# Patient Record
Sex: MALE | Race: BLACK OR AFRICAN AMERICAN | Employment: UNEMPLOYED | URBAN - METROPOLITAN AREA
[De-identification: names, ages, dates, MRNs, and addresses within clinical notes are randomized per-mention and may not be internally consistent; named-entity substitution may affect disease eponyms.]

---

## 2018-07-05 ENCOUNTER — HOSPITAL ENCOUNTER (OUTPATIENT)
Dept: GENERAL RADIOLOGY | Age: 58
Discharge: HOME | End: 2018-07-07
Payer: MEDICARE

## 2018-07-05 DIAGNOSIS — G89.29 CHRONIC PAIN OF RIGHT ANKLE: ICD-10-CM

## 2018-07-05 DIAGNOSIS — M25.571 CHRONIC PAIN OF RIGHT ANKLE: ICD-10-CM

## 2018-07-05 PROCEDURE — 73630 X-RAY EXAM OF FOOT: CPT

## 2018-07-05 PROCEDURE — 73610 X-RAY EXAM OF ANKLE: CPT

## 2022-12-15 ENCOUNTER — APPOINTMENT (OUTPATIENT)
Dept: GENERAL RADIOLOGY | Age: 62
DRG: 420 | End: 2022-12-15
Payer: MEDICARE

## 2022-12-15 ENCOUNTER — HOSPITAL ENCOUNTER (INPATIENT)
Age: 62
LOS: 5 days | Discharge: HOME HEALTH | DRG: 420 | End: 2022-12-20
Attending: EMERGENCY MEDICINE | Admitting: INTERNAL MEDICINE
Payer: MEDICARE

## 2022-12-15 DIAGNOSIS — E11.11 DIABETIC KETOACIDOSIS WITH COMA ASSOCIATED WITH TYPE 2 DIABETES MELLITUS (HCC): Primary | ICD-10-CM

## 2022-12-15 DIAGNOSIS — E10.10 DKA, TYPE 1, NOT AT GOAL (HCC): ICD-10-CM

## 2022-12-15 LAB
ALBUMIN SERPL-MCNC: 3.7 G/DL (ref 3.5–4.6)
ALP BLD-CCNC: 92 U/L (ref 35–104)
ALT SERPL-CCNC: 11 U/L (ref 0–41)
ANION GAP SERPL CALCULATED.3IONS-SCNC: 41 MEQ/L (ref 9–15)
AST SERPL-CCNC: 6 U/L (ref 0–40)
BACTERIA: NEGATIVE /HPF
BANDED NEUTROPHILS RELATIVE PERCENT: 1 %
BASE EXCESS VENOUS: -23 (ref -3–3)
BASOPHILS ABSOLUTE: 0 K/UL (ref 0–0.2)
BASOPHILS RELATIVE PERCENT: 0.7 %
BETA-HYDROXYBUTYRATE: 133.7 MG/DL (ref 0.2–2.8)
BILIRUB SERPL-MCNC: 0.5 MG/DL (ref 0.2–0.7)
BILIRUBIN URINE: NEGATIVE
BLOOD, URINE: ABNORMAL
BUN BLDV-MCNC: 49 MG/DL (ref 8–23)
CALCIUM IONIZED: 0.97 MMOL/L (ref 1.12–1.32)
CALCIUM SERPL-MCNC: 8.8 MG/DL (ref 8.5–9.9)
CHLORIDE BLD-SCNC: 84 MEQ/L (ref 95–107)
CHP ED QC CHECK: NORMAL
CLARITY: CLEAR
CO2: 8 MEQ/L (ref 20–31)
COLOR: YELLOW
CREAT SERPL-MCNC: 2.87 MG/DL (ref 0.7–1.2)
CREATININE URINE: 26.9 MG/DL
EOSINOPHILS ABSOLUTE: 0 K/UL (ref 0–0.7)
EOSINOPHILS RELATIVE PERCENT: 0 %
EPITHELIAL CELLS, UA: ABNORMAL /HPF (ref 0–5)
GFR SERPL CREATININE-BSD FRML MDRD: 24 ML/MIN/{1.73_M2}
GFR SERPL CREATININE-BSD FRML MDRD: 25 ML/MIN/{1.73_M2}
GLOBULIN: 3.5 G/DL (ref 2.3–3.5)
GLUCOSE BLD-MCNC: 1261 MG/DL (ref 70–99)
GLUCOSE BLD-MCNC: >600 MG/DL (ref 70–99)
GLUCOSE BLD-MCNC: >700 MG/DL (ref 70–99)
GLUCOSE URINE: >=1000 MG/DL
HCO3 VENOUS: 7.3 MMOL/L (ref 23–29)
HCT VFR BLD CALC: 48.1 % (ref 42–52)
HEMOGLOBIN: 14.4 G/DL (ref 14–18)
HEMOGLOBIN: 17 GM/DL (ref 13.5–17.5)
HYALINE CASTS: ABNORMAL /HPF (ref 0–5)
INFLUENZA A BY PCR: NEGATIVE
INFLUENZA B BY PCR: NEGATIVE
KETONES, URINE: >=80 MG/DL
LACTATE: 8.96 MMOL/L (ref 0.4–2)
LEUKOCYTE ESTERASE, URINE: NEGATIVE
LYMPHOCYTES ABSOLUTE: 0.5 K/UL (ref 1–4.8)
LYMPHOCYTES RELATIVE PERCENT: 3 %
MAGNESIUM: 3.2 MG/DL (ref 1.7–2.4)
MCH RBC QN AUTO: 25.6 PG (ref 27–31.3)
MCHC RBC AUTO-ENTMCNC: 29.9 % (ref 33–37)
MCV RBC AUTO: 85.5 FL (ref 79–92.2)
MONOCYTES ABSOLUTE: 0.5 K/UL (ref 0.2–0.8)
MONOCYTES RELATIVE PERCENT: 2.9 %
MYELOCYTE PERCENT: 1 %
NEUTROPHILS ABSOLUTE: 15.6 K/UL (ref 1.4–6.5)
NEUTROPHILS RELATIVE PERCENT: 92 %
NITRITE, URINE: NEGATIVE
O2 SAT, VEN: 83 %
PCO2, VEN: 26.2 MM HG (ref 40–50)
PDW BLD-RTO: 13 % (ref 11.5–14.5)
PERFORMED ON: ABNORMAL
PH UA: 5 (ref 5–9)
PH VENOUS: 7.06 (ref 7.32–7.42)
PLATELET # BLD: 393 K/UL (ref 130–400)
PLATELET SLIDE REVIEW: NORMAL
PO2, VEN: 65 MM HG
POC CHLORIDE: 106 MEQ/L (ref 99–110)
POC CREATININE: 2.8 MG/DL (ref 0.8–1.3)
POC HEMATOCRIT: 50 % (ref 41–53)
POC POTASSIUM: 5.2 MEQ/L (ref 3.5–5.1)
POC SAMPLE TYPE: ABNORMAL
POC SODIUM: 128 MEQ/L (ref 136–145)
POTASSIUM SERPL-SCNC: 5.4 MEQ/L (ref 3.4–4.9)
PROTEIN UA: 30 MG/DL
RBC # BLD: 5.62 M/UL (ref 4.7–6.1)
RBC # BLD: NORMAL 10*6/UL
RBC UA: ABNORMAL /HPF (ref 0–5)
REASON FOR REJECTION: NORMAL
REJECTED TEST: NORMAL
SARS-COV-2, NAAT: NOT DETECTED
SODIUM BLD-SCNC: 133 MEQ/L (ref 135–144)
SPECIFIC GRAVITY UA: 1.03 (ref 1–1.03)
TCO2 CALC VENOUS: 8 MMOL/L
TOTAL PROTEIN: 7.2 G/DL (ref 6.3–8)
UROBILINOGEN, URINE: 0.2 E.U./DL
WBC # BLD: 16.6 K/UL (ref 4.8–10.8)
WBC UA: ABNORMAL /HPF (ref 0–5)

## 2022-12-15 PROCEDURE — 82565 ASSAY OF CREATININE: CPT

## 2022-12-15 PROCEDURE — 82570 ASSAY OF URINE CREATININE: CPT

## 2022-12-15 PROCEDURE — 82010 KETONE BODYS QUAN: CPT

## 2022-12-15 PROCEDURE — 84295 ASSAY OF SERUM SODIUM: CPT

## 2022-12-15 PROCEDURE — 82435 ASSAY OF BLOOD CHLORIDE: CPT

## 2022-12-15 PROCEDURE — 96375 TX/PRO/DX INJ NEW DRUG ADDON: CPT

## 2022-12-15 PROCEDURE — 85025 COMPLETE CBC W/AUTO DIFF WBC: CPT

## 2022-12-15 PROCEDURE — 96366 THER/PROPH/DIAG IV INF ADDON: CPT

## 2022-12-15 PROCEDURE — 87086 URINE CULTURE/COLONY COUNT: CPT

## 2022-12-15 PROCEDURE — 82330 ASSAY OF CALCIUM: CPT

## 2022-12-15 PROCEDURE — 2580000003 HC RX 258: Performed by: INTERNAL MEDICINE

## 2022-12-15 PROCEDURE — 6370000000 HC RX 637 (ALT 250 FOR IP): Performed by: EMERGENCY MEDICINE

## 2022-12-15 PROCEDURE — 71045 X-RAY EXAM CHEST 1 VIEW: CPT

## 2022-12-15 PROCEDURE — 84100 ASSAY OF PHOSPHORUS: CPT

## 2022-12-15 PROCEDURE — 2580000003 HC RX 258: Performed by: EMERGENCY MEDICINE

## 2022-12-15 PROCEDURE — 2000000000 HC ICU R&B

## 2022-12-15 PROCEDURE — 87502 INFLUENZA DNA AMP PROBE: CPT

## 2022-12-15 PROCEDURE — 84540 ASSAY OF URINE/UREA-N: CPT

## 2022-12-15 PROCEDURE — 36415 COLL VENOUS BLD VENIPUNCTURE: CPT

## 2022-12-15 PROCEDURE — 82803 BLOOD GASES ANY COMBINATION: CPT

## 2022-12-15 PROCEDURE — 6360000002 HC RX W HCPCS: Performed by: EMERGENCY MEDICINE

## 2022-12-15 PROCEDURE — 93005 ELECTROCARDIOGRAM TRACING: CPT | Performed by: EMERGENCY MEDICINE

## 2022-12-15 PROCEDURE — 96361 HYDRATE IV INFUSION ADD-ON: CPT

## 2022-12-15 PROCEDURE — 82948 REAGENT STRIP/BLOOD GLUCOSE: CPT

## 2022-12-15 PROCEDURE — 80053 COMPREHEN METABOLIC PANEL: CPT

## 2022-12-15 PROCEDURE — 83605 ASSAY OF LACTIC ACID: CPT

## 2022-12-15 PROCEDURE — 99285 EMERGENCY DEPT VISIT HI MDM: CPT

## 2022-12-15 PROCEDURE — 96365 THER/PROPH/DIAG IV INF INIT: CPT

## 2022-12-15 PROCEDURE — 6370000000 HC RX 637 (ALT 250 FOR IP): Performed by: INTERNAL MEDICINE

## 2022-12-15 PROCEDURE — 85014 HEMATOCRIT: CPT

## 2022-12-15 PROCEDURE — 84132 ASSAY OF SERUM POTASSIUM: CPT

## 2022-12-15 PROCEDURE — 81001 URINALYSIS AUTO W/SCOPE: CPT

## 2022-12-15 PROCEDURE — 36600 WITHDRAWAL OF ARTERIAL BLOOD: CPT

## 2022-12-15 PROCEDURE — 6360000002 HC RX W HCPCS: Performed by: INTERNAL MEDICINE

## 2022-12-15 PROCEDURE — 83735 ASSAY OF MAGNESIUM: CPT

## 2022-12-15 RX ORDER — MAGNESIUM SULFATE 1 G/100ML
1000 INJECTION INTRAVENOUS PRN
Status: DISCONTINUED | OUTPATIENT
Start: 2022-12-15 | End: 2022-12-20 | Stop reason: HOSPADM

## 2022-12-15 RX ORDER — ACETAMINOPHEN 650 MG/1
650 SUPPOSITORY RECTAL EVERY 6 HOURS PRN
Status: DISCONTINUED | OUTPATIENT
Start: 2022-12-15 | End: 2022-12-20 | Stop reason: HOSPADM

## 2022-12-15 RX ORDER — POTASSIUM CHLORIDE 7.45 MG/ML
10 INJECTION INTRAVENOUS PRN
Status: DISCONTINUED | OUTPATIENT
Start: 2022-12-15 | End: 2022-12-20 | Stop reason: HOSPADM

## 2022-12-15 RX ORDER — 0.9 % SODIUM CHLORIDE 0.9 %
2000 INTRAVENOUS SOLUTION INTRAVENOUS ONCE
Status: COMPLETED | OUTPATIENT
Start: 2022-12-15 | End: 2022-12-15

## 2022-12-15 RX ORDER — ONDANSETRON 4 MG/1
4 TABLET, ORALLY DISINTEGRATING ORAL EVERY 8 HOURS PRN
Status: DISCONTINUED | OUTPATIENT
Start: 2022-12-15 | End: 2022-12-20 | Stop reason: HOSPADM

## 2022-12-15 RX ORDER — SODIUM CHLORIDE 450 MG/100ML
INJECTION, SOLUTION INTRAVENOUS CONTINUOUS
Status: DISCONTINUED | OUTPATIENT
Start: 2022-12-15 | End: 2022-12-17

## 2022-12-15 RX ORDER — ENOXAPARIN SODIUM 100 MG/ML
40 INJECTION SUBCUTANEOUS DAILY
Status: DISCONTINUED | OUTPATIENT
Start: 2022-12-16 | End: 2022-12-15

## 2022-12-15 RX ORDER — ONDANSETRON 2 MG/ML
4 INJECTION INTRAMUSCULAR; INTRAVENOUS ONCE
Status: COMPLETED | OUTPATIENT
Start: 2022-12-15 | End: 2022-12-15

## 2022-12-15 RX ORDER — ACETAMINOPHEN 325 MG/1
650 TABLET ORAL EVERY 6 HOURS PRN
Status: DISCONTINUED | OUTPATIENT
Start: 2022-12-15 | End: 2022-12-20 | Stop reason: HOSPADM

## 2022-12-15 RX ORDER — ENOXAPARIN SODIUM 100 MG/ML
40 INJECTION SUBCUTANEOUS DAILY
Status: DISCONTINUED | OUTPATIENT
Start: 2022-12-16 | End: 2022-12-20 | Stop reason: HOSPADM

## 2022-12-15 RX ORDER — DEXTROSE AND SODIUM CHLORIDE 5; .45 G/100ML; G/100ML
INJECTION, SOLUTION INTRAVENOUS CONTINUOUS PRN
Status: DISCONTINUED | OUTPATIENT
Start: 2022-12-15 | End: 2022-12-17

## 2022-12-15 RX ORDER — ONDANSETRON 2 MG/ML
4 INJECTION INTRAMUSCULAR; INTRAVENOUS EVERY 6 HOURS PRN
Status: DISCONTINUED | OUTPATIENT
Start: 2022-12-15 | End: 2022-12-20 | Stop reason: HOSPADM

## 2022-12-15 RX ADMIN — SODIUM CHLORIDE 0.1 UNITS/KG/HR: 9 INJECTION, SOLUTION INTRAVENOUS at 23:56

## 2022-12-15 RX ADMIN — ONDANSETRON 4 MG: 2 INJECTION INTRAMUSCULAR; INTRAVENOUS at 23:30

## 2022-12-15 RX ADMIN — SODIUM CHLORIDE 8 UNITS/HR: 9 INJECTION, SOLUTION INTRAVENOUS at 18:55

## 2022-12-15 RX ADMIN — SODIUM CHLORIDE 2000 ML: 9 INJECTION, SOLUTION INTRAVENOUS at 17:48

## 2022-12-15 RX ADMIN — SODIUM CHLORIDE 2000 ML: 9 INJECTION, SOLUTION INTRAVENOUS at 21:02

## 2022-12-15 RX ADMIN — INSULIN HUMAN 10 UNITS: 100 INJECTION, SOLUTION PARENTERAL at 20:52

## 2022-12-15 RX ADMIN — ONDANSETRON 4 MG: 2 INJECTION INTRAMUSCULAR; INTRAVENOUS at 18:34

## 2022-12-15 ASSESSMENT — ENCOUNTER SYMPTOMS
VOMITING: 1
NAUSEA: 1

## 2022-12-15 ASSESSMENT — PAIN - FUNCTIONAL ASSESSMENT: PAIN_FUNCTIONAL_ASSESSMENT: NONE - DENIES PAIN

## 2022-12-15 NOTE — ED PROVIDER NOTES
[address] [facility] ED  EMERGENCY DEPARTMENT ENCOUNTER      Pt Name: Bette Vallejo  MRN: [id number]  [place] 1960  Date of evaluation: 12/15/2022  Provider: Liza Zimmerman MD    CHIEF COMPLAINT       Chief Complaint   Patient presents with    Illness     Unwell x3days    Emesis    Fatigue     HISTORY OF PRESENT ILLNESS   (Location/Symptom, Timing/Onset, Context/Setting, Quality, Duration, Modifying Factors, Severity)  Note limiting factors. 80-year-old male presenting with general illness. Has felt unwell for about 3 days. Notes multiple episodes of nausea and vomiting. Has not taken anything for relief. No specific pain. On arrival squad finds his blood sugar to be high. No history of diabetes. Alert and oriented but slow to respond. Nursing Notes were reviewed. REVIEW OF SYSTEMS    (2-9 systems for level 4, 10 or more for level 5)     Review of Systems   Constitutional:  Positive for fatigue. Gastrointestinal:  Positive for nausea and vomiting. Neurological:  Positive for weakness. Except as noted above the remainder of the review of systems was reviewed and negative. PAST MEDICAL HISTORY   No past medical history on file. SURGICAL HISTORY     No past surgical history on file. CURRENT MEDICATIONS       Previous Medications    No medications on file       ALLERGIES     Patient has no known allergies. FAMILY HISTORY     No family history on file. SOCIAL HISTORY       Social History     Socioeconomic History    Marital status: Single       SCREENINGS               PHYSICAL EXAM    (up to 7 for level 4, 8 or more for level 5)     ED Triage Vitals [12/15/22 1656]   BP Temp Temp Source Heart Rate Resp SpO2 Height Weight   116/85 -- Oral (!) 109 21 99 % -- --       Physical Exam  Vitals and nursing note reviewed. Constitutional:       General: He is not in acute distress. Appearance: Normal appearance. He is well-developed. He is not ill-appearing. HENT:      Head: Normocephalic and atraumatic. Mouth/Throat:      Mouth: Mucous membranes are dry. Pharynx: Oropharynx is clear. Eyes:      Extraocular Movements: Extraocular movements intact. Conjunctiva/sclera: Conjunctivae normal.   Cardiovascular:      Rate and Rhythm: Regular rhythm. Tachycardia present. Pulmonary:      Effort: Pulmonary effort is normal.      Breath sounds: Normal breath sounds. Abdominal:      General: Bowel sounds are normal.      Palpations: Abdomen is soft. Tenderness: There is no abdominal tenderness. Musculoskeletal:         General: No deformity. Normal range of motion. Cervical back: Normal range of motion and neck supple. Skin:     General: Skin is warm and dry. Capillary Refill: Capillary refill takes less than 2 seconds. Neurological:      General: No focal deficit present. Mental Status: He is alert and oriented to person, place, and time. Mental status is at baseline. Cranial Nerves: No cranial nerve deficit. Psychiatric:         Thought Content:  Thought content normal.       DIAGNOSTIC RESULTS     EKG: All EKG's are interpreted by the Emergency Department Physician who either signs or Co-signs this chart in the absence of a cardiologist.    Sinus tachy, rate 107, normal intervals, no ST elevation/ depression    RADIOLOGY:   Non-plain film images such as CT, Ultrasound and MRI are read by the radiologist. Plain radiographic images are visualized and preliminarily interpreted by the emergency physician with the below findings:    Interpretation per the Radiologist below, if available at the time of this note:    No orders to display       LABS:  Labs Reviewed   CBC WITH AUTO DIFFERENTIAL - Abnormal; Notable for the following components:       Result Value    WBC 16.6 (*)     MCH 25.6 (*)     MCHC 29.9 (*)     All other components within normal limits   BETA-HYDROXYBUTYRATE - Abnormal; Notable for the following components: Beta-Hydroxybutyrate 133.7 (*)     All other components within normal limits   POCT GLUCOSE - Abnormal; Notable for the following components:    POC Glucose >600 (*)     All other components within normal limits   POCT VENOUS - Abnormal; Notable for the following components:    POC Sodium 128 (*)     POC Potassium 5.2 (*)     POC Glucose >700 (*)     POC Creatinine 2.8 (*)     Est, Glom Filt Rate 25 (*)     Calcium, Ionized 0.97 (*)     pH, Tex 7.056 (*)     pCO2, Tex 26.2 (*)     HCO3, Venous 7.3 (*)     Base Excess, Tex -23 (*)     Lactate 8.96 (*)     All other components within normal limits   POCT GLUCOSE - Abnormal; Notable for the following components:    POC Glucose >600 (*)     All other components within normal limits   POCT GLUCOSE - Normal   COVID-19, RAPID   RAPID INFLUENZA A/B ANTIGENS   SPECIMEN REJECTION   COMPREHENSIVE METABOLIC PANEL   MAGNESIUM   POCT EPOC BLOOD GAS, LACTIC ACID, ICA       All other labs were within normal range or not returned as of this dictation. EMERGENCY DEPARTMENT COURSE and DIFFERENTIAL DIAGNOSIS/MDM:   Vitals:    Vitals:    12/15/22 1656 12/15/22 1747   BP: 116/85    Pulse: (!) 109    Resp: 21    Temp:  (!) 92.5 °F (33.6 °C)   TempSrc: Oral Rectal   SpO2: 99%        MDM  Is a fluid rehydration along with being placed on insulin drip. He required ICU. He is already more awake and alert. Hypothermia was a complication and he is put on a Gerri hugger along with warm fluids    Procedures    CRITICAL CARE TIME   Total Critical Care time was 42 minutes, excluding separately reportable procedures. There was a high probability of clinically significant/life threatening deterioration in the patient's condition which required my urgent intervention. FINAL IMPRESSION      1.  Diabetic ketoacidosis with coma associated with type 2 diabetes mellitus [facility])          DISPOSITION/PLAN   DISPOSITION Decision To Admit 12/15/2022 06:21:00 PM      (Please note that portions of this note were completed with a voice recognition program.  Efforts were made to edit the dictations but occasionally words are mis-transcribed.)    Bryce Garrett MD (electronically signed)  Attending Emergency Physician       Bryce Garrett MD  12/15/22 2028

## 2022-12-15 NOTE — ED NOTES
Pt arrived to ED via Ems with c/o of AMS. Pt states he has been ill for a few days. Pt poor historian. Pt is a&ox4. Pt is lethargic. Pt denies chest pain, abd pain. Pt is vomiting during assessment. Pt is cool to touch. Unable to obtain oral temp. Rectal temp 92.5. Pt placed on cardiac monitor. Pt given warm blankets and bear hugger. Attending notified.       Amarilis Gastelum RN  12/15/22 1938

## 2022-12-16 ENCOUNTER — APPOINTMENT (OUTPATIENT)
Dept: INTERVENTIONAL RADIOLOGY/VASCULAR | Age: 62
DRG: 420 | End: 2022-12-16
Payer: MEDICARE

## 2022-12-16 ENCOUNTER — APPOINTMENT (OUTPATIENT)
Dept: ULTRASOUND IMAGING | Age: 62
DRG: 420 | End: 2022-12-16
Payer: MEDICARE

## 2022-12-16 PROBLEM — E11.11 DIABETIC KETOACIDOSIS WITH COMA ASSOCIATED WITH TYPE 2 DIABETES MELLITUS (HCC): Status: ACTIVE | Noted: 2022-12-16

## 2022-12-16 PROBLEM — K92.0 COFFEE GROUND EMESIS: Status: ACTIVE | Noted: 2022-12-16

## 2022-12-16 LAB
ANION GAP SERPL CALCULATED.3IONS-SCNC: 11 MEQ/L (ref 9–15)
ANION GAP SERPL CALCULATED.3IONS-SCNC: 19 MEQ/L (ref 9–15)
ANION GAP SERPL CALCULATED.3IONS-SCNC: 20 MEQ/L (ref 9–15)
ANION GAP SERPL CALCULATED.3IONS-SCNC: 25 MEQ/L (ref 9–15)
ANION GAP SERPL CALCULATED.3IONS-SCNC: 30 MEQ/L (ref 9–15)
ANION GAP SERPL CALCULATED.3IONS-SCNC: 31 MEQ/L (ref 9–15)
BASOPHILS ABSOLUTE: 0 K/UL (ref 0–0.2)
BASOPHILS RELATIVE PERCENT: 0.2 %
BUN BLDV-MCNC: 24 MG/DL (ref 8–23)
BUN BLDV-MCNC: 31 MG/DL (ref 8–23)
BUN BLDV-MCNC: 34 MG/DL (ref 8–23)
BUN BLDV-MCNC: 42 MG/DL (ref 8–23)
BUN BLDV-MCNC: 46 MG/DL (ref 8–23)
BUN BLDV-MCNC: 47 MG/DL (ref 8–23)
CALCIUM SERPL-MCNC: 8.1 MG/DL (ref 8.5–9.9)
CALCIUM SERPL-MCNC: 8.2 MG/DL (ref 8.5–9.9)
CALCIUM SERPL-MCNC: 8.4 MG/DL (ref 8.5–9.9)
CALCIUM SERPL-MCNC: 8.6 MG/DL (ref 8.5–9.9)
CALCIUM SERPL-MCNC: 8.7 MG/DL (ref 8.5–9.9)
CALCIUM SERPL-MCNC: 8.9 MG/DL (ref 8.5–9.9)
CHLORIDE BLD-SCNC: 102 MEQ/L (ref 95–107)
CHLORIDE BLD-SCNC: 109 MEQ/L (ref 95–107)
CHLORIDE BLD-SCNC: 111 MEQ/L (ref 95–107)
CHLORIDE BLD-SCNC: 111 MEQ/L (ref 95–107)
CHLORIDE BLD-SCNC: 114 MEQ/L (ref 95–107)
CHLORIDE BLD-SCNC: 98 MEQ/L (ref 95–107)
CO2: 15 MEQ/L (ref 20–31)
CO2: 17 MEQ/L (ref 20–31)
CO2: 19 MEQ/L (ref 20–31)
CO2: 22 MEQ/L (ref 20–31)
CO2: 9 MEQ/L (ref 20–31)
CO2: 9 MEQ/L (ref 20–31)
CREAT SERPL-MCNC: 1.33 MG/DL (ref 0.7–1.2)
CREAT SERPL-MCNC: 1.64 MG/DL (ref 0.7–1.2)
CREAT SERPL-MCNC: 1.66 MG/DL (ref 0.7–1.2)
CREAT SERPL-MCNC: 2.25 MG/DL (ref 0.7–1.2)
CREAT SERPL-MCNC: 2.43 MG/DL (ref 0.7–1.2)
CREAT SERPL-MCNC: 2.56 MG/DL (ref 0.7–1.2)
EKG ATRIAL RATE: 107 BPM
EKG P AXIS: 87 DEGREES
EKG P-R INTERVAL: 152 MS
EKG Q-T INTERVAL: 368 MS
EKG QRS DURATION: 92 MS
EKG QTC CALCULATION (BAZETT): 491 MS
EKG R AXIS: 80 DEGREES
EKG T AXIS: 77 DEGREES
EKG VENTRICULAR RATE: 107 BPM
EOSINOPHILS ABSOLUTE: 0 K/UL (ref 0–0.7)
EOSINOPHILS RELATIVE PERCENT: 0 %
GFR SERPL CREATININE-BSD FRML MDRD: 27.5 ML/MIN/{1.73_M2}
GFR SERPL CREATININE-BSD FRML MDRD: 29.3 ML/MIN/{1.73_M2}
GFR SERPL CREATININE-BSD FRML MDRD: 32.1 ML/MIN/{1.73_M2}
GFR SERPL CREATININE-BSD FRML MDRD: 46.3 ML/MIN/{1.73_M2}
GFR SERPL CREATININE-BSD FRML MDRD: 47 ML/MIN/{1.73_M2}
GFR SERPL CREATININE-BSD FRML MDRD: >60 ML/MIN/{1.73_M2}
GLUCOSE BLD-MCNC: 160 MG/DL (ref 70–99)
GLUCOSE BLD-MCNC: 237 MG/DL (ref 70–99)
GLUCOSE BLD-MCNC: 242 MG/DL (ref 70–99)
GLUCOSE BLD-MCNC: 244 MG/DL (ref 70–99)
GLUCOSE BLD-MCNC: 258 MG/DL (ref 70–99)
GLUCOSE BLD-MCNC: 261 MG/DL (ref 70–99)
GLUCOSE BLD-MCNC: 273 MG/DL (ref 70–99)
GLUCOSE BLD-MCNC: 281 MG/DL (ref 70–99)
GLUCOSE BLD-MCNC: 301 MG/DL (ref 70–99)
GLUCOSE BLD-MCNC: 306 MG/DL (ref 70–99)
GLUCOSE BLD-MCNC: 327 MG/DL (ref 70–99)
GLUCOSE BLD-MCNC: 390 MG/DL (ref 70–99)
GLUCOSE BLD-MCNC: 440 MG/DL (ref 70–99)
GLUCOSE BLD-MCNC: 452 MG/DL (ref 70–99)
GLUCOSE BLD-MCNC: 460 MG/DL (ref 70–99)
GLUCOSE BLD-MCNC: 557 MG/DL (ref 70–99)
GLUCOSE BLD-MCNC: 576 MG/DL (ref 70–99)
GLUCOSE BLD-MCNC: 667 MG/DL (ref 70–99)
GLUCOSE BLD-MCNC: 773 MG/DL (ref 70–99)
GLUCOSE BLD-MCNC: 877 MG/DL (ref 70–99)
HCT VFR BLD CALC: 38.1 % (ref 42–52)
HCT VFR BLD CALC: 46.2 % (ref 42–52)
HCT VFR BLD CALC: 47.6 % (ref 42–52)
HEMOGLOBIN: 12.7 G/DL (ref 14–18)
HEMOGLOBIN: 15.2 G/DL (ref 14–18)
HEMOGLOBIN: 15.4 G/DL (ref 14–18)
LYMPHOCYTES ABSOLUTE: 0.8 K/UL (ref 1–4.8)
LYMPHOCYTES RELATIVE PERCENT: 5.8 %
MAGNESIUM: 2.7 MG/DL (ref 1.7–2.4)
MAGNESIUM: 2.7 MG/DL (ref 1.7–2.4)
MAGNESIUM: 2.9 MG/DL (ref 1.7–2.4)
MAGNESIUM: 3.1 MG/DL (ref 1.7–2.4)
MAGNESIUM: 3.1 MG/DL (ref 1.7–2.4)
MCH RBC QN AUTO: 25.6 PG (ref 27–31.3)
MCHC RBC AUTO-ENTMCNC: 33 % (ref 33–37)
MCV RBC AUTO: 77.6 FL (ref 79–92.2)
MONOCYTES ABSOLUTE: 1.3 K/UL (ref 0.2–0.8)
MONOCYTES RELATIVE PERCENT: 9 %
NEUTROPHILS ABSOLUTE: 12.2 K/UL (ref 1.4–6.5)
NEUTROPHILS RELATIVE PERCENT: 85 %
PDW BLD-RTO: 12.1 % (ref 11.5–14.5)
PERFORMED ON: ABNORMAL
PHOSPHORUS: 2.9 MG/DL (ref 2.3–4.8)
PHOSPHORUS: 3.2 MG/DL (ref 2.3–4.8)
PHOSPHORUS: 3.4 MG/DL (ref 2.3–4.8)
PHOSPHORUS: 3.7 MG/DL (ref 2.3–4.8)
PHOSPHORUS: 4.5 MG/DL (ref 2.3–4.8)
PLATELET # BLD: 370 K/UL (ref 130–400)
POTASSIUM SERPL-SCNC: 4.3 MEQ/L (ref 3.4–4.9)
POTASSIUM SERPL-SCNC: 4.6 MEQ/L (ref 3.4–4.9)
POTASSIUM SERPL-SCNC: 4.6 MEQ/L (ref 3.4–4.9)
POTASSIUM SERPL-SCNC: 4.8 MEQ/L (ref 3.4–4.9)
POTASSIUM SERPL-SCNC: 4.9 MEQ/L (ref 3.4–4.9)
POTASSIUM SERPL-SCNC: 5.3 MEQ/L (ref 3.4–4.9)
PROCALCITONIN: 0.92 NG/ML (ref 0–0.15)
RBC # BLD: 5.95 M/UL (ref 4.7–6.1)
REASON FOR REJECTION: NORMAL
REJECTED TEST: NORMAL
SODIUM BLD-SCNC: 138 MEQ/L (ref 135–144)
SODIUM BLD-SCNC: 141 MEQ/L (ref 135–144)
SODIUM BLD-SCNC: 147 MEQ/L (ref 135–144)
SODIUM BLD-SCNC: 147 MEQ/L (ref 135–144)
SODIUM BLD-SCNC: 149 MEQ/L (ref 135–144)
SODIUM BLD-SCNC: 150 MEQ/L (ref 135–144)
UREA NITROGEN, UR: 261 MG/DL
WBC # BLD: 14.4 K/UL (ref 4.8–10.8)

## 2022-12-16 PROCEDURE — 2500000003 HC RX 250 WO HCPCS: Performed by: NURSE PRACTITIONER

## 2022-12-16 PROCEDURE — 6360000002 HC RX W HCPCS: Performed by: NURSE PRACTITIONER

## 2022-12-16 PROCEDURE — A4216 STERILE WATER/SALINE, 10 ML: HCPCS | Performed by: INTERNAL MEDICINE

## 2022-12-16 PROCEDURE — 83735 ASSAY OF MAGNESIUM: CPT

## 2022-12-16 PROCEDURE — 2580000003 HC RX 258: Performed by: NURSE PRACTITIONER

## 2022-12-16 PROCEDURE — 6360000002 HC RX W HCPCS: Performed by: INTERNAL MEDICINE

## 2022-12-16 PROCEDURE — G0108 DIAB MANAGE TRN  PER INDIV: HCPCS

## 2022-12-16 PROCEDURE — 2580000003 HC RX 258: Performed by: INTERNAL MEDICINE

## 2022-12-16 PROCEDURE — 76775 US EXAM ABDO BACK WALL LIM: CPT

## 2022-12-16 PROCEDURE — 85014 HEMATOCRIT: CPT

## 2022-12-16 PROCEDURE — C9113 INJ PANTOPRAZOLE SODIUM, VIA: HCPCS | Performed by: INTERNAL MEDICINE

## 2022-12-16 PROCEDURE — 02HV33Z INSERTION OF INFUSION DEVICE INTO SUPERIOR VENA CAVA, PERCUTANEOUS APPROACH: ICD-10-PCS | Performed by: RADIOLOGY

## 2022-12-16 PROCEDURE — 85025 COMPLETE CBC W/AUTO DIFF WBC: CPT

## 2022-12-16 PROCEDURE — 82947 ASSAY GLUCOSE BLOOD QUANT: CPT

## 2022-12-16 PROCEDURE — 84100 ASSAY OF PHOSPHORUS: CPT

## 2022-12-16 PROCEDURE — 84145 PROCALCITONIN (PCT): CPT

## 2022-12-16 PROCEDURE — 85018 HEMOGLOBIN: CPT

## 2022-12-16 PROCEDURE — 84681 ASSAY OF C-PEPTIDE: CPT

## 2022-12-16 PROCEDURE — 2709999900 IR PICC WO SQ PORT/PUMP > 5 YEARS

## 2022-12-16 PROCEDURE — 99221 1ST HOSP IP/OBS SF/LOW 40: CPT | Performed by: INTERNAL MEDICINE

## 2022-12-16 PROCEDURE — 99222 1ST HOSP IP/OBS MODERATE 55: CPT | Performed by: PHYSICIAN ASSISTANT

## 2022-12-16 PROCEDURE — 2000000000 HC ICU R&B

## 2022-12-16 PROCEDURE — 36415 COLL VENOUS BLD VENIPUNCTURE: CPT

## 2022-12-16 PROCEDURE — 87040 BLOOD CULTURE FOR BACTERIA: CPT

## 2022-12-16 PROCEDURE — 86341 ISLET CELL ANTIBODY: CPT

## 2022-12-16 PROCEDURE — 80048 BASIC METABOLIC PNL TOTAL CA: CPT

## 2022-12-16 PROCEDURE — 36573 INSJ PICC RS&I 5 YR+: CPT | Performed by: RADIOLOGY

## 2022-12-16 PROCEDURE — 36573 INSJ PICC RS&I 5 YR+: CPT

## 2022-12-16 PROCEDURE — 83036 HEMOGLOBIN GLYCOSYLATED A1C: CPT

## 2022-12-16 PROCEDURE — 6370000000 HC RX 637 (ALT 250 FOR IP): Performed by: INTERNAL MEDICINE

## 2022-12-16 PROCEDURE — 93010 ELECTROCARDIOGRAM REPORT: CPT | Performed by: INTERNAL MEDICINE

## 2022-12-16 RX ORDER — SODIUM CHLORIDE 0.9 % (FLUSH) 0.9 %
5-40 SYRINGE (ML) INJECTION PRN
Status: DISCONTINUED | OUTPATIENT
Start: 2022-12-16 | End: 2022-12-20 | Stop reason: HOSPADM

## 2022-12-16 RX ORDER — SODIUM CHLORIDE 0.9 % (FLUSH) 0.9 %
5-40 SYRINGE (ML) INJECTION EVERY 12 HOURS SCHEDULED
Status: DISCONTINUED | OUTPATIENT
Start: 2022-12-16 | End: 2022-12-20 | Stop reason: HOSPADM

## 2022-12-16 RX ORDER — LIDOCAINE HYDROCHLORIDE 20 MG/ML
5 INJECTION, SOLUTION INFILTRATION; PERINEURAL ONCE
Status: COMPLETED | OUTPATIENT
Start: 2022-12-16 | End: 2022-12-16

## 2022-12-16 RX ORDER — SODIUM CHLORIDE 9 MG/ML
250 INJECTION, SOLUTION INTRAVENOUS ONCE
Status: COMPLETED | OUTPATIENT
Start: 2022-12-16 | End: 2022-12-16

## 2022-12-16 RX ORDER — CLONAZEPAM 1 MG/1
1 TABLET ORAL EVERY 12 HOURS
Status: DISCONTINUED | OUTPATIENT
Start: 2022-12-16 | End: 2022-12-16

## 2022-12-16 RX ORDER — SODIUM CHLORIDE 9 MG/ML
INJECTION, SOLUTION INTRAVENOUS PRN
Status: DISCONTINUED | OUTPATIENT
Start: 2022-12-16 | End: 2022-12-20 | Stop reason: HOSPADM

## 2022-12-16 RX ADMIN — SODIUM CHLORIDE, PRESERVATIVE FREE 40 MG: 5 INJECTION INTRAVENOUS at 20:35

## 2022-12-16 RX ADMIN — SODIUM CHLORIDE 0.03 UNITS/KG/HR: 9 INJECTION, SOLUTION INTRAVENOUS at 03:43

## 2022-12-16 RX ADMIN — ONDANSETRON 4 MG: 2 INJECTION INTRAMUSCULAR; INTRAVENOUS at 08:11

## 2022-12-16 RX ADMIN — POTASSIUM CHLORIDE 10 MEQ: 7.46 INJECTION, SOLUTION INTRAVENOUS at 20:53

## 2022-12-16 RX ADMIN — DEXTROSE AND SODIUM CHLORIDE: 5; 450 INJECTION, SOLUTION INTRAVENOUS at 16:06

## 2022-12-16 RX ADMIN — POTASSIUM CHLORIDE 10 MEQ: 7.46 INJECTION, SOLUTION INTRAVENOUS at 11:56

## 2022-12-16 RX ADMIN — SODIUM CHLORIDE: 4.5 INJECTION, SOLUTION INTRAVENOUS at 08:55

## 2022-12-16 RX ADMIN — DEXTROSE AND SODIUM CHLORIDE: 5; 450 INJECTION, SOLUTION INTRAVENOUS at 22:16

## 2022-12-16 RX ADMIN — LIDOCAINE HYDROCHLORIDE 5 ML: 20 INJECTION, SOLUTION INFILTRATION; PERINEURAL at 16:23

## 2022-12-16 RX ADMIN — SODIUM CHLORIDE: 4.5 INJECTION, SOLUTION INTRAVENOUS at 00:06

## 2022-12-16 RX ADMIN — POTASSIUM CHLORIDE 10 MEQ: 7.46 INJECTION, SOLUTION INTRAVENOUS at 13:00

## 2022-12-16 RX ADMIN — SODIUM CHLORIDE: 4.5 INJECTION, SOLUTION INTRAVENOUS at 04:32

## 2022-12-16 RX ADMIN — SODIUM CHLORIDE 0.25 UNITS/KG/HR: 9 INJECTION, SOLUTION INTRAVENOUS at 22:21

## 2022-12-16 RX ADMIN — POTASSIUM CHLORIDE 10 MEQ: 7.46 INJECTION, SOLUTION INTRAVENOUS at 16:14

## 2022-12-16 RX ADMIN — ENOXAPARIN SODIUM 40 MG: 100 INJECTION SUBCUTANEOUS at 20:35

## 2022-12-16 RX ADMIN — POTASSIUM CHLORIDE 10 MEQ: 7.46 INJECTION, SOLUTION INTRAVENOUS at 17:43

## 2022-12-16 RX ADMIN — Medication 10 ML: at 20:36

## 2022-12-16 RX ADMIN — Medication 40 MG: at 08:11

## 2022-12-16 RX ADMIN — SODIUM CHLORIDE: 4.5 INJECTION, SOLUTION INTRAVENOUS at 13:24

## 2022-12-16 RX ADMIN — POTASSIUM CHLORIDE 10 MEQ: 7.46 INJECTION, SOLUTION INTRAVENOUS at 22:13

## 2022-12-16 RX ADMIN — SODIUM CHLORIDE 250 ML: 9 INJECTION, SOLUTION INTRAVENOUS at 16:22

## 2022-12-16 RX ADMIN — ONDANSETRON 4 MG: 2 INJECTION INTRAMUSCULAR; INTRAVENOUS at 17:50

## 2022-12-16 RX ADMIN — SODIUM CHLORIDE 0.17 UNITS/KG/HR: 9 INJECTION, SOLUTION INTRAVENOUS at 20:50

## 2022-12-16 RX ADMIN — SODIUM CHLORIDE 0.07 UNITS/KG/HR: 9 INJECTION, SOLUTION INTRAVENOUS at 14:42

## 2022-12-16 RX ADMIN — SODIUM CHLORIDE 0.01 UNITS/KG/HR: 9 INJECTION, SOLUTION INTRAVENOUS at 06:35

## 2022-12-16 RX ADMIN — CEFTRIAXONE SODIUM 1000 MG: 1 INJECTION, POWDER, FOR SOLUTION INTRAMUSCULAR; INTRAVENOUS at 12:27

## 2022-12-16 ASSESSMENT — ENCOUNTER SYMPTOMS
RHINORRHEA: 0
SORE THROAT: 0
COUGH: 0
VOMITING: 1
SINUS PRESSURE: 0
SHORTNESS OF BREATH: 0
NAUSEA: 1
ABDOMINAL PAIN: 1
DIARRHEA: 0
WHEEZING: 0

## 2022-12-16 ASSESSMENT — PAIN SCALES - GENERAL
PAINLEVEL_OUTOF10: 0

## 2022-12-16 NOTE — PROGRESS NOTES
Around 2210: Patient arrived to ICU from ER Dr. Fernanda Munroe at bedside. 2230: Full assessment complete at this time. Patient is tachycardic. Patient answers orientation questions correctly, but is very lethargic and confused at times. Skin assessment completed with Jb Alcazar. Skin is intact. 2315: Phlebotomist at bedside for glucose blood draw  0000: Patient had one moderate sized dark brown, coffee ground emesis. Dr. Fernanda Munroe notified- see new orders. 0050: Phlebotomist at bedside for glucose blood draw. Blood draw attempt unsuccessful  0115: Phlebotomist at bedside for glucose blood draw. 0230: Phlebotomist at bedside for glucose blood draw  0445: Phlebotomist at bedside for lab draws. Phlebotomists notified multiple times throughout the night of order for [facility] glucose draws due to high readings. Insulin drip titrated as able following blood glucose results. Patient had two coffee ground emesis throughout the night.    Handoff report given to Harriet HOOD at [facility]  Electronically signed by Paul Manuel RN on 12/16/2022 at 7:20 AM

## 2022-12-16 NOTE — PROGRESS NOTES
Hospitalist Daily Progress Note  Name: Davon Ascencio  Age: 58 y.o. Gender: male  CodeStatus: Full Code  Allergies: No Known Allergies    Chief Complaint:Illness (Tarik Merchant), Emesis, and Fatigue      Primary Care Provider: Yousif Sesay MD    InpatientTreatment Team: Treatment Team: Attending Provider: Curtis Fontenot MD; Consulting Physician: Ludy Mckeon PA-C; Consulting Physician: Phyllistine Romberg, MD; Consulting Physician: Jayden Vera MD; : Dar Concepcion; Registered Nurse: My Lance RN; Utilization Reviewer: Kiersten Jacobo RN    Admission Date: 12/15/2022      Subjective: No chest pain, sob, nausea. Resting in bed. Physical Exam  Vitals and nursing note reviewed. Constitutional:       Appearance: Normal appearance. Cardiovascular:      Rate and Rhythm: Normal rate and regular rhythm. Pulmonary:      Effort: Pulmonary effort is normal.      Breath sounds: Normal breath sounds. Abdominal:      General: Bowel sounds are normal.      Palpations: Abdomen is soft. Musculoskeletal:         General: Normal range of motion. Skin:     General: Skin is warm and dry. Neurological:      Mental Status: He is alert and oriented to person, place, and time. Mental status is at baseline.        Medications:  Reviewed    Infusion Medications:    sodium chloride 250 mL/hr at 12/16/22 1009    dextrose 5 % and 0.45 % NaCl      insulin 0.0322 Units/kg/hr (12/16/22 1159)     Scheduled Medications:    pantoprazole (PROTONIX) 40 mg injection  40 mg IntraVENous Daily    cefTRIAXone (ROCEPHIN) IV  1,000 mg IntraVENous Q24H    enoxaparin  40 mg SubCUTAneous Daily     PRN Meds: dextrose bolus **OR** dextrose bolus, potassium chloride, acetaminophen **OR** acetaminophen, dextrose 5 % and 0.45 % NaCl, ondansetron **OR** ondansetron, magnesium sulfate, sodium phosphate IVPB **OR** sodium phosphate IVPB **OR** sodium phosphate IVPB    Labs:   Recent Labs     12/15/22  1715 12/15/22  8734 12/16/22 0452   WBC 16.6*  --  14.4*   HGB 14.4 17.0 15.2   HCT 48.1  --  46.2     --  370     Recent Labs     12/16/22  0113 12/16/22 0452 12/16/22  1025    149* 150*   K 5.3* 4.6 4.8    109* 111*   CO2 9* 15* 19*   BUN 46* 42* 34*   CREATININE 2.43* 2.25* 1.64*   CALCIUM 8.7 8.9 8.4*   PHOS 3.7 2.9 3.4     Recent Labs     12/15/22  1952   AST 6   ALT 11   BILITOT 0.5   ALKPHOS 92     No results for input(s): INR in the last 72 hours. No results for input(s): Jose Alfredo Pander in the last 72 hours. Urinalysis:   Lab Results   Component Value Date/Time    NITRU Negative 12/15/2022 10:33 PM    WBCUA 6-9 12/15/2022 10:33 PM    BACTERIA Negative 12/15/2022 10:33 PM    RBCUA 3-5 12/15/2022 10:33 PM    BLOODU SMALL 12/15/2022 10:33 PM    SPECGRAV 1.026 12/15/2022 10:33 PM    GLUCOSEU >=1000 12/15/2022 10:33 PM       Radiology:   Most recent    Chest CT      WITH CONTRAST:No results found for this or any previous visit. WITHOUT CONTRAST: No results found for this or any previous visit. CXR      2-view: No results found for this or any previous visit. Portable: Results for orders placed during the hospital encounter of 12/15/22    XR CHEST PORTABLE    Narrative  EXAMINATION:  ONE XRAY VIEW OF THE CHEST    12/15/2022 9:41 pm    COMPARISON:  None. HISTORY:  ORDERING SYSTEM PROVIDED HISTORY: sirs no clear source  TECHNOLOGIST PROVIDED HISTORY:  Reason for exam:->sirs no clear source  What reading provider will be dictating this exam?->CRC    FINDINGS:  The lungs are without acute focal process. There is no effusion or  pneumothorax. The cardiomediastinal silhouette is without acute process. The  osseous structures are without acute process. Impression  No acute process. Echo No results found for this or any previous visit.             Assessment/Plan:    Active Hospital Problems    Diagnosis Date Noted    DKA, type 1, not at goal [facility]) [E10.10] 12/15/2022     Priority: Medium     DKA: Unknown type new onset diabetes: Aggressive fluid resuscitation. Half-normal saline running per DKA protocol transition to dextrose infusion along with insulin drip once glucose is under 250. Follow-up BMP mag and Phos every 4 hours. Consult endocrinology. Check hemoglobin A1c.  12/16 - cont insulin gtt, gap improved but not closed  RAVI: Likely from dehydration we will check urine urea urine creatinine urinalysis renal ultrasound avoid nephrotoxic agents repeat BMP  12/16 - improved with iv fluid  Lactic acidosis due to the above trend lactic acid monitor and evaluate for any signs or symptoms of acute bacterial infection. Check urinalysis CXR pending. DVT ppx lovenox.      Electronically signed by June Lynn MD on 12/16/2022 at 12:22 PM

## 2022-12-16 NOTE — PLAN OF CARE
Problem: Discharge Planning  Goal: Discharge to home or other facility with appropriate resources  12/16/2022 0358 by Kaity De Jesus RN  Outcome: Progressing  12/16/2022 0358 by Kaity De Jesus RN  Outcome: Progressing  Flowsheets (Taken 12/15/2022 2230)  Discharge to home or other facility with appropriate resources: Identify barriers to discharge with patient and caregiver     Problem: Skin/Tissue Integrity  Goal: Absence of new skin breakdown  Description: 1. Monitor for areas of redness and/or skin breakdown  2. Assess vascular access sites hourly  3. Every 4-6 hours minimum:  Change oxygen saturation probe site  4. Every 4-6 hours:  If on nasal continuous positive airway pressure, respiratory therapy assess nares and determine need for appliance change or resting period.   12/16/2022 0358 by Kaity De Jesus RN  Outcome: Progressing  12/16/2022 0358 by Kaity De Jesus RN  Outcome: Progressing     Problem: Safety - Adult  Goal: Free from fall injury  12/16/2022 0358 by Kaity De Jesus RN  Outcome: Progressing  12/16/2022 0358 by Kaity De Jesus RN  Outcome: Progressing     Problem: ABCDS Injury Assessment  Goal: Absence of physical injury  12/16/2022 0358 by Kaity De Jesus RN  Outcome: Progressing  12/16/2022 0358 by Kaity De Jesus RN  Outcome: Progressing

## 2022-12-16 NOTE — H&P
Hospital Medicine  History and Physical    Patient:  Bette Vallejo  MRN: [id number]    CHIEF COMPLAINT:    Chief Complaint   Patient presents with    Illness     Unwell x3days    Emesis    Fatigue       History Obtained From:  patient, electronic medical record  Primary Care Physician: Antonio Bean MD    HISTORY OF PRESENT ILLNESS:   The patient is a 58 y.o. male who presents with new onset diabetes with diabetic ketoacidosis. Patient is a 58-year-old male has no prior known medical problems and takes no home medications states that he has felt \\"sick \"for the past several days he admits to fevers chills nausea shortness of breath wheezing coughing. Patient was brought to the ED by family confused and obtunded was hypothermic on arrival with a temperature of 92.5 he was tachypneic and tachycardic with a blood pressure 116/85. Base metabolic panel showed a relatively high anion gap of 41 BUN and creatinine 49 and 2.87. Patient had a potassium of 5.4 glucose 133 beta hydroxybutyrate was elevated 133.7 ABG showed a pH of 7.056 with a PCO2 of 26. Patient was started on insulin infusion in the ED    Past Medical History:  No past medical history on file. Past Surgical History:  No past surgical history on file. Medications Prior to Admission:    Prior to Admission medications    Not on File       Allergies:  Patient has no known allergies. Social History:   TOBACCO:   has no history on file for tobacco use. ETOH:   has no history on file for alcohol use. OCCUPATION:  [employer]      Family History:   No family history on file. REVIEW OF SYSTEMS:  Ten systems reviewed and negative except for as above. Physical Exam:    Vitals: /85   Pulse (!) 109   Temp 97.7 °F (36.5 °C) (Rectal)   Resp 21   SpO2 99%   Constitutional: alert and oriented, dry mm   Skin: Skin color, texture, turgor normal. No rashes or lesions  Eyes:Eye: Normal external eye, conjunctiva, MARITZA.   ENT: Head: Normocephalic, no lesions, without obvious abnormality. Neck: no adenopathy, no carotid bruit, no JVD, supple, symmetrical, trachea midline and thyroid not enlarged, symmetric, no tenderness/mass/nodules  Respiratory: clear to auscultation bilaterally  Cardiovascular: regular rate and rhythm, S1, S2 normal, no murmur, click, rub or gallop  Gastrointestinal: soft, non-tender; bowel sounds normal; no masses,  no organomegaly  Genitourinary: Deferred  Musculoskeletal:extremities normal, atraumatic, no cyanosis or edema  Neurologic: Mental status AAOx3 No facial asymmetry or droop. Normal muscle strength b/l. Psychiatric: Appropriate mood and affect. Good insight and judgement  Hematologic: No obvious bruising or bleeding    Recent Labs     12/15/22  1715 12/15/22  1809   WBC 16.6*  --    HGB 14.4 17.0     --      Recent Labs     12/15/22  1809 12/15/22  1952   NA  --  133*   K  --  5.4*   CL  --  84*   CO2  --  8*   BUN  --  49*   CREATININE 2.8* 2.87*   GLUCOSE  --  1,261*   AST  --  6   ALT  --  11   BILITOT  --  0.5   ALKPHOS  --  92     EKG: sinus tachycardia     Assessment and Plan   DKA: Unknown type new onset diabetes: Aggressive fluid resuscitation. Half-normal saline running per DKA protocol transition to dextrose infusion along with insulin drip once glucose is under 250. Follow-up BMP mag and Phos every 4 hours. Consult endocrinology. Check hemoglobin A1c. RAVI: Likely from dehydration we will check urine urea urine creatinine urinalysis renal ultrasound avoid nephrotoxic agents repeat BMP  Lactic acidosis due to the above trend lactic acid monitor and evaluate for any signs or symptoms of acute bacterial infection. Check urinalysis CXR pending. DVT ppx lovenox.      Patient Active Problem List   Diagnosis Code    DKA, type 1, not at goal [facility]) E10.10       Mora Jackson DO  Admitting Hospitalist    Emergency Contact:

## 2022-12-16 NOTE — ED NOTES
Change of shift report received from [place]ren Burks [place]. This RN taking over care for pt at this time.       Alphonso Wilkerson RN  12/15/22 1922

## 2022-12-16 NOTE — ED NOTES
Introduced myself to pt and family at bedside. Pt re-position in bed. Empty saline discarded at this time. Pt sitting up with eyes open. Per family, pt appears more awake and alert than at time of arrival. Insulin gtt infusing at 8 Units/hour. Phlebotomist cartside to obtain blood draw.       Kyle Lewis RN  12/15/22 1950

## 2022-12-16 NOTE — ED NOTES
POC glucose obtained with result \"HI. \" Dr [employer] notified of \"HI\" result and insulin gtt at 8units/hr. V/O received to titrate insulin gtt to 4 units/hr and give 10U IV insulin.       Melyssa Diamond RN  12/15/22 2018

## 2022-12-16 NOTE — CONSULTS
Inpatient consult to Pulmonology  Consult performed by: Shanita William MD  Consult ordered by: Mary Medina DO          Admit Date: 12/15/2022    PCP:  Nelli Mayen MD    CHIEF COMPLAINT:     HPI:  The patient is a 58 y.o. male with no known significant past medical history who presented with being ill for few days. He is having fevers, chills and nausea. He was coughing as well. He was noted to be slightly confused according to family. He was hypothermic when he arrived to the ER. He was also tachypneic and tachycardic. Labs showed elevated BUN and creatinine. His potassium was elevated as well. His beta hydroxybutyrate was elevated. pH was 7.0. Serum bicarb was 9. He was started on insulin drip and was admitted to the ICU. He does not have a history of diabetes in the past.    Past Medical History:  No past medical history on file. Past Surgical History:    No past surgical history on file. Current Medications:     pantoprazole (PROTONIX) 40 mg injection  40 mg IntraVENous Daily    enoxaparin  40 mg SubCUTAneous Daily     Home Meds:  Prior to Admission medications    Not on File       Allergies:  Patient has no known allergies. Social History:        TOBACCO:   has no history on file for tobacco use. ETOH:   has no history on file for alcohol use. Family History:    None pertinent. Review of Systems  Complete review of systems done and negative unless otherwise noted positive.        Objective:     PHYSICAL EXAM:      VITALS:  BP (!) 141/81   Pulse (!) 105   Temp 97.6 °F (36.4 °C) (Oral)   Resp 14   Ht 5' 10\" (1.778 m)   Wt 171 lb 11.8 oz (77.9 kg)   SpO2 97%   BMI 24.64 kg/m²   24HR INTAKE/OUTPUT:    Intake/Output Summary (Last 24 hours) at 2022 0811  Last data filed at 2022 0717  Gross per 24 hour   Intake 2819.08 ml   Output 1950 ml   Net 869.08 ml     CURRENT PULSE OXIMETRY:  SpO2: 97 %  24HR PULSE OXIMETRY RANGE:  SpO2  Av.1 %  Min: 91 %  Max: 100 %    General appearance -ill appearing in moderate distress. Mental status - alert, oriented to person, place, and time  Eyes - pupils equal and reactive   Nose - normal and patent, no erythema   Neck - supple, no significant adenopathy. No thyromegaly. Chest -diminished bilaterally to auscultation, no wheezes, rales or rhonchi, symmetric air entry  Heart - normal rate, regular rhythm, normal S1, S2, no murmurs, rubs, clicks or gallops  Abdomen - soft, nontender, nondistended, no masses or organomegaly. Bowel sounds present.  No hernia   Rectal - deferred, not clinically indicated  Neurological - alert, oriented, normal speech, no focal findings or movement disorder noted, motor and sensory grossly normal bilaterally  Musculoskeletal - no joint tenderness, deformity or swelling  Extremities - peripheral pulses normal, no pedal edema, no clubbing or cyanosis  Skin - normal coloration and turgor, no rashes, no suspicious skin lesions noted  Psych: Normal mood          DATA:    CBC:   Recent Labs     12/15/22  1715 12/15/22  1809 12/16/22  0452   WBC 16.6*  --  14.4*   HGB 14.4 17.0 15.2   HCT 48.1  --  46.2     --  370     BMP:    Recent Labs     12/15/22  2322 12/16/22  0113 12/16/22  0244 12/16/22  0452    141  --  149*   K 4.9 5.3*  --  4.6   CL 98 102  --  109*   CO2 9* 9*  --  15*   BUN 47* 46*  --  42*   CREATININE 2.56* 2.43*  --  2.25*   GLUCOSE 877* 773* 667* 576*   CALCIUM 8.2* 8.7  --  8.9   MG 2.9* 3.1*  --  3.1*   PHOS 4.5 3.7  --  2.9     HEPATIC:   Recent Labs     12/15/22  1952   AST 6   ALT 11   BILITOT 0.5   ALKPHOS 92        Recent Labs     12/15/22  2233   COLORU Yellow   NITRU Negative   LEUKOCYTESUR Negative   GLUCOSEU >=1000*   KETUA >=80*   RBCUA 3-5*   WBCUA 6-9*   BACTERIA Negative            Radiology Review:    CXR portable: Results for orders placed during the hospital encounter of 12/15/22    XR CHEST PORTABLE    Narrative  EXAMINATION:  ONE XRAY VIEW OF THE

## 2022-12-16 NOTE — ED NOTES
Pharmacy called to verify if warmed fluids are able to infuse with insulin gtt. Received the okay to co-infuse warmed fluids with insulin.      Joon Medley RN  12/15/22 0764

## 2022-12-16 NOTE — PROGRESS NOTES
0800 am assessment completed as noted. Vss, will continue to monitor.  Electronically signed by Marti Buchanan RN on 12/16/2022 at 10:41 AM

## 2022-12-16 NOTE — PLAN OF CARE
Nutrition Problem #1: Inadequate oral intake  Intervention: Food and/or Nutrient Delivery:  (Recommend Carb Control 5 when able to start po)

## 2022-12-16 NOTE — CONSULTS
Endocrinology Consult      Sneha Mcnamara  1960  [id number]    Date of Admission:  12/15/2022  4:53 PM  Date of Consultation:12/16/2022    Consultant:Rudi Ureña PA-C  Supervising Physician: Acosta Laguna MD  PCP:  Liban Levine MD       Reason for Consultation: Hyperglycemia    C/C:   Chief Complaint   Patient presents with    Illness     Unwell x3days    Emesis    Fatigue       Assessment-  New onset type 2 diabetes  DKA  RAVI    Plan-  Continue insulin drip  Monitor laboratory studies every 4 hours  Diabetic education ordered for next week when patient is more alert and aware  Labs ordered including hemoglobin A1c, C-peptide and ETHEL  Monitor glycemic control closely      Illness  Associated symptoms include fatigue, abdominal pain, nausea and vomiting. Pertinent negatives include no congestion, ear pain, headaches, rhinorrhea, sore throat, fever, chest pain, coughing, shortness of breath, wheezing, diarrhea or rash. Emesis   Associated symptoms include abdominal pain. Pertinent negatives include no arthralgias, chest pain, chills, coughing, diarrhea, dizziness, fever or headaches. Fatigue  Associated symptoms include abdominal pain, fatigue, nausea and vomiting. Pertinent negatives include no arthralgias, chest pain, chills, congestion, coughing, fever, headaches, rash or sore throat. History of Present Illness: Patient is a 66-year-old male presented to the ER after for 5 days of fevers, chills, and nausea. He was suffering from some mild confusion he was hypothermic tachypneic and tachycardiac. Laboratory studies showed severe acidosis with RAVI. Insulin drip has been started, IV fluids are running. Patient complains of nausea and severe fatigue. No previous history of diabetes. Allergies: No Known Allergies    PMH: Patient has no past medical history on file. PSH: Patient has no past surgical history on file.     Social History: Patient     Family History: Patientsfamily history is not on file.    ROS:  Review of Systems   Constitutional:  Positive for fatigue and unexpected weight change. Negative for chills and fever. HENT:  Negative for congestion, ear pain, postnasal drip, rhinorrhea, sinus pressure and sore throat. Eyes:  Negative for visual disturbance. Respiratory:  Negative for cough, shortness of breath and wheezing. Cardiovascular:  Negative for chest pain, palpitations and leg swelling. Gastrointestinal:  Positive for abdominal pain, nausea and vomiting. Negative for diarrhea. Endocrine: Positive for polydipsia and polyuria. Negative for cold intolerance and heat intolerance. Genitourinary:  Negative for difficulty urinating. Musculoskeletal:  Negative for arthralgias. Skin:  Negative for rash. Allergic/Immunologic: Negative for environmental allergies. Neurological:  Negative for dizziness and headaches. Hematological:  Does not bruise/bleed easily. Psychiatric/Behavioral:  Negative for dysphoric mood.       Current Meds: cefTRIAXone (ROCEPHIN) 1,000 mg in dextrose 5 % 50 mL IVPB mini-bag, Q24H  pantoprazole (PROTONIX) 40 mg in sodium chloride (PF) 0.9 % 10 mL injection, Q12H  lidocaine 2 % injection 5 mL, Once  sodium chloride flush 0.9 % injection 5-40 mL, 2 times per day  sodium chloride flush 0.9 % injection 5-40 mL, PRN  0.9 % sodium chloride infusion, PRN  0.9 % sodium chloride infusion, Once  dextrose bolus 10% 125 mL, PRN   Or  dextrose bolus 10% 250 mL, PRN  potassium chloride 10 mEq/100 mL IVPB (Peripheral Line), PRN  enoxaparin (LOVENOX) injection 40 mg, Daily  acetaminophen (TYLENOL) tablet 650 mg, Q6H PRN   Or  acetaminophen (TYLENOL) suppository 650 mg, Q6H PRN  0.45 % sodium chloride infusion, Continuous  dextrose 5 % and 0.45 % sodium chloride infusion, Continuous PRN  ondansetron (ZOFRAN-ODT) disintegrating tablet 4 mg, Q8H PRN   Or  ondansetron (ZOFRAN) injection 4 mg, Q6H PRN  insulin regular (HUMULIN R;NOVOLIN R) 100 Units in sodium chloride 0.9 % 100 mL infusion, Continuous  magnesium sulfate 1000 mg in dextrose 5% 100 mL IVPB, PRN  sodium phosphate 10 mmol in sodium chloride 0.9 % 250 mL IVPB, PRN   Or  sodium phosphate 15 mmol in dextrose 5 % 250 mL IVPB, PRN   Or  sodium phosphate 20 mmol in dextrose 5 % 500 mL IVPB, PRN          Vitals:  /88   Pulse (!) 113   Temp 97.8 °F (36.6 °C) (Axillary)   Resp 17   Ht 5' 10\" (1.778 m)   Wt 171 lb 11.8 oz (77.9 kg)   SpO2 92%   BMI 24.64 kg/m²   I/O (24Hr): Intake/Output Summary (Last 24 hours) at 12/16/2022 1449  Last data filed at 12/16/2022 1014  Gross per 24 hour   Intake 3531.09 ml   Output 2625 ml   Net 906.09 ml             Physical Exam  Constitutional:       Appearance: He is well-developed. He is ill-appearing. Comments: Appears thin and slightly malnourished   HENT:      Head: Normocephalic and atraumatic. Nose: No congestion. Mouth/Throat:      Comments: Dry mucous membranes  Eyes:      Conjunctiva/sclera: Conjunctivae normal.   Cardiovascular:      Rate and Rhythm: Normal rate and regular rhythm. Heart sounds: Normal heart sounds. Pulmonary:      Effort: Pulmonary effort is normal.      Breath sounds: Normal breath sounds. Abdominal:      General: Bowel sounds are normal.      Palpations: Abdomen is soft. Musculoskeletal:         General: Normal range of motion. Cervical back: Normal range of motion and neck supple. Skin:     General: Skin is warm and dry. Neurological:      General: No focal deficit present. Mental Status: He is alert and oriented to person, place, and time.    Psychiatric:         Mood and Affect: Mood normal.       Labs:  Lab Results   Component Value Date     (H) 12/16/2022    K 4.8 12/16/2022     (H) 12/16/2022    CO2 19 (L) 12/16/2022    BUN 34 (H) 12/16/2022    CREATININE 1.64 (H) 12/16/2022    GLUCOSE 390 (H) 12/16/2022    CALCIUM 8.4 (L) 12/16/2022    PROT 7.2 12/15/2022    LABALBU 3.7 12/15/2022

## 2022-12-16 NOTE — PROGRESS NOTES
Sae Guzman, seen for evaluation and education on Other: new DM , coraly homegoing with insulin . No results found for: LABA1C  No results found for: Romeo Morales has recently been diagnosed with Type  1 diabetes per history. Survival Skill Topics discussed:  [x] Type 2 Diabetes diagnosis as chronic and progressive  [x] Eating healthy - [x] plate method [] portion control [] label reading [] carb counting  [x] sources of carbohydrates  [] Being active - current recommendations and safety  [] Medications - current medications: action, side effects, precautions   Patient's medications: _________________________________________  [x] Monitoring - frequency, & targets   [x] Signs and symptoms of hypo/hyperglycemia,  and Rule of 15 - handouts provided.   [] Sick day management - handout provided  [] Testing for ketones  [] Overview of chronic complications and how to prevent them from occurring    [x] Insulin administration   [x] New to insulin - instructed on insulin administration as follows:    [] Type of insulin - onset, peak and duration of each type  [x] Preparing   [] vial/syringe  [x]  pen    [x] Proper injection technique with return demonstrated via:  [] Self-injection of __ U ________  [x] Demonstrator Pillow injection  [x] Recommended Injection sites & importance of rotating  [x] Proper storage of insulin  [x] Importance of blood glucose monitoring when taking insulin  [x] Symptoms & treatment of hypoglycemia    [x] Use of glucose meter    [x] New to meter: Patient will  at pharmacy upon discharge  Instructed on:      [x] Overview of meter - 800 number on all machines for help  [x] Proper storage/ handling of meter and test strips  [x] Washing hands, turning on meter - setting date and time  [] Running  checks on the test strips using control solution   [x] Loading and using lancet device to obtain an adequate sample  [x] Obtaining blood sample and reading results on meter  [x] Proper disposal of used strips and lancets  X  Frequency of testing  [x] Importance of record keeping   [] When to call their PCM with abnormal results  [x] Desired blood glucose goals for optimal control - handout given  [x] The patient return demonstrated  [x] verbally [] self-tested BG ___________    The following support materials were provided for patient to take home:  [x] Diabetes Education [employer]  [] \"Type 2 Diabetes and Adding Insulin\" take home kit with survival skills fold out          [] Self-care diary/log for blood glucose and food          [] Diabetes ID card              [x] [facility] Diabetes Education brochure/ contact card              [] Other:________    RECOMMENDATIONS INPATIENT PLAN:  [] Dietician consult this admission for education on CHO diet and diet plan for home  [x] Would recommend follow -up education at outpatient diabetes education at [facility]. [x] An order has been placed for signature. Diabetes Education team will contact patient for appointment after discharge. [] Patient declines education at this time. Education brochure given for future reference  [] No further education is recommended at this time.      RN Bedside Support Plan:  [x] Bedside RN to support patient with administration of insulin at bedside  [] Bedside RN to support patient with SMBG - OK to use home meter along-side floor meter  [] Reinforce target BG ranges, Hyper and Hypo signs and symptoms and treatment  [] Bedside RN to coordinate BG Check with mealtime and insulin  [] Bedside RN to ensure snack at HS for patient on HS insulin  [x] Bedside RN to reinforce survival skills education and diabetes self-care     [x] Patient will need prescriptions for the following supplies at discharge:   [x] Preferred / formulary blood glucose meter, with strips and lancets for testing    [x] Insulin pen needle tips - 4mm david for insulin injections   [] Insulin syringe with needles-    ml, 6mm for insulin injections        Patient needs reinforcement. understanding  of survival skills education. Spoke with nurse Harriet krishna please have patient given insulin and reinforce survival skills information  . Mr Bill White was a little fatigued, blood sugar in the 500's , niece present but he declined for her to be included in teaching .      Fab Tristan RN

## 2022-12-16 NOTE — CARE COORDINATION
[facility] Case Management Initial Discharge Assessment    Met with Patient to discuss discharge plan. PCP: Re Amin MD                                Date of Last Visit: Denies having PCP or going to one    [facility] Patient: No        [facility] Notified: no    If no PCP, list provided? N/A  Pt in and out of sleep and will need PCP list once able to discuss. Discharge Planning    Living Arrangements: independently at home    Who do you live with? Lives alone     Who helps you with your care:  self    If lives at home:     Do you have any barriers navigating in your home? no    Patient can perform ADL? Yes    Current Services (outpatient and in home) :  None    Dialysis: No    Is transportation available to get to your appointments? Yes - Pt has niece at bedside and he did acknowledge her and she helped answer a few questions and says he does his own shopping cooking etc and has not gone to doctor in years. DME Equipment:  no    Respiratory equipment: None    Respiratory provider:  no     Pharmacy:  no    Consult with Medication Assistance Program?  No      Patient agreeable to [facility]? Declined    Patient agreeable to SNF/Rehab? Declined    Other discharge needs identified? Other Diabetic ed and physicians to follow with including endocrinologist    Does Patient Have a High-Risk for Readmission Diagnosis (CHF, PN, MI, COPD)? No         Initial Discharge Plan? (Note: please see concurrent daily documentation for any updates after initial note). I met with pt at bedside and his niece is visiting and they both answered some questions. Pt hs not been to doctor or taken any home meds prior. He lives alone and does his own care. We will follow and see how he ambulates and what he may need for home as he wakes up more and is able to participate.      Readmission Risk              Risk of Unplanned Readmission:  11         Electronically signed by Olga Correa on 12/16/2022 at 10:08 AM

## 2022-12-16 NOTE — ED NOTES
FSBS obtained, result reading \"HI. \" 10U ordered dose given. 2L normal saline infusing at this time. Family member x2 at [facility].       Gabby Kauffman RN  12/15/22 2112       Gabby Kauffman RN  12/15/22 2113

## 2022-12-16 NOTE — PROGRESS NOTES
Comprehensive Nutrition Assessment    Type and Reason for Visit:  Initial, Positive Nutrition Screen    Nutrition Recommendations/Plan:   Recommend Carb Control 5 when able to start po     Malnutrition Assessment:  Malnutrition Status: At risk for malnutrition (Comment) (12/16/22 1034)    Context:  Acute Illness     Findings of the 6 clinical characteristics of malnutrition:  Energy Intake:  Mild decrease in energy intake (Comment)  Weight Loss:  Unable to assess     Body Fat Loss:  No significant body fat loss     Muscle Mass Loss:  No significant muscle mass loss    Fluid Accumulation:  No significant fluid accumulation     Strength:  Not Performed    Nutrition Assessment:    Pt at risk for malnutrition, admitted with DKA and new onset diabetes. Currently NPO, noted coffee ground emesis overnight. Pt reported feeling unwell for ~ 3 days pta. Will monitor ability to start/tolerate po diet. Would recommend outpatient diabetes diet education upon discharge    Nutrition Related Findings:    Pt presents with new onset diabetes with diabetic ketoacidosis, has no prior known medical problems. Reports feeling unwell x 3 days pta. Noted pt to have 2 coffee ground emesis last night. labs: glucose 576 (>1200 on admission), Na+ 149, BUN 42, CR 2.25, Mg 1.3, RAVI likely due to dehydration per notes. IVF: 1/2 NS @ 250 ml/hr via peripheral line. BM pta, no edema noted Wound Type: None       Current Nutrition Intake & Therapies:    Average Meal Intake: NPO  Average Supplements Intake: NPO  Diet NPO Exceptions are: Ice Chips    Anthropometric Measures:  Height: 5' 10\" (177.8 cm)  Ideal Body Weight (IBW): 166 lbs (75 kg)    Admission Body Weight: 169 lb (76.7 kg)  Current Body Weight: 171 lb (77.6 kg) (12/16), Weight Source: Bed Scale  Current BMI (kg/m2): 24.5  Usual Body Weight:  (N/A)                       BMI Categories: Normal Weight (BMI 18.5-24. 9)    Estimated Daily Nutrient Needs:  Energy Requirements Based On: Kcal/kg  Weight Used for Energy Requirements: Current  Energy (kcal/day): 2331-2285 (kg x 27-28)  Weight Used for Protein Requirements: Current  Protein (g/day): 61-77 gm (kg x 0.8-1.0)  Method Used for Fluid Requirements: 1 ml/kcal  Fluid (ml/day): ~2000 ml    Nutrition Diagnosis:   Inadequate oral intake related to altered GI function as evidenced by NPO or clear liquid status due to medical condition, other (comment) (coffee ground emesis/DKA)    Nutrition Interventions:   Food and/or Nutrient Delivery:  (Recommend Carb Control 5 when able to start po)  Nutrition Education/Counseling: Education needed  Coordination of Nutrition Care: Continue to monitor while inpatient       Goals:     Goals: Initiate PO diet       Nutrition Monitoring and Evaluation:      Food/Nutrient Intake Outcomes: Diet Advancement/Tolerance, IVF Intake  Physical Signs/Symptoms Outcomes: Biochemical Data, GI Status, Weight    Discharge Planning:    Recommend pursue outpatient diabetes education     Ashlie Rogers RD, LD

## 2022-12-16 NOTE — CONSULTS
level: Not on file   Occupational History    Not on file   Tobacco Use    Smoking status: Not on file    Smokeless tobacco: Not on file   Substance and Sexual Activity    Alcohol use: Not on file    Drug use: Not on file    Sexual activity: Not on file   Other Topics Concern    Not on file   Social History Narrative    Not on file     Social Determinants of Health     Financial Resource Strain: Not on file   Food Insecurity: Not on file   Transportation Needs: Not on file   Physical Activity: Not on file   Stress: Not on file   Social Connections: Not on file   Intimate Partner Violence: Not on file   Housing Stability: Not on file      [] Unable to obtain due to ventilated and/ or neurologic status  Home Medications:   No medications prior to admission. Current Hospital Medications:   Scheduled Meds:   pantoprazole (PROTONIX) 40 mg injection  40 mg IntraVENous Daily    enoxaparin  40 mg SubCUTAneous Daily     Continuous Infusions:   sodium chloride 250 mL/hr at 12/16/22 0717    dextrose 5 % and 0.45 % NaCl      insulin 0.0188 Units/kg/hr (12/16/22 0758)     PRN Meds:.dextrose bolus **OR** dextrose bolus, potassium chloride, acetaminophen **OR** acetaminophen, dextrose 5 % and 0.45 % NaCl, ondansetron **OR** ondansetron, magnesium sulfate, sodium phosphate IVPB **OR** sodium phosphate IVPB **OR** sodium phosphate IVPB   sodium chloride 250 mL/hr at 12/16/22 0717    dextrose 5 % and 0.45 % NaCl      insulin 0.0188 Units/kg/hr (12/16/22 0758)      Allergies:   No Known Allergies   Review of Systems:    [x] CV, Resp, Neuro, , and all other systems reviewed and negative other than listed in HPI.     Objective Findings:     Vitals:   Vitals:    12/16/22 0600 12/16/22 0630 12/16/22 0700 12/16/22 0830   BP: (!) 146/98 (!) 141/94 (!) 141/81 115/79   Pulse: (!) 105 (!) 106 (!) 105 (!) 102   Resp: 15 20 14 13   Temp:       TempSrc:       SpO2: 95% 96% 97%    Weight:  171 lb 11.8 oz (77.9 kg)     Height:          Physical Examination:  General: Alert, oriented, ill-appearing  HEENT: Normocephalic, no scleral icterus. Neck: Soft/supple  Heart: Regular, no murmur, no rub/gallop. Lungs: clear to ascultation, no rales/wheezing/rhonchi. equal chest wall excursion. Abdomen: Abdomen soft, +mild upper abdominal tenderness. BS normal.  Extremities: no clubbing/cyanosis, no edema. Skin: Warm, dry, normal turgor, no rash, no bruise, no petichiae. Neuro: Without focal deficit, moves all extremities, no asterixis  Psych: normal affect    Results/ Medications reviewed 12/16/2022, 8:53 AM     Laboratory, Microbiology, Pathology, Radiology, Cardiology, Medications and Transcriptions reviewed  Scheduled Meds:   pantoprazole (PROTONIX) 40 mg injection  40 mg IntraVENous Daily    enoxaparin  40 mg SubCUTAneous Daily     Continuous Infusions:   sodium chloride 250 mL/hr at 12/16/22 0717    dextrose 5 % and 0.45 % NaCl      insulin 0.0188 Units/kg/hr (12/16/22 0758)       Recent Labs     12/15/22  1715 12/15/22  1809 12/16/22  0452   WBC 16.6*  --  14.4*   HGB 14.4 17.0 15.2   HCT 48.1  --  46.2   MCV 85.5  --  77.6*     --  370     Recent Labs     12/15/22  2322 12/16/22  0113 12/16/22  0452    141 149*   K 4.9 5.3* 4.6   CL 98 102 109*   CO2 9* 9* 15*   PHOS 4.5 3.7 2.9   BUN 47* 46* 42*   CREATININE 2.56* 2.43* 2.25*     Recent Labs     12/15/22  1952   AST 6   ALT 11   BILITOT 0.5   ALKPHOS 92     No results for input(s): LIPASE, AMYLASE in the last 72 hours. Recent Labs     12/15/22  1952   PROT 7.2     XR CHEST PORTABLE    Result Date: 12/16/2022  EXAMINATION: ONE XRAY VIEW OF THE CHEST 12/15/2022 9:41 pm COMPARISON: None. HISTORY: ORDERING SYSTEM PROVIDED HISTORY: sirs no clear source TECHNOLOGIST PROVIDED HISTORY: Reason for exam:->sirs no clear source What reading provider will be dictating this exam?->CRC FINDINGS: The lungs are without acute focal process. There is no effusion or pneumothorax.  The cardiomediastinal silhouette is without acute process. The osseous structures are without acute process. No acute process. Impression:   58 y.o. male presented with nausea/vomiting, abdominal pain, found to have new onset diabetes w/diabetic ketoacidosis, RAVI. On admit, glucose 1261, BUN 49, creatinine 2.87. GI consulted as patient had 2 episodes of coffee-ground colored emesis yesterday evening. None so far today. Hgb on admit 14.4, today Hgb stable at 15.2. No bowel movement overnight. Patient denies hematochezia or melena prior to admit. 44-year-old male with question for possible upper GI bleed, 2 reported episodes of coffee-ground colored emesis yesterday evening, ? Small Darcie-Gomes tear in the context of ongoing nausea/vomiting, ? Gastritis versus other. Hgb remains stable at this time. No BM, no melena since admit. Patient likely has an element of gastroparesis given his uncontrolled diabetes. Plan:   - Medical management per primary/ICU team  - IV PPI -Protonix 40 mg IV twice daily  -Given stable hemoglobin, no need for urgent upper endoscopy. However, will consider pending patient's clinical course/optimization.  - Monitor H/H and transfuse accordingly  - Check H pylori Ab, eradicate if positive  - Avoid NSAID's    Comments: Thank you for allowing us to participate in the care of this patient. Will continue to follow. Please call if questions or concerns arise. Electronically signed by Afshin Mancilla MD on 12/16/2022 at 8:53 AM    Please note this report has been partially produced using speech recognition software and may cause contain errors related to that system including grammar, punctuation and spelling as well as words and phrases that may seem inappropriate. If there are questions or concerns please feel free to contact me to clarify.

## 2022-12-17 LAB
ANION GAP SERPL CALCULATED.3IONS-SCNC: 11 MEQ/L (ref 9–15)
ANION GAP SERPL CALCULATED.3IONS-SCNC: 11 MEQ/L (ref 9–15)
ANION GAP SERPL CALCULATED.3IONS-SCNC: 9 MEQ/L (ref 9–15)
ANION GAP SERPL CALCULATED.3IONS-SCNC: 9 MEQ/L (ref 9–15)
BASOPHILS ABSOLUTE: 0 K/UL (ref 0–0.2)
BASOPHILS RELATIVE PERCENT: 0.3 %
BUN BLDV-MCNC: 16 MG/DL (ref 8–23)
BUN BLDV-MCNC: 18 MG/DL (ref 8–23)
BUN BLDV-MCNC: 18 MG/DL (ref 8–23)
BUN BLDV-MCNC: 20 MG/DL (ref 8–23)
CALCIUM SERPL-MCNC: 7.9 MG/DL (ref 8.5–9.9)
CALCIUM SERPL-MCNC: 8.1 MG/DL (ref 8.5–9.9)
CALCIUM SERPL-MCNC: 8.3 MG/DL (ref 8.5–9.9)
CALCIUM SERPL-MCNC: 8.3 MG/DL (ref 8.5–9.9)
CHLORIDE BLD-SCNC: 108 MEQ/L (ref 95–107)
CHLORIDE BLD-SCNC: 112 MEQ/L (ref 95–107)
CHLORIDE BLD-SCNC: 115 MEQ/L (ref 95–107)
CHLORIDE BLD-SCNC: 116 MEQ/L (ref 95–107)
CO2: 22 MEQ/L (ref 20–31)
CO2: 23 MEQ/L (ref 20–31)
CO2: 23 MEQ/L (ref 20–31)
CO2: 24 MEQ/L (ref 20–31)
CREAT SERPL-MCNC: 1.1 MG/DL (ref 0.7–1.2)
CREAT SERPL-MCNC: 1.22 MG/DL (ref 0.7–1.2)
CREAT SERPL-MCNC: 1.23 MG/DL (ref 0.7–1.2)
CREAT SERPL-MCNC: 1.24 MG/DL (ref 0.7–1.2)
EOSINOPHILS ABSOLUTE: 0 K/UL (ref 0–0.7)
EOSINOPHILS RELATIVE PERCENT: 0 %
GFR SERPL CREATININE-BSD FRML MDRD: >60 ML/MIN/{1.73_M2}
GLUCOSE BLD-MCNC: 108 MG/DL (ref 70–99)
GLUCOSE BLD-MCNC: 121 MG/DL (ref 70–99)
GLUCOSE BLD-MCNC: 142 MG/DL (ref 70–99)
GLUCOSE BLD-MCNC: 164 MG/DL (ref 70–99)
GLUCOSE BLD-MCNC: 184 MG/DL (ref 70–99)
GLUCOSE BLD-MCNC: 201 MG/DL (ref 70–99)
GLUCOSE BLD-MCNC: 224 MG/DL (ref 70–99)
GLUCOSE BLD-MCNC: 239 MG/DL (ref 70–99)
GLUCOSE BLD-MCNC: 67 MG/DL (ref 70–99)
GLUCOSE BLD-MCNC: 95 MG/DL (ref 70–99)
HBA1C MFR BLD: 13 % (ref 4.8–5.9)
HBA1C MFR BLD: 13.1 % (ref 4.8–5.9)
HCT VFR BLD CALC: 34.3 % (ref 42–52)
HCT VFR BLD CALC: 34.4 % (ref 42–52)
HCT VFR BLD CALC: 37.9 % (ref 42–52)
HEMOGLOBIN: 11.6 G/DL (ref 14–18)
HEMOGLOBIN: 11.7 G/DL (ref 14–18)
HEMOGLOBIN: 12.4 G/DL (ref 14–18)
LYMPHOCYTES ABSOLUTE: 1.2 K/UL (ref 1–4.8)
LYMPHOCYTES RELATIVE PERCENT: 8.4 %
MAGNESIUM: 2.1 MG/DL (ref 1.7–2.4)
MAGNESIUM: 2.2 MG/DL (ref 1.7–2.4)
MAGNESIUM: 2.4 MG/DL (ref 1.7–2.4)
MAGNESIUM: 2.4 MG/DL (ref 1.7–2.4)
MCH RBC QN AUTO: 26.1 PG (ref 27–31.3)
MCHC RBC AUTO-ENTMCNC: 34.2 % (ref 33–37)
MCV RBC AUTO: 76.2 FL (ref 79–92.2)
MONOCYTES ABSOLUTE: 0.6 K/UL (ref 0.2–0.8)
MONOCYTES RELATIVE PERCENT: 4.5 %
NEUTROPHILS ABSOLUTE: 12 K/UL (ref 1.4–6.5)
NEUTROPHILS RELATIVE PERCENT: 86.8 %
PDW BLD-RTO: 11.9 % (ref 11.5–14.5)
PERFORMED ON: ABNORMAL
PERFORMED ON: NORMAL
PHOSPHORUS: 1.1 MG/DL (ref 2.3–4.8)
PHOSPHORUS: 1.5 MG/DL (ref 2.3–4.8)
PHOSPHORUS: 2.3 MG/DL (ref 2.3–4.8)
PHOSPHORUS: 2.3 MG/DL (ref 2.3–4.8)
PLATELET # BLD: 221 K/UL (ref 130–400)
POTASSIUM SERPL-SCNC: 3.7 MEQ/L (ref 3.4–4.9)
POTASSIUM SERPL-SCNC: 3.8 MEQ/L (ref 3.4–4.9)
POTASSIUM SERPL-SCNC: 4 MEQ/L (ref 3.4–4.9)
POTASSIUM SERPL-SCNC: 4 MEQ/L (ref 3.4–4.9)
RBC # BLD: 4.5 M/UL (ref 4.7–6.1)
SODIUM BLD-SCNC: 141 MEQ/L (ref 135–144)
SODIUM BLD-SCNC: 146 MEQ/L (ref 135–144)
SODIUM BLD-SCNC: 148 MEQ/L (ref 135–144)
SODIUM BLD-SCNC: 148 MEQ/L (ref 135–144)
URINE CULTURE, ROUTINE: NORMAL
WBC # BLD: 13.8 K/UL (ref 4.8–10.8)

## 2022-12-17 PROCEDURE — 6370000000 HC RX 637 (ALT 250 FOR IP): Performed by: INTERNAL MEDICINE

## 2022-12-17 PROCEDURE — 85018 HEMOGLOBIN: CPT

## 2022-12-17 PROCEDURE — A4216 STERILE WATER/SALINE, 10 ML: HCPCS | Performed by: INTERNAL MEDICINE

## 2022-12-17 PROCEDURE — 2580000003 HC RX 258: Performed by: INTERNAL MEDICINE

## 2022-12-17 PROCEDURE — 2500000003 HC RX 250 WO HCPCS: Performed by: INTERNAL MEDICINE

## 2022-12-17 PROCEDURE — C9113 INJ PANTOPRAZOLE SODIUM, VIA: HCPCS | Performed by: INTERNAL MEDICINE

## 2022-12-17 PROCEDURE — 6360000002 HC RX W HCPCS: Performed by: NURSE PRACTITIONER

## 2022-12-17 PROCEDURE — 80048 BASIC METABOLIC PNL TOTAL CA: CPT

## 2022-12-17 PROCEDURE — 85025 COMPLETE CBC W/AUTO DIFF WBC: CPT

## 2022-12-17 PROCEDURE — 83036 HEMOGLOBIN GLYCOSYLATED A1C: CPT

## 2022-12-17 PROCEDURE — 6360000002 HC RX W HCPCS: Performed by: INTERNAL MEDICINE

## 2022-12-17 PROCEDURE — 2580000003 HC RX 258: Performed by: NURSE PRACTITIONER

## 2022-12-17 PROCEDURE — 85014 HEMATOCRIT: CPT

## 2022-12-17 PROCEDURE — 83735 ASSAY OF MAGNESIUM: CPT

## 2022-12-17 PROCEDURE — 1210000000 HC MED SURG R&B

## 2022-12-17 PROCEDURE — 84100 ASSAY OF PHOSPHORUS: CPT

## 2022-12-17 RX ORDER — INSULIN LISPRO 100 [IU]/ML
0-4 INJECTION, SOLUTION INTRAVENOUS; SUBCUTANEOUS NIGHTLY
Status: DISCONTINUED | OUTPATIENT
Start: 2022-12-17 | End: 2022-12-19

## 2022-12-17 RX ORDER — DEXTROSE MONOHYDRATE 100 MG/ML
INJECTION, SOLUTION INTRAVENOUS CONTINUOUS PRN
Status: DISCONTINUED | OUTPATIENT
Start: 2022-12-17 | End: 2022-12-20 | Stop reason: HOSPADM

## 2022-12-17 RX ORDER — INSULIN GLARGINE 100 [IU]/ML
45 INJECTION, SOLUTION SUBCUTANEOUS NIGHTLY
Status: DISCONTINUED | OUTPATIENT
Start: 2022-12-17 | End: 2022-12-19

## 2022-12-17 RX ORDER — INSULIN LISPRO 100 [IU]/ML
15 INJECTION, SOLUTION INTRAVENOUS; SUBCUTANEOUS
Status: DISCONTINUED | OUTPATIENT
Start: 2022-12-17 | End: 2022-12-19

## 2022-12-17 RX ORDER — INSULIN LISPRO 100 [IU]/ML
0-4 INJECTION, SOLUTION INTRAVENOUS; SUBCUTANEOUS
Status: DISCONTINUED | OUTPATIENT
Start: 2022-12-17 | End: 2022-12-19

## 2022-12-17 RX ADMIN — ENOXAPARIN SODIUM 40 MG: 100 INJECTION SUBCUTANEOUS at 09:00

## 2022-12-17 RX ADMIN — INSULIN GLARGINE 45 UNITS: 100 INJECTION, SOLUTION SUBCUTANEOUS at 21:05

## 2022-12-17 RX ADMIN — Medication 10 ML: at 09:00

## 2022-12-17 RX ADMIN — SODIUM CHLORIDE, PRESERVATIVE FREE 40 MG: 5 INJECTION INTRAVENOUS at 21:05

## 2022-12-17 RX ADMIN — SODIUM CHLORIDE, PRESERVATIVE FREE 40 MG: 5 INJECTION INTRAVENOUS at 09:00

## 2022-12-17 RX ADMIN — INSULIN LISPRO 1 UNITS: 100 INJECTION, SOLUTION INTRAVENOUS; SUBCUTANEOUS at 17:14

## 2022-12-17 RX ADMIN — ACETAMINOPHEN 650 MG: 325 TABLET ORAL at 18:05

## 2022-12-17 RX ADMIN — INSULIN LISPRO 15 UNITS: 100 INJECTION, SOLUTION INTRAVENOUS; SUBCUTANEOUS at 01:31

## 2022-12-17 RX ADMIN — INSULIN LISPRO 15 UNITS: 100 INJECTION, SOLUTION INTRAVENOUS; SUBCUTANEOUS at 17:13

## 2022-12-17 RX ADMIN — ONDANSETRON 4 MG: 2 INJECTION INTRAMUSCULAR; INTRAVENOUS at 08:59

## 2022-12-17 RX ADMIN — CEFTRIAXONE SODIUM 1000 MG: 1 INJECTION, POWDER, FOR SOLUTION INTRAMUSCULAR; INTRAVENOUS at 12:52

## 2022-12-17 RX ADMIN — INSULIN GLARGINE 45 UNITS: 100 INJECTION, SOLUTION SUBCUTANEOUS at 01:31

## 2022-12-17 RX ADMIN — SODIUM PHOSPHATE, MONOBASIC, MONOHYDRATE 20 MMOL: 276; 142 INJECTION, SOLUTION INTRAVENOUS at 02:51

## 2022-12-17 ASSESSMENT — PAIN SCALES - GENERAL
PAINLEVEL_OUTOF10: 0
PAINLEVEL_OUTOF10: 2
PAINLEVEL_OUTOF10: 0

## 2022-12-17 NOTE — PROGRESS NOTES
Critical Care Progress Note    2022 10:50 AM    Subjective:   Admit Date: 12/15/2022  PCP: Zeynep Jalloh MD    Chief Complaint   Patient presents with    Illness     Unwell x3days    Emesis    Fatigue     Interval History: Has been doing better. Due to has been discontinued. Vitals has been stable. Urine output has been good. 14 points review of systems has been obtained and negative except to was mentioned in HPI. Medications:   Scheduled Meds:   insulin glargine  45 Units SubCUTAneous Nightly    insulin lispro  15 Units SubCUTAneous TID WC    insulin lispro  0-4 Units SubCUTAneous TID WC    insulin lispro  0-4 Units SubCUTAneous Nightly    cefTRIAXone (ROCEPHIN) IV  1,000 mg IntraVENous Q24H    pantoprazole (PROTONIX) 40 mg injection  40 mg IntraVENous Q12H    sodium chloride flush  5-40 mL IntraVENous 2 times per day    enoxaparin  40 mg SubCUTAneous Daily     Continuous Infusions:   dextrose      sodium chloride           Objective:   Vitals:   Temp (24hrs), Av.1 °F (36.7 °C), Min:97.8 °F (36.6 °C), Max:98.2 °F (36.8 °C)    /69   Pulse (!) 120   Temp 98.2 °F (36.8 °C) (Oral)   Resp (!) 33   Ht 5' 10\" (1.778 m)   Wt 184 lb 4.9 oz (83.6 kg)   SpO2 96%   BMI 26.44 kg/m²   I/O:24HR INTAKE/OUTPUT:    Intake/Output Summary (Last 24 hours) at 2022 1050  Last data filed at 2022 0610  Gross per 24 hour   Intake 3498.36 ml   Output 500 ml   Net 2998.36 ml      0701 -  0700  In: 5018.1 [I.V.:4337]  Out: 8488 [Urine:1175]  CVP:           Physical Exam:    General appearance -ill appearing in moderate distress. Mental status - alert, oriented to person, place, and time  Eyes - pupils equal and reactive   Nose - normal and patent, no erythema   Neck - supple, no significant adenopathy. No thyromegaly.    Chest -diminished bilaterally to auscultation, no wheezes, rales or rhonchi, symmetric air entry  Heart - normal rate, regular rhythm, normal S1, S2, no murmurs, rubs, clicks or gallops  Abdomen - soft, nontender, nondistended, no masses or organomegaly. Bowel sounds present. No hernia   Rectal - deferred, not clinically indicated  Neurological - alert, oriented, normal speech, no focal findings or movement disorder noted, motor and sensory grossly normal bilaterally  Musculoskeletal - no joint tenderness, deformity or swelling  Extremities - peripheral pulses normal, no pedal edema, no clubbing or cyanosis  Skin - normal coloration and turgor, no rashes, no suspicious skin lesions noted  Psych: Normal mood            BMP:    Recent Labs     12/16/22  2230 12/17/22  0230 12/17/22  0608   * 148* 146*   K 4.0 4.0 3.7   * 115* 112*   CO2 23 22 23   BUN 20 18 18   CREATININE 1.22* 1.24* 1.23*   GLUCOSE 164* 67* 142*    . MG:3,PHOS:3)@  Ionized Calcium: No results found for: IONCA  CBC:   Recent Labs     12/16/22  0452 12/16/22  1435 12/17/22  0230 12/17/22  0600   WBC 14.4*  --   --  13.8*   HGB 15.2   < > 12.4* 11.7*     --   --  221    < > = values in this interval not displayed. ABG: No results for input(s): PH, PCO2, PO2 in the last 72 hours. Assessment and Plan:       Impression:      - Acute DKA. Newly diagnosed diabetes. This is better overall  - Severe metabolic acidosis due to above. This is improved  - Acute kidney injury. Has been improving.  - Hypernatremia. Slightly better  - Hypothermia. Rule out infectious process. Recommendations:     -Has been weaned off of insulin gtt. Doing better overall.  -Continue subcu insulin. Diabetic teaching. Endocrinology consultation.  -Cut down IV fluids. Start diet. -Replace electrolytes. -Continue antibiotics. Await cultures. Can discontinue if cultures are negative. -  Strict intake and output measurement. Watch kidney function closely. -   DVT and GI prophylaxis  Can be transferred out of the ICU.          Prophylaxis:  Stress ulcer: [] PPI Agent [] H2RA [] Sucralfate [] Other:   VTE: [] Enoxaparin  [] SC Heparin  [] SCD      Full Code            Electronically signed by Ramandeep Guerra MD on 12/17/2022 at 10:50 AM

## 2022-12-17 NOTE — PROGRESS NOTES
Received report from [facility]. Skin assessment completed with night shift RN. Morning assessment completed and medications given. Pt complaining of nausea and dry heaving, given zofran with minimal relief. GI aware and will follow. Morning and afternoon dose of humalog held as pt is NPO and BS below 200, Dr Alberto Chakraborty aware. Pt alert and oriented with intermittent confusion. Pt transferred to 2W, report given to 2W nurse.

## 2022-12-17 NOTE — PROGRESS NOTES
Shift Summary:   Patient had uneventful shift. Insulin drip was able to be turned off per Dr. Carson Garcia- see new orders. Full bed bath and linen change were given- patient tolerated well.    Electronically signed by Elizabeth Uriostegui RN on 12/17/2022 at 7:42 AM

## 2022-12-17 NOTE — PROGRESS NOTES
Hospitalist Daily Progress Note  Name: Deandre Rowan  Age: 58 y.o. Gender: male  CodeStatus: Full Code  Allergies: No Known Allergies    Chief Complaint:Illness (Tura Bright), Emesis, and Fatigue      Primary Care Provider: Janes Turk MD    InpatientTreatment Team: Treatment Team: Attending Provider: Jaylan Melton MD; Consulting Physician: Maria Antonia Winslow PA-C; Consulting Physician: Robyn Lau MD; Consulting Physician: Nancy Sanchez MD; Respiratory Therapist (Day): Sneha Schmidt RCP; Registered Nurse: Jeffrey Duffy RN    Admission Date: 12/15/2022      Subjective: No chest pain, sob, minimal nausea. Resting in bed. Physical Exam  Vitals and nursing note reviewed. Constitutional:       Appearance: Normal appearance. Cardiovascular:      Rate and Rhythm: Normal rate and regular rhythm. Pulmonary:      Effort: Pulmonary effort is normal.      Breath sounds: Normal breath sounds. Abdominal:      General: Bowel sounds are normal.      Palpations: Abdomen is soft. Musculoskeletal:         General: Normal range of motion. Skin:     General: Skin is warm and dry. Neurological:      Mental Status: He is alert and oriented to person, place, and time. Mental status is at baseline.        Medications:  Reviewed    Infusion Medications:    dextrose      sodium chloride       Scheduled Medications:    insulin glargine  45 Units SubCUTAneous Nightly    insulin lispro  15 Units SubCUTAneous TID WC    insulin lispro  0-4 Units SubCUTAneous TID WC    insulin lispro  0-4 Units SubCUTAneous Nightly    cefTRIAXone (ROCEPHIN) IV  1,000 mg IntraVENous Q24H    pantoprazole (PROTONIX) 40 mg injection  40 mg IntraVENous Q12H    sodium chloride flush  5-40 mL IntraVENous 2 times per day    enoxaparin  40 mg SubCUTAneous Daily     PRN Meds: glucose, dextrose bolus **OR** dextrose bolus, glucagon (rDNA), dextrose, sodium chloride flush, sodium chloride, potassium chloride, acetaminophen **OR** acetaminophen, ondansetron **OR** ondansetron, magnesium sulfate, sodium phosphate IVPB **OR** sodium phosphate IVPB **OR** sodium phosphate IVPB    Labs:   Recent Labs     12/15/22  1715 12/15/22  1809 12/16/22  0452 12/16/22  1435 12/17/22  0230 12/17/22  0600 12/17/22  0830   WBC 16.6*  --  14.4*  --   --  13.8*  --    HGB 14.4   < > 15.2   < > 12.4* 11.7* 11.6*   HCT 48.1  --  46.2   < > 37.9* 34.3* 34.4*     --  370  --   --  221  --     < > = values in this interval not displayed. Recent Labs     12/17/22  0230 12/17/22  0608 12/17/22  1030   * 146* 141   K 4.0 3.7 3.8   * 112* 108*   CO2 22 23 24   BUN 18 18 16   CREATININE 1.24* 1.23* 1.10   CALCIUM 8.3* 7.9* 8.1*   PHOS 1.5* 2.3 2.3       Recent Labs     12/15/22  1952   AST 6   ALT 11   BILITOT 0.5   ALKPHOS 92       No results for input(s): INR in the last 72 hours. No results for input(s): [place] in the last 72 hours. Urinalysis:   Lab Results   Component Value Date/Time    NITRU Negative 12/15/2022 10:33 PM    WBCUA 6-9 12/15/2022 10:33 PM    BACTERIA Negative 12/15/2022 10:33 PM    RBCUA 3-5 12/15/2022 10:33 PM    BLOODU SMALL 12/15/2022 10:33 PM    SPECGRAV 1.026 12/15/2022 10:33 PM    GLUCOSEU >=1000 12/15/2022 10:33 PM       Radiology:   Most recent    Chest CT      WITH CONTRAST:No results found for this or any previous visit. WITHOUT CONTRAST: No results found for this or any previous visit. CXR      2-view: No results found for this or any previous visit. Portable: Results for orders placed during the hospital encounter of 12/15/22    XR CHEST PORTABLE    Narrative  EXAMINATION:  ONE XRAY VIEW OF THE CHEST    12/15/2022 9:41 pm    COMPARISON:  None.     HISTORY:  ORDERING SYSTEM PROVIDED HISTORY: sirs no clear source  TECHNOLOGIST PROVIDED HISTORY:  Reason for exam:->sirs no clear source  What reading provider will be dictating this exam?->CRC    FINDINGS:  The lungs are without acute focal process. There is no effusion or  pneumothorax. The cardiomediastinal silhouette is without acute process. The  osseous structures are without acute process. Impression  No acute process. Echo No results found for this or any previous visit. Assessment/Plan:    Active Hospital Problems    Diagnosis Date Noted    Diabetic ketoacidosis with coma associated with type 2 diabetes mellitus ([facility] [address] [E11.11] 12/16/2022     Priority: Medium    Coffee ground emesis [K92.0] 12/16/2022     Priority: Medium    DKA, type 1, not at goal [facility]) [E10.10] 12/15/2022     Priority: Medium     DKA: Unknown type new onset diabetes: Aggressive fluid resuscitation. Half-normal saline running per DKA protocol transition to dextrose infusion along with insulin drip once glucose is under 250. Follow-up BMP mag and Phos every 4 hours. Consult endocrinology. Check hemoglobin A1c.  12/16 - cont insulin gtt, gap improved but not closed  12/17 - gap closed, off gtt, planning to have endocrine arrange home medications. Nauseated, cont antiemetic. RAVI: Likely from dehydration we will check urine urea urine creatinine urinalysis renal ultrasound avoid nephrotoxic agents repeat BMP  12/16 - improved with iv fluid  Lactic acidosis due to the above trend lactic acid monitor and evaluate for any signs or symptoms of acute bacterial infection. Check urinalysis CXR pending. DVT ppx lovenox.      Electronically signed by Vivien Andres MD on 12/17/2022 at 6:42 PM

## 2022-12-18 LAB
ANION GAP SERPL CALCULATED.3IONS-SCNC: 10 MEQ/L (ref 9–15)
ANION GAP SERPL CALCULATED.3IONS-SCNC: 13 MEQ/L (ref 9–15)
BASOPHILS ABSOLUTE: 0.1 K/UL (ref 0–0.2)
BASOPHILS RELATIVE PERCENT: 0.5 %
BUN BLDV-MCNC: 16 MG/DL (ref 8–23)
BUN BLDV-MCNC: 17 MG/DL (ref 8–23)
CALCIUM SERPL-MCNC: 8.3 MG/DL (ref 8.5–9.9)
CALCIUM SERPL-MCNC: 8.4 MG/DL (ref 8.5–9.9)
CHLORIDE BLD-SCNC: 102 MEQ/L (ref 95–107)
CHLORIDE BLD-SCNC: 103 MEQ/L (ref 95–107)
CO2: 23 MEQ/L (ref 20–31)
CO2: 24 MEQ/L (ref 20–31)
CREAT SERPL-MCNC: 1.03 MG/DL (ref 0.7–1.2)
CREAT SERPL-MCNC: 1.06 MG/DL (ref 0.7–1.2)
EOSINOPHILS ABSOLUTE: 0 K/UL (ref 0–0.7)
EOSINOPHILS RELATIVE PERCENT: 0.1 %
GFR SERPL CREATININE-BSD FRML MDRD: >60 ML/MIN/{1.73_M2}
GFR SERPL CREATININE-BSD FRML MDRD: >60 ML/MIN/{1.73_M2}
GLUCOSE BLD-MCNC: 133 MG/DL (ref 70–99)
GLUCOSE BLD-MCNC: 229 MG/DL (ref 70–99)
GLUCOSE BLD-MCNC: 250 MG/DL (ref 70–99)
GLUCOSE BLD-MCNC: 253 MG/DL (ref 70–99)
GLUCOSE BLD-MCNC: 267 MG/DL (ref 70–99)
HCT VFR BLD CALC: 30.8 % (ref 42–52)
HCT VFR BLD CALC: 31.4 % (ref 42–52)
HEMOGLOBIN: 10.4 G/DL (ref 14–18)
HEMOGLOBIN: 10.5 G/DL (ref 14–18)
LYMPHOCYTES ABSOLUTE: 1.4 K/UL (ref 1–4.8)
LYMPHOCYTES RELATIVE PERCENT: 10.1 %
MAGNESIUM: 2.1 MG/DL (ref 1.7–2.4)
MAGNESIUM: 2.2 MG/DL (ref 1.7–2.4)
MCH RBC QN AUTO: 25.9 PG (ref 27–31.3)
MCHC RBC AUTO-ENTMCNC: 33.6 % (ref 33–37)
MCV RBC AUTO: 77 FL (ref 79–92.2)
MONOCYTES ABSOLUTE: 0.5 K/UL (ref 0.2–0.8)
MONOCYTES RELATIVE PERCENT: 3.7 %
NEUTROPHILS ABSOLUTE: 12 K/UL (ref 1.4–6.5)
NEUTROPHILS RELATIVE PERCENT: 85.6 %
PDW BLD-RTO: 11.9 % (ref 11.5–14.5)
PERFORMED ON: ABNORMAL
PHOSPHORUS: 1.8 MG/DL (ref 2.3–4.8)
PHOSPHORUS: 2.1 MG/DL (ref 2.3–4.8)
PLATELET # BLD: 192 K/UL (ref 130–400)
POTASSIUM SERPL-SCNC: 3.6 MEQ/L (ref 3.4–4.9)
POTASSIUM SERPL-SCNC: 3.8 MEQ/L (ref 3.4–4.9)
RBC # BLD: 4.01 M/UL (ref 4.7–6.1)
SODIUM BLD-SCNC: 137 MEQ/L (ref 135–144)
SODIUM BLD-SCNC: 138 MEQ/L (ref 135–144)
WBC # BLD: 14 K/UL (ref 4.8–10.8)

## 2022-12-18 PROCEDURE — C9113 INJ PANTOPRAZOLE SODIUM, VIA: HCPCS | Performed by: INTERNAL MEDICINE

## 2022-12-18 PROCEDURE — 6360000002 HC RX W HCPCS: Performed by: INTERNAL MEDICINE

## 2022-12-18 PROCEDURE — 80048 BASIC METABOLIC PNL TOTAL CA: CPT

## 2022-12-18 PROCEDURE — 2580000003 HC RX 258: Performed by: INTERNAL MEDICINE

## 2022-12-18 PROCEDURE — 2580000003 HC RX 258: Performed by: NURSE PRACTITIONER

## 2022-12-18 PROCEDURE — 85025 COMPLETE CBC W/AUTO DIFF WBC: CPT

## 2022-12-18 PROCEDURE — 1210000000 HC MED SURG R&B

## 2022-12-18 PROCEDURE — 83735 ASSAY OF MAGNESIUM: CPT

## 2022-12-18 PROCEDURE — 6360000002 HC RX W HCPCS: Performed by: NURSE PRACTITIONER

## 2022-12-18 PROCEDURE — 84100 ASSAY OF PHOSPHORUS: CPT

## 2022-12-18 PROCEDURE — A4216 STERILE WATER/SALINE, 10 ML: HCPCS | Performed by: INTERNAL MEDICINE

## 2022-12-18 PROCEDURE — 6370000000 HC RX 637 (ALT 250 FOR IP): Performed by: INTERNAL MEDICINE

## 2022-12-18 PROCEDURE — 2500000003 HC RX 250 WO HCPCS: Performed by: INTERNAL MEDICINE

## 2022-12-18 RX ADMIN — ACETAMINOPHEN 650 MG: 325 TABLET ORAL at 03:51

## 2022-12-18 RX ADMIN — Medication 10 ML: at 08:55

## 2022-12-18 RX ADMIN — Medication 20 ML: at 00:16

## 2022-12-18 RX ADMIN — INSULIN LISPRO 15 UNITS: 100 INJECTION, SOLUTION INTRAVENOUS; SUBCUTANEOUS at 08:54

## 2022-12-18 RX ADMIN — CEFTRIAXONE SODIUM 1000 MG: 1 INJECTION, POWDER, FOR SOLUTION INTRAMUSCULAR; INTRAVENOUS at 12:35

## 2022-12-18 RX ADMIN — ENOXAPARIN SODIUM 40 MG: 100 INJECTION SUBCUTANEOUS at 08:54

## 2022-12-18 RX ADMIN — INSULIN GLARGINE 45 UNITS: 100 INJECTION, SOLUTION SUBCUTANEOUS at 20:29

## 2022-12-18 RX ADMIN — SODIUM PHOSPHATE, MONOBASIC, MONOHYDRATE AND SODIUM PHOSPHATE, DIBASIC, ANHYDROUS 15 MMOL: 142; 276 INJECTION, SOLUTION INTRAVENOUS at 03:54

## 2022-12-18 RX ADMIN — SODIUM CHLORIDE, PRESERVATIVE FREE 40 MG: 5 INJECTION INTRAVENOUS at 20:29

## 2022-12-18 RX ADMIN — INSULIN LISPRO 2 UNITS: 100 INJECTION, SOLUTION INTRAVENOUS; SUBCUTANEOUS at 08:54

## 2022-12-18 RX ADMIN — ONDANSETRON 4 MG: 4 TABLET, ORALLY DISINTEGRATING ORAL at 01:05

## 2022-12-18 RX ADMIN — ONDANSETRON 4 MG: 2 INJECTION INTRAMUSCULAR; INTRAVENOUS at 12:29

## 2022-12-18 RX ADMIN — SODIUM CHLORIDE, PRESERVATIVE FREE 40 MG: 5 INJECTION INTRAVENOUS at 08:53

## 2022-12-18 RX ADMIN — INSULIN LISPRO 2 UNITS: 100 INJECTION, SOLUTION INTRAVENOUS; SUBCUTANEOUS at 12:31

## 2022-12-18 RX ADMIN — INSULIN LISPRO 15 UNITS: 100 INJECTION, SOLUTION INTRAVENOUS; SUBCUTANEOUS at 12:31

## 2022-12-18 ASSESSMENT — PAIN SCALES - GENERAL
PAINLEVEL_OUTOF10: 0
PAINLEVEL_OUTOF10: 5
PAINLEVEL_OUTOF10: 5

## 2022-12-18 NOTE — PROGRESS NOTES
Pt alert and oriented in room. Pt is c/o abdominal pain but can't really describe, other than he just doesn't feel good. Minimal appetite, RN medicated pt with protonix IVP per [facility] and also around lunch gave him some zofran as he was still just not feeling good and unable to really eat w/o feeling sick. VSS, blood sugars still in the 200's and being treated appropriately. RN will continue to monitor.     Electronically signed by Giulia Fletcher RN on 12/18/2022 at 1:52 PM

## 2022-12-18 NOTE — PLAN OF CARE
Problem: Discharge Planning  Goal: Discharge to home or other facility with appropriate resources  Outcome: Progressing  Flowsheets (Taken 12/18/2022 1345)  Discharge to home or other facility with appropriate resources: Identify discharge learning needs (meds, wound care, etc)     Problem: Skin/Tissue Integrity  Goal: Absence of new skin breakdown  Description: 1. Monitor for areas of redness and/or skin breakdown  2. Assess vascular access sites hourly  3. Every 4-6 hours minimum:  Change oxygen saturation probe site  4. Every 4-6 hours:  If on nasal continuous positive airway pressure, respiratory therapy assess nares and determine need for appliance change or resting period.   Outcome: Progressing     Problem: Safety - Adult  Goal: Free from fall injury  Outcome: Progressing     Problem: ABCDS Injury Assessment  Goal: Absence of physical injury  Outcome: Progressing     Problem: Chronic Conditions and Co-morbidities  Goal: Patient's chronic conditions and co-morbidity symptoms are monitored and maintained or improved  Outcome: Progressing  Flowsheets (Taken 12/18/2022 1345)  Care Plan - Patient's Chronic Conditions and Co-Morbidity Symptoms are Monitored and Maintained or Improved: Monitor and assess patient's chronic conditions and comorbid symptoms for stability, deterioration, or improvement     Problem: Nutrition Deficit:  Goal: Optimize nutritional status  Outcome: Progressing     Problem: Pain  Goal: Verbalizes/displays adequate comfort level or baseline comfort level  Outcome: Progressing

## 2022-12-19 ENCOUNTER — APPOINTMENT (OUTPATIENT)
Dept: CT IMAGING | Age: 62
DRG: 420 | End: 2022-12-19
Payer: MEDICARE

## 2022-12-19 LAB
ANISOCYTOSIS: ABNORMAL
BASOPHILS ABSOLUTE: 0.1 K/UL (ref 0–0.2)
BASOPHILS RELATIVE PERCENT: 1 %
C-PEPTIDE: 0.2 NG/ML (ref 1.1–4.4)
EOSINOPHILS ABSOLUTE: 0 K/UL (ref 0–0.7)
EOSINOPHILS RELATIVE PERCENT: 0.4 %
GLUCOSE BLD-MCNC: 228 MG/DL (ref 70–99)
GLUCOSE BLD-MCNC: 270 MG/DL (ref 70–99)
GLUCOSE BLD-MCNC: 273 MG/DL (ref 70–99)
GLUCOSE BLD-MCNC: 320 MG/DL (ref 70–99)
HCT VFR BLD CALC: 28.5 % (ref 42–52)
HEMOGLOBIN: 10 G/DL (ref 14–18)
LYMPHOCYTES ABSOLUTE: 0.6 K/UL (ref 1–4.8)
LYMPHOCYTES RELATIVE PERCENT: 5 %
MCH RBC QN AUTO: 26.8 PG (ref 27–31.3)
MCHC RBC AUTO-ENTMCNC: 35.2 % (ref 33–37)
MCV RBC AUTO: 76.2 FL (ref 79–92.2)
MICROCYTES: ABNORMAL
MONOCYTES ABSOLUTE: 0.4 K/UL (ref 0.2–0.8)
MONOCYTES RELATIVE PERCENT: 2.8 %
NEUTROPHILS ABSOLUTE: 11.5 K/UL (ref 1.4–6.5)
NEUTROPHILS RELATIVE PERCENT: 92 %
NUCLEATED RED BLOOD CELLS: 1 /100 WBC
PDW BLD-RTO: 12.2 % (ref 11.5–14.5)
PERFORMED ON: ABNORMAL
PLATELET # BLD: 206 K/UL (ref 130–400)
PLATELET SLIDE REVIEW: NORMAL
POLYCHROMASIA: ABNORMAL
RBC # BLD: 3.73 M/UL (ref 4.7–6.1)
STOMATOCYTES: ABNORMAL
TARGET CELLS: ABNORMAL
WBC # BLD: 12.5 K/UL (ref 4.8–10.8)

## 2022-12-19 PROCEDURE — 6370000000 HC RX 637 (ALT 250 FOR IP): Performed by: INTERNAL MEDICINE

## 2022-12-19 PROCEDURE — 99232 SBSQ HOSP IP/OBS MODERATE 35: CPT | Performed by: INTERNAL MEDICINE

## 2022-12-19 PROCEDURE — 85025 COMPLETE CBC W/AUTO DIFF WBC: CPT

## 2022-12-19 PROCEDURE — 6360000002 HC RX W HCPCS: Performed by: INTERNAL MEDICINE

## 2022-12-19 PROCEDURE — 6360000002 HC RX W HCPCS

## 2022-12-19 PROCEDURE — A4216 STERILE WATER/SALINE, 10 ML: HCPCS | Performed by: INTERNAL MEDICINE

## 2022-12-19 PROCEDURE — 2580000003 HC RX 258: Performed by: INTERNAL MEDICINE

## 2022-12-19 PROCEDURE — 1210000000 HC MED SURG R&B

## 2022-12-19 PROCEDURE — 6360000002 HC RX W HCPCS: Performed by: NURSE PRACTITIONER

## 2022-12-19 PROCEDURE — C9113 INJ PANTOPRAZOLE SODIUM, VIA: HCPCS | Performed by: INTERNAL MEDICINE

## 2022-12-19 PROCEDURE — 2580000003 HC RX 258: Performed by: NURSE PRACTITIONER

## 2022-12-19 PROCEDURE — 74176 CT ABD & PELVIS W/O CONTRAST: CPT

## 2022-12-19 RX ORDER — INSULIN LISPRO 100 [IU]/ML
20 INJECTION, SOLUTION INTRAVENOUS; SUBCUTANEOUS
Status: DISCONTINUED | OUTPATIENT
Start: 2022-12-19 | End: 2022-12-20

## 2022-12-19 RX ORDER — INSULIN LISPRO 100 [IU]/ML
0-4 INJECTION, SOLUTION INTRAVENOUS; SUBCUTANEOUS NIGHTLY
Status: DISCONTINUED | OUTPATIENT
Start: 2022-12-19 | End: 2022-12-20 | Stop reason: HOSPADM

## 2022-12-19 RX ORDER — INSULIN GLARGINE 100 [IU]/ML
60 INJECTION, SOLUTION SUBCUTANEOUS NIGHTLY
Status: DISCONTINUED | OUTPATIENT
Start: 2022-12-19 | End: 2022-12-20

## 2022-12-19 RX ORDER — LACTULOSE 10 G/15ML
20 SOLUTION ORAL
Status: DISPENSED | OUTPATIENT
Start: 2022-12-19 | End: 2022-12-19

## 2022-12-19 RX ORDER — INSULIN LISPRO 100 [IU]/ML
0-8 INJECTION, SOLUTION INTRAVENOUS; SUBCUTANEOUS
Status: DISCONTINUED | OUTPATIENT
Start: 2022-12-19 | End: 2022-12-20 | Stop reason: HOSPADM

## 2022-12-19 RX ADMIN — CEFTRIAXONE SODIUM 1000 MG: 1 INJECTION, POWDER, FOR SOLUTION INTRAMUSCULAR; INTRAVENOUS at 13:45

## 2022-12-19 RX ADMIN — ALTEPLASE 1 MG: 2.2 INJECTION, POWDER, LYOPHILIZED, FOR SOLUTION INTRAVENOUS at 22:54

## 2022-12-19 RX ADMIN — INSULIN LISPRO 6 UNITS: 100 INJECTION, SOLUTION INTRAVENOUS; SUBCUTANEOUS at 17:32

## 2022-12-19 RX ADMIN — ACETAMINOPHEN 650 MG: 325 TABLET ORAL at 17:47

## 2022-12-19 RX ADMIN — INSULIN LISPRO 15 UNITS: 100 INJECTION, SOLUTION INTRAVENOUS; SUBCUTANEOUS at 09:06

## 2022-12-19 RX ADMIN — SODIUM CHLORIDE, PRESERVATIVE FREE 40 MG: 5 INJECTION INTRAVENOUS at 09:05

## 2022-12-19 RX ADMIN — INSULIN LISPRO 4 UNITS: 100 INJECTION, SOLUTION INTRAVENOUS; SUBCUTANEOUS at 13:35

## 2022-12-19 RX ADMIN — Medication 10 ML: at 13:48

## 2022-12-19 RX ADMIN — LACTULOSE 20 G: 20 SOLUTION ORAL at 17:33

## 2022-12-19 RX ADMIN — ENOXAPARIN SODIUM 40 MG: 100 INJECTION SUBCUTANEOUS at 09:05

## 2022-12-19 RX ADMIN — ACETAMINOPHEN 650 MG: 325 TABLET ORAL at 10:54

## 2022-12-19 RX ADMIN — INSULIN LISPRO 20 UNITS: 100 INJECTION, SOLUTION INTRAVENOUS; SUBCUTANEOUS at 17:33

## 2022-12-19 RX ADMIN — INSULIN LISPRO 15 UNITS: 100 INJECTION, SOLUTION INTRAVENOUS; SUBCUTANEOUS at 13:36

## 2022-12-19 RX ADMIN — INSULIN GLARGINE 60 UNITS: 100 INJECTION, SOLUTION SUBCUTANEOUS at 20:51

## 2022-12-19 RX ADMIN — LACTULOSE 20 G: 20 SOLUTION ORAL at 13:38

## 2022-12-19 ASSESSMENT — PAIN DESCRIPTION - DESCRIPTORS
DESCRIPTORS: ACHING;SORE;CRAMPING
DESCRIPTORS: CRAMPING
DESCRIPTORS: ACHING;SORE;CRAMPING

## 2022-12-19 ASSESSMENT — PAIN DESCRIPTION - LOCATION
LOCATION: ABDOMEN

## 2022-12-19 ASSESSMENT — PAIN SCALES - GENERAL
PAINLEVEL_OUTOF10: 4
PAINLEVEL_OUTOF10: 2
PAINLEVEL_OUTOF10: 4
PAINLEVEL_OUTOF10: 4

## 2022-12-19 ASSESSMENT — PAIN DESCRIPTION - ORIENTATION
ORIENTATION: LOWER;MID
ORIENTATION: MID
ORIENTATION: LOWER;MID

## 2022-12-19 ASSESSMENT — PAIN DESCRIPTION - PAIN TYPE: TYPE: CHRONIC PAIN

## 2022-12-19 NOTE — PROGRESS NOTES
[facility] Occupational Therapy      Date: 2022  Patient Name: Phuong Vasquez        MRN: [id number]  Account: [de-identified]   : 1960  (58 y.o.)  [id number]    Chart reviewed, attempted OT at [phone] for evaluation. Patient not seen 2° to:    Pt. off floor for test/procedure. Pt not in room    Will attempt again when able.     Electronically signed by Katie Engle OT on 2022 at 10:48 AM

## 2022-12-19 NOTE — PROGRESS NOTES
Hospitalist Daily Progress Note  Name: Evi Rob  Age: 58 y.o. Gender: male  CodeStatus: Full Code  Allergies: No Known Allergies    Chief Complaint:Illness (Bert Polvaldez), Emesis, and Fatigue      Primary Care Provider: Emilia Wooten MD    InpatientTreatment Team: Treatment Team: Attending Provider: Nel Snyder MD; Consulting Physician: Hanna Smith PA-C; Consulting Physician: Sylvia Rice MD; Registered Nurse: Fermin Mccullough RN; Patient Care Tech: Alexi Allen    Admission Date: 12/15/2022      Subjective: No chest pain, sob, minimal nausea. Resting in bed. Physical Exam  Vitals and nursing note reviewed. Constitutional:       Appearance: Normal appearance. Cardiovascular:      Rate and Rhythm: Normal rate and regular rhythm. Pulmonary:      Effort: Pulmonary effort is normal.      Breath sounds: Normal breath sounds. Abdominal:      General: Bowel sounds are normal.      Palpations: Abdomen is soft. Musculoskeletal:         General: Normal range of motion. Skin:     General: Skin is warm and dry. Neurological:      Mental Status: He is alert and oriented to person, place, and time. Mental status is at baseline.        Medications:  Reviewed    Infusion Medications:    dextrose      sodium chloride       Scheduled Medications:    insulin glargine  45 Units SubCUTAneous Nightly    insulin lispro  15 Units SubCUTAneous TID WC    insulin lispro  0-4 Units SubCUTAneous TID WC    insulin lispro  0-4 Units SubCUTAneous Nightly    cefTRIAXone (ROCEPHIN) IV  1,000 mg IntraVENous Q24H    pantoprazole (PROTONIX) 40 mg injection  40 mg IntraVENous Q12H    sodium chloride flush  5-40 mL IntraVENous 2 times per day    enoxaparin  40 mg SubCUTAneous Daily     PRN Meds: glucose, dextrose bolus **OR** dextrose bolus, glucagon (rDNA), dextrose, sodium chloride flush, sodium chloride, potassium chloride, acetaminophen **OR** acetaminophen, ondansetron **OR** ondansetron, magnesium sulfate, process. The  osseous structures are without acute process. Impression  No acute process. Echo No results found for this or any previous visit. Assessment/Plan:    Active Hospital Problems    Diagnosis Date Noted    Diabetic ketoacidosis with coma associated with type 2 diabetes mellitus ([facility] [address] [E11.11] 12/16/2022     Priority: Medium    Coffee ground emesis [K92.0] 12/16/2022     Priority: Medium    DKA, type 1, not at goal [facility]) [E10.10] 12/15/2022     Priority: Medium     DKA: Unknown type new onset diabetes: Aggressive fluid resuscitation. Half-normal saline running per DKA protocol transition to dextrose infusion along with insulin drip once glucose is under 250. Follow-up BMP mag and Phos every 4 hours. Consult endocrinology. Check hemoglobin A1c.  12/16 - cont insulin gtt, gap improved but not closed  12/17 - gap closed, off gtt, planning to have endocrine arrange home medications. Nauseated, cont antiemetic.  12/18 - remains nauseated, will consult gi to evaluate further. RAVI: Likely from dehydration we will check urine urea urine creatinine urinalysis renal ultrasound avoid nephrotoxic agents repeat BMP  12/16 - improved with iv fluid  Lactic acidosis due to the above trend lactic acid monitor and evaluate for any signs or symptoms of acute bacterial infection. Check urinalysis CXR pending. DVT ppx lovenox.      Dispo - pt/ot eval    Electronically signed by Vivien Andres MD on 12/19/2022 at 12:10 AM

## 2022-12-19 NOTE — PROGRESS NOTES
Spoke with patient and family (cousin) was at bedside, patient concerned with pending discharge tomorrow 12.20.2022. Reports he lives alone on the 17th floor of his building with no access to an elevator, states he does not feel strong enough to go home and resume independent living. Advised will liaise with case management regarding options for discharge, SNF vs Rehab. Pt still awaiting PT/OT input, noted OT attempted to see patient earlier in the morning but pt was off the unit.

## 2022-12-19 NOTE — CONSULTS
Consult Note    Reason for Consult:  appendicitis, inguinal hernia with possibility of combination for both    Requesting Physician:  Ted Basurto MD    HISTORY OF PRESENT ILLNESS:    The patient is a 58 y.o. male who presents with several days history of malaise prior to admission on 12/15. He was diagnosed with dka with mehdi and has been treated for such. He was having poor appetite prior to admission with generalized abdominal pain that was poorly localized. Of note, he has a chronic right inguinal hernia which has been present for over 35 years. The hernia is noted to be the same as previous with no new changes. Today, he underwent ct scan of his abdomen and pelvis which shows the hernia present with fluid and possibility of appendicitis. No past medical history on file. No past surgical history on file.     Prior to Admission medications    Not on File       Scheduled Meds:   insulin glargine  60 Units SubCUTAneous Nightly    insulin lispro  20 Units SubCUTAneous TID WC    insulin lispro  0-8 Units SubCUTAneous TID WC    insulin lispro  0-4 Units SubCUTAneous Nightly    lactulose  20 g Oral Q3H (SCHEDULED)    cefTRIAXone (ROCEPHIN) IV  1,000 mg IntraVENous Q24H    pantoprazole (PROTONIX) 40 mg injection  40 mg IntraVENous Q12H    sodium chloride flush  5-40 mL IntraVENous 2 times per day    enoxaparin  40 mg SubCUTAneous Daily     Continuous Infusions:   dextrose      sodium chloride       PRN Meds:glucose, dextrose bolus **OR** dextrose bolus, glucagon (rDNA), dextrose, sodium chloride flush, sodium chloride, potassium chloride, acetaminophen **OR** acetaminophen, ondansetron **OR** ondansetron, magnesium sulfate, sodium phosphate IVPB **OR** sodium phosphate IVPB **OR** sodium phosphate IVPB    No Known Allergies    Social History     Socioeconomic History    Marital status: Single     Spouse name: Not on file    Number of children: Not on file    Years of education: Not on file    Highest education level: Not on file   Occupational History    Not on file   Tobacco Use    Smoking status: Every Day     Packs/day: 0.50     Years: 35.00     Pack years: 17.50     Types: Cigarettes     Start date: 1/1/1987     Passive exposure: Never    Smokeless tobacco: Never   Vaping Use    Vaping Use: Never used   Substance and Sexual Activity    Alcohol use: Yes     Alcohol/week: 3.0 standard drinks     Types: 3 Shots of liquor per week    Drug use: Not Currently     Types: Crack Cocaine     Comment: stopped 8-9 years ago    Sexual activity: Not on file   Other Topics Concern    Not on file   Social History Narrative    Not on file     Social Determinants of Health     Financial Resource Strain: Not on file   Food Insecurity: Not on file   Transportation Needs: Not on file   Physical Activity: Not on file   Stress: Not on file   Social Connections: Not on file   Intimate Partner Violence: Not on file   Housing Stability: Not on file       No family history on file.     Review Of Systems:   CONSTITUTIONAL:  negative except for  fatigue, malaise, and anorexia  RESPIRATORY:  negative for  dry cough, wheezing, and chest pain  CARDIOVASCULAR:  negative for  chest pain, edema  GASTROINTESTINAL:  positive for nausea and abdominal pain  GENITOURINARY:  negative except for inguinal hernia  ENDOCRINE:  positive for diabetes and DKA  MUSCULOSKELETAL:  negative except for  decreased range of motion    Physical Exam:  Vitals:    12/18/22 0124 12/18/22 0700 12/18/22 1908 12/19/22 0731   BP: 112/67 110/75 107/71 108/70   Pulse: (!) 124  (!) 114 (!) 119   Resp: 20 18 18   Temp: 99 °F (37.2 °C)  99 °F (37.2 °C) 98.4 °F (36.9 °C)   TempSrc: Oral  Oral Oral   SpO2: 98%  94% 96%   Weight:       Height:           CONSTITUTIONAL:  awake, alert, cooperative, no apparent distress, and appears stated age and tired appearing  LUNGS:  No increased work of breathing, good air exchange, clear to auscultation bilaterally, no crackles or wheezing  CARDIOVASCULAR:  Normal apical impulse, regular rate and rhythm, normal S1 and S2, no S3 or S4, and no murmur noted  ABDOMEN:  soft, nt, nd  GENITAL/URINARY:  Inguinal hernia is reducible with some fluid. There are chronic scrotal changes to the right side but no erythema or exquite tenderness. The hernia was found to have chronic scarring on exam but no peritoneal signs were noted once it was reduced. There was some mild right upper and lower quadrant pain once the hernia was reduced.      Labs:  Recent Results (from the past 24 hour(s))   POCT Glucose    Collection Time: 12/18/22  5:39 PM   Result Value Ref Range    POC Glucose 133 (H) 70 - 99 mg/dl    Performed on ACCU-CHEK    CBC with Auto Differential    Collection Time: 12/19/22  5:07 AM   Result Value Ref Range    WBC 12.5 (H) 4.8 - 10.8 K/uL    RBC 3.73 (L) 4.70 - 6.10 M/uL    Hemoglobin 10.0 (L) 14.0 - 18.0 g/dL    Hematocrit 28.5 (L) 42.0 - 52.0 %    MCV 76.2 (L) 79.0 - 92.2 fL    MCH 26.8 (L) 27.0 - 31.3 pg    MCHC 35.2 33.0 - 37.0 %    RDW 12.2 11.5 - 14.5 %    Platelets 047 386 - 284 K/uL    PLATELET SLIDE REVIEW Normal     Neutrophils % 92.0 %    Lymphocytes % 5.0 %    Monocytes % 2.8 %    Eosinophils % 0.4 %    Basophils % 1.0 %    Neutrophils Absolute 11.5 (H) 1.4 - 6.5 K/uL    Lymphocytes Absolute 0.6 (L) 1.0 - 4.8 K/uL    Monocytes Absolute 0.4 0.2 - 0.8 K/uL    Eosinophils Absolute 0.0 0.0 - 0.7 K/uL    Basophils Absolute 0.1 0.0 - 0.2 K/uL    nRBC 1 /100 WBC    Anisocytosis 1+     Microcytes 1+     Polychromasia 1+     Stomatocytes 1+     Target Cells 1+    POCT Glucose    Collection Time: 12/19/22  8:25 AM   Result Value Ref Range    POC Glucose 270 (H) 70 - 99 mg/dl    Performed on ACCU-CHEK    POCT Glucose    Collection Time: 12/19/22 11:31 AM   Result Value Ref Range    POC Glucose 273 (H) 70 - 99 mg/dl    Performed on ACCU-CHEK      Assessment/Plan:  Mr. Chanelle King is a 59 yo male presenting to the ED after a few days of malaise in which he \"passed out\" he was found to be in dka and has been admitted to the medical service for treatment. He has been overall doing well, but now is still not progressing as expected. He had a ct showing ileus with right inguinal hernia and fluid. Upon my evaluation, the hernia appeared to be chronic and reducible. He does have some reactive fluid present, but no changes to suggest a perforated appendicitis in the hernia sac. We will follow for serial abdominal exams but it appears that he does not have acute appendicitis at this time. Thank you for the consult.      Electronically signed by Miguel Chilel DO on 12/19/22 at 4:15 PM EST

## 2022-12-19 NOTE — PLAN OF CARE
Problem: Discharge Planning  Goal: Discharge to home or other facility with appropriate resources  12/18/2022 2332 by Malu Torre RN  Outcome: Progressing  12/18/2022 1349 by Roxanne Dias RN  Outcome: Progressing  Flowsheets (Taken 12/18/2022 1345)  Discharge to home or other facility with appropriate resources: Identify discharge learning needs (meds, wound care, etc)     Problem: Skin/Tissue Integrity  Goal: Absence of new skin breakdown  Description: 1. Monitor for areas of redness and/or skin breakdown  2. Assess vascular access sites hourly  3. Every 4-6 hours minimum:  Change oxygen saturation probe site  4. Every 4-6 hours:  If on nasal continuous positive airway pressure, respiratory therapy assess nares and determine need for appliance change or resting period.   12/18/2022 2332 by Malu Torre RN  Outcome: Progressing  12/18/2022 1349 by Roxanne Dias RN  Outcome: Progressing     Problem: Safety - Adult  Goal: Free from fall injury  12/18/2022 2332 by Malu Torre RN  Outcome: Progressing  12/18/2022 1349 by Roxanne Dias RN  Outcome: Progressing     Problem: ABCDS Injury Assessment  Goal: Absence of physical injury  12/18/2022 2332 by Malu Torre RN  Outcome: Progressing  12/18/2022 1349 by Roxanne Dias RN  Outcome: Progressing     Problem: Chronic Conditions and Co-morbidities  Goal: Patient's chronic conditions and co-morbidity symptoms are monitored and maintained or improved  12/18/2022 2332 by Malu Torre RN  Outcome: Progressing  12/18/2022 1349 by Roxanne Dias RN  Outcome: Progressing  Flowsheets (Taken 12/18/2022 1345)  Care Plan - Patient's Chronic Conditions and Co-Morbidity Symptoms are Monitored and Maintained or Improved: Monitor and assess patient's chronic conditions and comorbid symptoms for stability, deterioration, or improvement     Problem: Nutrition Deficit:  Goal: Optimize nutritional status  12/18/2022 2332 by Malu Torre RN  Outcome: Progressing  12/18/2022 1349 by Giulia Fletcher RN  Outcome: Progressing     Problem: Pain  Goal: Verbalizes/displays adequate comfort level or baseline comfort level  12/18/2022 2332 by Megan Pace RN  Outcome: Progressing  12/18/2022 1349 by Giulia Fletcher RN  Outcome: Progressing     Problem: Neurosensory - Adult  Goal: Achieves stable or improved neurological status  Outcome: Progressing  Goal: Remains free of injury related to seizures activity  Outcome: Progressing     Problem: Cardiovascular - Adult  Goal: Maintains optimal cardiac output and hemodynamic stability  Outcome: Progressing  Goal: Absence of cardiac dysrhythmias or at baseline  Outcome: Progressing     Problem: Musculoskeletal - Adult  Goal: Return mobility to safest level of function  Outcome: Progressing  Goal: Maintain proper alignment of affected body part  Outcome: Progressing  Goal: Return ADL status to a safe level of function  Outcome: Progressing     Problem: Gastrointestinal - Adult  Goal: Minimal or absence of nausea and vomiting  Outcome: Progressing  Goal: Maintains or returns to baseline bowel function  Outcome: Progressing  Goal: Maintains adequate nutritional intake  Outcome: Progressing  Goal: Establish and maintain optimal ostomy function  Outcome: Progressing

## 2022-12-20 VITALS
HEIGHT: 70 IN | RESPIRATION RATE: 20 BRPM | DIASTOLIC BLOOD PRESSURE: 65 MMHG | WEIGHT: 184.3 LBS | HEART RATE: 107 BPM | TEMPERATURE: 98.1 F | OXYGEN SATURATION: 94 % | BODY MASS INDEX: 26.39 KG/M2 | SYSTOLIC BLOOD PRESSURE: 106 MMHG

## 2022-12-20 LAB
BASOPHILS ABSOLUTE: 0.1 K/UL (ref 0–0.2)
BASOPHILS RELATIVE PERCENT: 0.7 %
EOSINOPHILS ABSOLUTE: 0.1 K/UL (ref 0–0.7)
EOSINOPHILS RELATIVE PERCENT: 0.8 %
GLUCOSE BLD-MCNC: 148 MG/DL (ref 70–99)
GLUCOSE BLD-MCNC: 173 MG/DL (ref 70–99)
GLUCOSE BLD-MCNC: 73 MG/DL (ref 70–99)
HCT VFR BLD CALC: 26.3 % (ref 42–52)
HEMOGLOBIN: 8.5 G/DL (ref 14–18)
LYMPHOCYTES ABSOLUTE: 2.2 K/UL (ref 1–4.8)
LYMPHOCYTES RELATIVE PERCENT: 17 %
MCH RBC QN AUTO: 25.7 PG (ref 27–31.3)
MCHC RBC AUTO-ENTMCNC: 32.4 % (ref 33–37)
MCV RBC AUTO: 79.2 FL (ref 79–92.2)
MONOCYTES ABSOLUTE: 1.3 K/UL (ref 0.2–0.8)
MONOCYTES RELATIVE PERCENT: 10.2 %
NEUTROPHILS ABSOLUTE: 9.3 K/UL (ref 1.4–6.5)
NEUTROPHILS RELATIVE PERCENT: 71.3 %
PDW BLD-RTO: 12.3 % (ref 11.5–14.5)
PERFORMED ON: ABNORMAL
PERFORMED ON: ABNORMAL
PERFORMED ON: NORMAL
PLATELET # BLD: 241 K/UL (ref 130–400)
RBC # BLD: 3.32 M/UL (ref 4.7–6.1)
WBC # BLD: 13 K/UL (ref 4.8–10.8)

## 2022-12-20 PROCEDURE — 6370000000 HC RX 637 (ALT 250 FOR IP): Performed by: INTERNAL MEDICINE

## 2022-12-20 PROCEDURE — 99232 SBSQ HOSP IP/OBS MODERATE 35: CPT | Performed by: INTERNAL MEDICINE

## 2022-12-20 PROCEDURE — 97162 PT EVAL MOD COMPLEX 30 MIN: CPT

## 2022-12-20 PROCEDURE — 85025 COMPLETE CBC W/AUTO DIFF WBC: CPT

## 2022-12-20 PROCEDURE — 97165 OT EVAL LOW COMPLEX 30 MIN: CPT

## 2022-12-20 PROCEDURE — 6360000002 HC RX W HCPCS: Performed by: INTERNAL MEDICINE

## 2022-12-20 PROCEDURE — A4216 STERILE WATER/SALINE, 10 ML: HCPCS | Performed by: INTERNAL MEDICINE

## 2022-12-20 PROCEDURE — 2580000003 HC RX 258: Performed by: INTERNAL MEDICINE

## 2022-12-20 PROCEDURE — 2580000003 HC RX 258: Performed by: NURSE PRACTITIONER

## 2022-12-20 PROCEDURE — C9113 INJ PANTOPRAZOLE SODIUM, VIA: HCPCS | Performed by: INTERNAL MEDICINE

## 2022-12-20 PROCEDURE — 6360000002 HC RX W HCPCS: Performed by: NURSE PRACTITIONER

## 2022-12-20 RX ORDER — LANCETS 33 GAUGE
EACH MISCELLANEOUS
Qty: 200 EACH | Refills: 4 | Status: SHIPPED | OUTPATIENT
Start: 2022-12-20

## 2022-12-20 RX ORDER — INSULIN LISPRO 100 [IU]/ML
INJECTION, SOLUTION INTRAVENOUS; SUBCUTANEOUS
Qty: 15 ADJUSTABLE DOSE PRE-FILLED PEN SYRINGE | Refills: 3 | Status: SHIPPED | OUTPATIENT
Start: 2022-12-20

## 2022-12-20 RX ORDER — BLOOD SUGAR DIAGNOSTIC
STRIP MISCELLANEOUS
Qty: 200 EACH | Refills: 3 | Status: SHIPPED | OUTPATIENT
Start: 2022-12-20

## 2022-12-20 RX ORDER — INSULIN GLARGINE 100 [IU]/ML
INJECTION, SOLUTION SUBCUTANEOUS
Qty: 15 ADJUSTABLE DOSE PRE-FILLED PEN SYRINGE | Refills: 3 | Status: SHIPPED | OUTPATIENT
Start: 2022-12-20

## 2022-12-20 RX ORDER — INSULIN GLARGINE 100 [IU]/ML
50 INJECTION, SOLUTION SUBCUTANEOUS NIGHTLY
Status: DISCONTINUED | OUTPATIENT
Start: 2022-12-20 | End: 2022-12-20 | Stop reason: HOSPADM

## 2022-12-20 RX ORDER — BLOOD-GLUCOSE METER
EACH MISCELLANEOUS
Qty: 1 KIT | Refills: 0 | Status: SHIPPED | OUTPATIENT
Start: 2022-12-20

## 2022-12-20 RX ORDER — INSULIN LISPRO 100 [IU]/ML
14 INJECTION, SOLUTION INTRAVENOUS; SUBCUTANEOUS
Status: DISCONTINUED | OUTPATIENT
Start: 2022-12-20 | End: 2022-12-20 | Stop reason: HOSPADM

## 2022-12-20 RX ADMIN — Medication 10 ML: at 12:33

## 2022-12-20 RX ADMIN — SODIUM CHLORIDE, PRESERVATIVE FREE 40 MG: 5 INJECTION INTRAVENOUS at 09:41

## 2022-12-20 RX ADMIN — ENOXAPARIN SODIUM 40 MG: 100 INJECTION SUBCUTANEOUS at 09:38

## 2022-12-20 RX ADMIN — INSULIN LISPRO 20 UNITS: 100 INJECTION, SOLUTION INTRAVENOUS; SUBCUTANEOUS at 12:34

## 2022-12-20 RX ADMIN — INSULIN LISPRO 20 UNITS: 100 INJECTION, SOLUTION INTRAVENOUS; SUBCUTANEOUS at 09:38

## 2022-12-20 RX ADMIN — CEFTRIAXONE SODIUM 1000 MG: 1 INJECTION, POWDER, FOR SOLUTION INTRAMUSCULAR; INTRAVENOUS at 12:31

## 2022-12-20 NOTE — CARE COORDINATION
PER FELICIA WITH [address] THEY CAN ACCEPT PT.   UPDATED PT OF ACCEPTANCE OF [address].

## 2022-12-20 NOTE — PROGRESS NOTES
[facility] OCCUPATIONAL THERAPY EVALUATION - ACUTE     NAME: Davon Ascencio  : 1960 (58 y.o.)  MRN: [id number]  CODE STATUS: Full Code  Room: [place] [address]    Date of Service: 2022    Patient Diagnosis(es): DKA, type 1, not at goal [facility]) [E10.10]  Diabetic ketoacidosis with coma associated with type 2 diabetes mellitus ([facility] [address] [E11.11]   Patient Active Problem List    Diagnosis Date Noted    Diabetic ketoacidosis with coma associated with type 2 diabetes mellitus ([facility] [address] 2022    Coffee ground emesis 2022    DKA, type 1, not at goal [facility]) 12/15/2022        No past medical history on file. No past surgical history on file. Restrictions  Restrictions/Precautions: Up Ad Gloria        Safety Devices: Safety Devices  Type of Devices: Call light within reach; Left in bed     Patient's date of birth confirmed: Yes    General:  Chart Reviewed: Yes  Patient assessed for rehabilitation services?: Yes    Subjective  Subjective: \"I feel okay\"       Pain at start of treatment: No    Pain at end of treatment: No    Location:   Nursing notified: Not Applicable  RN:   Intervention: None    Prior Level of Function:  Social/Functional History  Lives With: Alone  Type of Home: [address] [place] [address] Drive:  (17th floor walk up apartment)  Home Access: Level entry (To building)  Bathroom Shower/Tub: Tub/Shower unit  Bathroom Toilet: Standard  ADL Assistance: Independent  Homemaking Assistance: Independent  Transfer Assistance: Independent  Active :  Yes  Additional Comments: Pt states he has a friend willing to let him stay with them until he is feeling able to manage the stairs to his apartment, he only has to be safe to be in the house alone because they work    OBJECTIVE:     Orientation Status:  Orientation  Overall Orientation Status: Within Functional Limits    Observation:  Observation/Palpation  Posture: Good  Observation: Pt alert and attentive, agreeable to OT evaluation    Cognition Status:  Cognition  Overall Cognitive Status: WFL    Perception Status:  Perception  Overall Perceptual Status: WFL    Vision and Hearing Status:  Hearing  Hearing: Within functional limits   Vision - Basic Assessment  Prior Vision: No visual deficits  Visual History: No significant visual history  Patient Visual Report: No visual complaint reported. Visual Field Cut: No    GROSS ASSESSMENT AROM/PROM:  AROM: Within functional limits       ROM:   LUE AROM (degrees)  LUE AROM : WFL  Left Hand AROM (degrees)  Left Hand AROM: WFL  RUE AROM (degrees)  RUE AROM : WFL  Right Hand AROM (degrees)  Right Hand AROM: WFL    UE STRENGTH:  Strength: Within functional limits    UE COORDINATION:  Coordination: Within functional limits    UE TONE:  Tone: Normal    UE SENSATION:  Sensation: Intact    Hand Dominance:  Hand Dominance  Hand Dominance: Right    ADL Status:  ADL  Feeding: Independent  Grooming: Modified independent   UE Bathing: Modified independent   LE Bathing: Modified independent   UE Dressing: Modified independent   LE Dressing: Modified independent   Toileting: Modified independent   Additional Comments: Simulated ADLs as above. Pt has been taking himself to the bathroom, no difficulty toileting per pt. No difficulty reaching feet. G safety  Toilet Transfers  Toilet - Technique: Ambulating  Equipment Used: Standard toilet  Toilet Transfer: Modified independent    Functional Mobility:    Transfers  Sit to stand: Modified independent  Stand to sit: Modified independent    Patient ambulated to/from bathroom with [employer] at Independent level. Pt utilized [employer] per his preference however also ambulated short distance without WW with no LOB noted.     Bed Mobility  Bed mobility  Supine to Sit: Independent  Sit to Supine: Modified independent    Seated and Standing Balance:  Balance  Sitting: Intact  Standing: Intact    Functional Endurance:  Activity Tolerance  Activity Tolerance: Patient Tolerated treatment well    D/C Recommendations:  OT D/C RECOMMENDATIONS  REQUIRES OT FOLLOW-UP: Yes    Equipment Recommendations:  OT Equipment Recommendations  Equipment Needed: No    OT Education:   Patient Education  Education Given To: Patient  Education Provided: Role of Therapy;Plan of Care  Education Method: Verbal  Barriers to Learning: None  Education Outcome: Verbalized understanding    OT Follow Up:   OT D/C RECOMMENDATIONS  REQUIRES OT FOLLOW-UP: Yes       Assessment/Discharge Disposition:  Assessment: Pt is a 58year old man from home alone who presents to [facility] with DKA type 1. Pt. demonstrates no significant functional deficits and requires no physical assist. No acute OT needs identified.   Prognosis: Good  No Skilled OT: Independent with ADL's, Safe to return home  Decision Making: Low Complexity  History: Pt's medical history is moderately complex  Exam: Pt has no performance deficits  Assistance / Modification: Pt requires no physical assist    AMPAC (Six Click) Self care Score   How much help for putting on and taking off regular lower body clothing?: None  How much help for Bathing?: None  How much help for Toileting?: None  How much help for putting on and taking off regular upper body clothing?: None  How much help for taking care of personal grooming?: None  How much help for eating meals?: None  [facility] In[facility] Daily Activity Raw Score: 24  AM-PAC Inpatient ADL T-Scale Score : 57.54  ADL Inpatient CMS 0-100% Score: 0    Therapy key for assistance levels -   Independent/Mod I = Pt. is able to perform task with no assistance but may require a device   Stand by assistance = Pt. does not perform task at an independent level but does not need physical assistance, requires verbal cues  Minimal, Moderate, Maximal Assistance = Pt. requires physical assistance (25%, 50%, 75% assist from helper) for task but is able to actively participate in task   Dependent = Pt. requires total assistance with task and is not able to actively participate with task completion     Plan:  Occupational Therapy Plan  Times Per Week: No acute OT    Goals:     Patient Goal: Patient goals : \"I want to get more independent\"     Discussed and agreed upon: Yes Comments:       Therapy Time:   Individual   Time In 1123   Time Out 1132   Minutes 9     Eval: 9 minutes     Electronically signed by:     MIRZA Montilla,   12/20/2022, 11:59 AM

## 2022-12-20 NOTE — PROGRESS NOTES
Hospitalist Progress Note      Date of Admission: 12/15/2022  Chief Complaint:    Chief Complaint   Patient presents with    Illness     Unwell x3days    Emesis    Fatigue     Subjective:  No new complaints. No nausea, vomiting, chest pain, or headache      Medications:    Infusion Medications    dextrose      sodium chloride       Scheduled Medications    insulin glargine  60 Units SubCUTAneous Nightly    insulin lispro  20 Units SubCUTAneous TID WC    insulin lispro  0-8 Units SubCUTAneous TID WC    insulin lispro  0-4 Units SubCUTAneous Nightly    cefTRIAXone (ROCEPHIN) IV  1,000 mg IntraVENous Q24H    pantoprazole (PROTONIX) 40 mg injection  40 mg IntraVENous Q12H    sodium chloride flush  5-40 mL IntraVENous 2 times per day    enoxaparin  40 mg SubCUTAneous Daily     PRN Meds: glucose, dextrose bolus **OR** dextrose bolus, glucagon (rDNA), dextrose, sodium chloride flush, sodium chloride, potassium chloride, acetaminophen **OR** acetaminophen, ondansetron **OR** ondansetron, magnesium sulfate, sodium phosphate IVPB **OR** sodium phosphate IVPB **OR** sodium phosphate IVPB  No intake or output data in the 24 hours ending 12/20/22 1141    Exam:  /72   Pulse (!) 110   Temp 98.2 °F (36.8 °C) (Oral)   Resp 16   Ht 5' 10\" (1.778 m)   Wt 184 lb 4.9 oz (83.6 kg)   SpO2 95%   BMI 26.44 kg/m²   Head: Normocephalic, atraumatic  Sclera clear  Neck JVD flat  Lungs: normal effort of breathing    Labs:   Recent Labs     12/18/22  0456 12/19/22  0507 12/20/22  0600   WBC 14.0* 12.5* 13.0*   HGB 10.4* 10.0* 8.5*   HCT 30.8* 28.5* 26.3*    206 241       Recent Labs     12/17/22  2230 12/18/22  0515    138   K 3.8 3.6    102   CO2 24 23   BUN 17 16   CREATININE 1.06 1.03   CALCIUM 8.3* 8.4*   PHOS 2.1* 1.8*       No results for input(s): INR in the last 72 hours. No results for input(s): Lattie Candelario in the last 72 hours.   Radiology:  CT ABDOMEN PELVIS WO CONTRAST Additional Contrast? None Final Result   1. Wall thickening and inflammation surround the cecum and appendix. The   appendix extends into a large hernia in the right inguinal canal and there is   fluid in the inguinal canal and right hemiscrotum. The appendix is enlarged   measuring up to 10 mm in diameter. Differential considerations include:   Right inguinal hernia with incarceration/strangulation versus appendicitis   complicating a right inguinal hernia (Amyand's hernia)      2. Mild abdominal ileus pattern. No free intraperitoneal air. RECOMMENDATIONS:   Surgical consultation recommended. US RETROPERITONEAL LIMITED   Final Result   Normal appearance of the imaged kidneys. No hydronephrosis. Bladder not shown on the images provided. IR PICC WO SQ PORT/PUMP > 5 YEARS   Final Result      XR CHEST PORTABLE   Final Result   No acute process. Assessment/Plan:    DKA: new onset. Dw endo. Type I based on C peptide level. Was on empiric antibiotics at time of admission secondary to DKA with concern of possible underlying septic process. No evidence of infection. Cultures negative as detailed below. Blood culture 1: 12/16 no growth to date  Blood culture 2: 12/16 no growth to date  Urine culture 12/15: No growth, final report. CT abdomen for abdominal discomfort ileus vs, appendicitis, vs hernia angel as per report. Gen surg consulted. Appreciate input. Mild leukocytosis of unknown significance. Will discontinue antibiotics.     Rayray: resolved    35 minutes total care time, >1/2 in unit/floor time and care coordination      Discharge plan: Can likely be discharged today if cleared from endocrinology perspective    Carmen Gomez MD ,MD

## 2022-12-20 NOTE — PROGRESS NOTES
Physical Therapy Med Surg Initial Assessment  Facility/Department: [facility]  Room: G409/B805-43       NAME: Bette Vallejo  : 1960 (58 y.o.)  MRN: [id number]  CODE STATUS: Full Code    Date of Service: 2022    Patient Diagnosis(es): DKA, type 1, not at goal [facility]) [E10.10]  Diabetic ketoacidosis with coma associated with type 2 diabetes mellitus ([facility] [address] [E11.11]   Chief Complaint   Patient presents with    Illness     Unwell x3days    Emesis    Fatigue     Patient Active Problem List    Diagnosis Date Noted    Diabetic ketoacidosis with coma associated with type 2 diabetes mellitus ([facility] [address] 2022    Coffee ground emesis 2022    DKA, type 1, not at goal [facility]) 12/15/2022        No past medical history on file. No past surgical history on file. Chart Reviewed: Yes  Patient assessed for rehabilitation services?: Yes  Family / Caregiver Present: Yes (son)  Diagnosis: DKA, type 1, not at goal [facility])  General Comment  Comments: Pt resting in bed - agreeable to PT evaluation    Restrictions:  Restrictions/Precautions: Up Ad Gloria     SUBJECTIVE:   Subjective: \"I'm alright. \"    Pain  Pain: Denies pre and post session pain. Prior Level of Function:  Social/Functional History  Lives With: Alone  Type of Home: Apartment  Home Layout:  (17steps to apartment)  Home Access: Level entry (To building)  Bathroom Shower/Tub: Tub/Shower unit  Bathroom Toilet: Standard  ADL Assistance: [address]: Independent  Transfer Assistance: Independent  Active :  Yes  Additional Comments: Pt states he has a friend willing to let him stay with them until he is feeling able to manage the stairs to his apartment, he only has to be safe to be in the house alone because they work    OBJECTIVE:   Vision  Vision: Within Functional Limits  Hearing: Within functional limits    Cognition:  Overall Orientation Status: Within Functional Limits  Follows Commands: Within Functional Limits  Overall Cognitive Status: WFL    Observation/Palpation  Observation: No acute distress noted. Pt pleasant and motivated. ROM:  RLE PROM: WFL  LLE PROM: WFL    Strength:  Strength RLE  Strength RLE: WFL  Strength LLE  Strength LLE: WFL    Neuro:  Balance  Sitting - Static: Good  Sitting - Dynamic: Good  Standing - Static: Good;-  Standing - Dynamic: Fair;+                      Tone: Normal    Sensation: Intact    Bed mobility  Supine to Sit: Independent  Sit to Supine: Independent    Transfers  Sit to Stand: Supervision  Stand to Sit: Supervision  Bed to Chair: Supervision    Ambulation  Surface: Level tile  Device: Rolling Walker; No Device  Assistance: Supervision;Stand by assistance  Quality of Gait: SBA without AD; Supervision with [address]. Pt with slow pacing. States that he feels \"wobbly. \"  Distance: 100ft with turn                   Activity Tolerance  Activity Tolerance: Patient tolerated treatment well    Patient Education  Education Given To: Patient  Education Provided: Role of Therapy;Plan of Care  Education Method: Verbal  Education Outcome: Verbalized understanding       ASSESSMENT:   Body Structures, Functions, Activity Limitations Requiring Skilled Therapeutic Intervention: Decreased functional mobility ; Decreased ADL status; Decreased safe awareness;Decreased endurance;Decreased balance  Decision Making: Medium Complexity  History: High  Exam: Med  Clinical Presentation: Med    Therapy Prognosis: Good  Barriers to Learning: none         DISCHARGE RECOMMENDATIONS:  Discharge Recommendations: Continue to assess pending progress, Patient would benefit from continued therapy after discharge    Assessment: Continued PT indicated to progress mobility and facilitate DC at highest level of indep and safety.   Requires PT Follow-Up: Yes       PLAN OF CARE:  Physcial Therapy Plan  General Plan:  (1-2visits total)  Current Treatment Recommendations: Strengthening, Balance training, Functional mobility training, Gait training, Transfer training, Stair training, Neuromuscular re-education, Pain management, Endurance training, Safety education & training, Patient/Caregiver education & training, Equipment evaluation, education, & procurement, Modalities, Home exercise program, Manual    Safety Devices  Type of Devices: All fall risk precautions in place    Goals:  Long Term Goals  Long Term Goal 1: Pt to manage 17 steps with HR and indep  Long Term Goal 2: Pt to complete transfers with indep  Long Term Goal 3: Pt to ambulate 150ft without AD indep  Long Term Goal 4: Pt to complete HEP with indep  Long Term Goal 5: Pt to complete TUG within 07.8KPU    [facility] (6 CLICK) BASIC MOBILITY  AM-PAC Inpatient Mobility Raw Score : 20     Therapy Time:   Individual   Time In 1145   Time Out 1158   Minutes [address], [address] [place] [address], 12/20/22 at 1:06 PM         Definitions for assistance levels  Independent = pt does not require any physical supervision or assistance from another person for activity completion. Device may be needed.   Stand by assistance = pt requires verbal cues or instructions from another person, close to but not touching, to perform the activity  Minimal assistance= pt performs 75% or more of the activity; assistance is required to complete the activity  Moderate assistance= pt performs 50% of the activity; assistance is required to complete the activity  Maximal assistance = pt performs 25% of the activity; assistance is required to complete the activity  Dependent = pt requires total physical assistance to accomplish the task

## 2022-12-20 NOTE — PROGRESS NOTES
CLINICAL PHARMACY NOTE: MEDS TO BEDS    Total # of Prescriptions Filled: 6   The following medications were delivered to the patient:  One Touch Verio Meter  One Touch Verio Test Strips   One Touch Verio Lancets  Lantus SoloStar  Insulin Lispro  Pen Needles    Additional Documentation:

## 2022-12-20 NOTE — CARE COORDINATION
Message sent via [employer] with request for Nola Garcia and scripts for walker and cane per pt request to Dr. Gee Herrera, awaiting response.

## 2022-12-20 NOTE — PLAN OF CARE
Problem: Discharge Planning  Goal: Discharge to home or other facility with appropriate resources  Outcome: Progressing     Problem: Skin/Tissue Integrity  Goal: Absence of new skin breakdown  Description: 1. Monitor for areas of redness and/or skin breakdown  2. Assess vascular access sites hourly  3. Every 4-6 hours minimum:  Change oxygen saturation probe site  4. Every 4-6 hours:  If on nasal continuous positive airway pressure, respiratory therapy assess nares and determine need for appliance change or resting period.   Outcome: Progressing     Problem: Safety - Adult  Goal: Free from fall injury  Outcome: Progressing     Problem: ABCDS Injury Assessment  Goal: Absence of physical injury  Outcome: Progressing     Problem: Chronic Conditions and Co-morbidities  Goal: Patient's chronic conditions and co-morbidity symptoms are monitored and maintained or improved  Outcome: Progressing     Problem: Nutrition Deficit:  Goal: Optimize nutritional status  Outcome: Progressing     Problem: Pain  Goal: Verbalizes/displays adequate comfort level or baseline comfort level  Outcome: Progressing     Problem: Neurosensory - Adult  Goal: Achieves stable or improved neurological status  Outcome: Progressing  Goal: Remains free of injury related to seizures activity  Outcome: Progressing     Problem: Cardiovascular - Adult  Goal: Maintains optimal cardiac output and hemodynamic stability  Outcome: Progressing  Goal: Absence of cardiac dysrhythmias or at baseline  Outcome: Progressing     Problem: Musculoskeletal - Adult  Goal: Return mobility to safest level of function  Outcome: Progressing  Goal: Maintain proper alignment of affected body part  Outcome: Progressing  Goal: Return ADL status to a safe level of function  Outcome: Progressing     Problem: Gastrointestinal - Adult  Goal: Minimal or absence of nausea and vomiting  Outcome: Progressing  Goal: Maintains or returns to baseline bowel function  Outcome: Progressing  Goal: Maintains adequate nutritional intake  Outcome: Progressing  Goal: Establish and maintain optimal ostomy function  Outcome: Progressing

## 2022-12-20 NOTE — PLAN OF CARE
See OT evaluation for all goals and OT POC.  Electronically signed by NIMISHA Butts/L on 12/20/2022 at 12:01 PM

## 2022-12-20 NOTE — DISCHARGE INSTR - DIET

## 2022-12-20 NOTE — PROGRESS NOTES
Hospitalist Progress Note      Date of Admission: 12/15/2022  Chief Complaint:    Chief Complaint   Patient presents with    Illness     Unwell x3days    Emesis    Fatigue     Subjective:  No new complaints. No nausea, vomiting, chest pain, or headache      Medications:    Infusion Medications    dextrose      sodium chloride       Scheduled Medications    insulin glargine  60 Units SubCUTAneous Nightly    insulin lispro  20 Units SubCUTAneous TID WC    insulin lispro  0-8 Units SubCUTAneous TID WC    insulin lispro  0-4 Units SubCUTAneous Nightly    lactulose  20 g Oral Q3H (SCHEDULED)    alteplase  1 mg IntraCATHeter Once    cefTRIAXone (ROCEPHIN) IV  1,000 mg IntraVENous Q24H    pantoprazole (PROTONIX) 40 mg injection  40 mg IntraVENous Q12H    sodium chloride flush  5-40 mL IntraVENous 2 times per day    enoxaparin  40 mg SubCUTAneous Daily     PRN Meds: glucose, dextrose bolus **OR** dextrose bolus, glucagon (rDNA), dextrose, sodium chloride flush, sodium chloride, potassium chloride, acetaminophen **OR** acetaminophen, ondansetron **OR** ondansetron, magnesium sulfate, sodium phosphate IVPB **OR** sodium phosphate IVPB **OR** sodium phosphate IVPB    Intake/Output Summary (Last 24 hours) at 12/19/2022 2148  Last data filed at 12/19/2022 0905  Gross per 24 hour   Intake --   Output 1000 ml   Net -1000 ml     Exam:  /83   Pulse (!) 114   Temp 98.4 °F (36.9 °C) (Oral)   Resp 16   Ht 5' 10\" (1.778 m)   Wt 184 lb 4.9 oz (83.6 kg)   SpO2 93%   BMI 26.44 kg/m²   Head: Normocephalic, atraumatic  Sclera clear  Neck JVD flat  Lungs: normal effort of breathing    Labs:   Recent Labs     12/17/22  0600 12/17/22  0830 12/17/22  2030 12/18/22  0456 12/19/22  0507   WBC 13.8*  --   --  14.0* 12.5*   HGB 11.7*   < > 10.5* 10.4* 10.0*   HCT 34.3*   < > 31.4* 30.8* 28.5*     --   --  192 206    < > = values in this interval not displayed.      Recent Labs     12/17/22  1030 12/17/22  2230 12/18/22  0515   NA 141 137 138   K 3.8 3.8 3.6   * 103 102   CO2 24 24 23   BUN 16 17 16   CREATININE 1.10 1.06 1.03   CALCIUM 8.1* 8.3* 8.4*   PHOS 2.3 2.1* 1.8*     No results for input(s): INR in the last 72 hours. No results for input(s): Arielle Magda in the last 72 hours. Radiology:  CT ABDOMEN PELVIS WO CONTRAST Additional Contrast? None   Final Result   1. Wall thickening and inflammation surround the cecum and appendix. The   appendix extends into a large hernia in the right inguinal canal and there is   fluid in the inguinal canal and right hemiscrotum. The appendix is enlarged   measuring up to 10 mm in diameter. Differential considerations include:   Right inguinal hernia with incarceration/strangulation versus appendicitis   complicating a right inguinal hernia (Amyand's hernia)      2. Mild abdominal ileus pattern. No free intraperitoneal air. RECOMMENDATIONS:   Surgical consultation recommended. US RETROPERITONEAL LIMITED   Final Result   Normal appearance of the imaged kidneys. No hydronephrosis. Bladder not shown on the images provided. XR CHEST PORTABLE   Final Result   No acute process. IR PICC WO SQ PORT/PUMP > 5 YEARS    (Results Pending)     Assessment/Plan:    DKA: new onset. Dw endo. Type I based on C peptide level. Can likely be dc'd in am.    Abd pain: ileus vs, appendicitis, vs hernia angel as per report. Gen surg consulted. Appreciate input.       Rayray: resolved    35 minutes total care time, >1/2 in unit/floor time and care coordination        Salvatore Monreal MD ,MD

## 2022-12-20 NOTE — PROGRESS NOTES
Progress Note  Date:2022       [facility]  Patient Toby Eason     YOB: 1960     Age:62 y.o. Chief complaint new onset diabetes/diabetic ketoacidosis    Subjective    Subjective:  Symptoms:  He reports weakness. Review of Systems   Constitutional:  Positive for fatigue. Endocrine: Negative. Neurological:  Positive for weakness. All other systems reviewed and are negative. Objective         Vitals Last 24 Hours:  TEMPERATURE:  Temp  Av.4 °F (36.9 °C)  Min: 98.4 °F (36.9 °C)  Max: 98.4 °F (36.9 °C)  RESPIRATIONS RANGE: Resp  Av  Min: 16  Max: 18  PULSE OXIMETRY RANGE: SpO2  Av.5 %  Min: 93 %  Max: 96 %  PULSE RANGE: Pulse  Av.7  Min: 114  Max: 123  BLOOD PRESSURE RANGE: Systolic (76NGB), URO:430 , Min:108 , AFV:603   ; Diastolic (61CQB), UTS:21, Min:70, Max:83    I/O (24Hr): Intake/Output Summary (Last 24 hours) at 2022 9614  Last data filed at 2022 0905  Gross per 24 hour   Intake --   Output 1000 ml   Net -1000 ml     Objective:  General Appearance:  Comfortable. Vital signs: (most recent): Blood pressure 113/83, pulse (!) 114, temperature 98.4 °F (36.9 °C), temperature source Oral, resp. rate 16, height 5' 10\" (1.778 m), weight 184 lb 4.9 oz (83.6 kg), SpO2 93 %. Vital signs are normal.    HEENT: Normal HEENT exam.    Lungs:  Normal effort and normal respiratory rate. Heart: Normal rate. Regular rhythm. Abdomen: Abdomen is soft. Bowel sounds are normal.     Extremities: Normal range of motion. Neurological: Patient is alert. Skin:  No rash.    Labs/Imaging/Diagnostics    Labs:  CBC:  Recent Labs     22  0600 22  0830 22  2030 22  0456 22  0507   WBC 13.8*  --   --  14.0* 12.5*   RBC 4.50*  --   --  4.01* 3.73*   HGB 11.7*   < > 10.5* 10.4* 10.0*   HCT 34.3*   < > 31.4* 30.8* 28.5*   MCV 76.2*  --   --  77.0* 76.2*   RDW 11.9  --   --  11.9 12.2     --   --  192 206    < > = values in this interval not displayed. CHEMISTRIES:  Recent Labs     12/17/22  1030 12/17/22  2230 12/18/22  0515    137 138   K 3.8 3.8 3.6   * 103 102   CO2 24 24 23   BUN 16 17 16   CREATININE 1.10 1.06 1.03   GLUCOSE 201* 229* 250*   PHOS 2.3 2.1* 1.8*   MG 2.1 2.1 2.2     PT/INR:No results for input(s): PROTIME, INR in the last 72 hours. APTT:No results for input(s): APTT in the last 72 hours. LIVER PROFILE:No results for input(s): AST, ALT, BILIDIR, BILITOT, ALKPHOS in the last 72 hours. Imaging Last 24 Hours:  CT ABDOMEN PELVIS WO CONTRAST Additional Contrast? None    Result Date: 12/19/2022  EXAMINATION: CT OF THE ABDOMEN AND PELVIS WITHOUT CONTRAST 12/19/2022 12:31 pm TECHNIQUE: CT of the abdomen and pelvis was performed without the administration of intravenous contrast. Multiplanar reformatted images are provided for review. Automated exposure control, iterative reconstruction, and/or weight based adjustment of the mA/kV was utilized to reduce the radiation dose to as low as reasonably achievable. COMPARISON: None. HISTORY: ORDERING SYSTEM PROVIDED HISTORY: abdominal pain TECHNOLOGIST PROVIDED HISTORY: Reason for exam:->abdominal pain Additional Contrast?->None What reading provider will be dictating this exam?->CRC FINDINGS: Lower Chest:  Visualized portion of the lower chest demonstrates no acute abnormality. Organs: Liver and spleen are normal in size without focal lesion. The unenhanced pancreas appears normal.  The adrenal glands and unenhanced kidneys have a normal CT appearance. Gallbladder is contracted. GI/Bowel: Gas fluid levels are noted in the stomach and dilated small bowel loops in the anterior central abdomen. Significant inflammation is seen surrounding the cecal tip and the enlarged appendix which extend into a right inguinal hernia. The appendiceal tip is located in the inguinal canal just above the scrotum.   There is fluid within the inguinal canal and right hemiscrotum. Sulcal Pelvis: No free pelvic fluid. Urinary bladder appears unremarkable. Prostate gland is normal.  Rectosigmoid colon is normal. Peritoneum/Retroperitoneum: No retroperitoneal mass or adenopathy identified. Aorta is nonaneurysmal. Bones/Soft Tissues:  No acute abnormality of the visualized osseous structures. Subcutaneous structures are generally unremarkable. Small fat containing umbilical hernia. Gas in subcu tissues of the anterior right lower abdomen likely related to recent injection. 1.  Wall thickening and inflammation surround the cecum and appendix. The appendix extends into a large hernia in the right inguinal canal and there is fluid in the inguinal canal and right hemiscrotum. The appendix is enlarged measuring up to 10 mm in diameter. Differential considerations include: Right inguinal hernia with incarceration/strangulation versus appendicitis complicating a right inguinal hernia (Amyand's hernia) 2. Mild abdominal ileus pattern. No free intraperitoneal air. RECOMMENDATIONS: Surgical consultation recommended. Assessment//Plan           Hospital Problems             Last Modified POA    * (Principal) DKA, type 1, not at goal [facility]) 12/15/2022 Yes    Diabetic ketoacidosis with coma associated with type 2 diabetes mellitus ([address] 12/16/2022 Yes    Coffee ground emesis 12/16/2022 Yes     Assessment:    Condition: In stable condition. Unchanged. (Uncontrolled diabetes status post DKA resolved  C-peptide was low going along with type 1 diabetes  Blood sugars are improving). Plan:   Discharge home. (Increase Lantus to 60 units at bedtime Humalog 20 units with each meals patient advised he will be going home on 4 insulin injections daily discussed continuous glucose monitoring insulin pump therapy as outpatient patient to follow-up in 2 to 4 weeks with endocrinology  Total time spent was 25 minutes).      Electronically signed by Pavan Whitmore MD on 12/19/22 at 10:14 PM EST

## 2022-12-20 NOTE — CARE COORDINATION
[facility] met with the pt and his friend to discuss his DC needs and plans. Pt does not feel ready to leave today but when offered a SNF he refused to consider a SNF. He plans to go home with his friend Hui. CHACE called Shivani to be sure she was able to help care for the pt. She has a one floor home and she is willing to assist the pt. Hui would like [facility] to come to her home to help. Her address is [address] [place] [address] and her phone number is [phone]. She is prepared to take the pt after 5 today. [address] is first choice.     PT IS REQUESTING A SCRIPT FOR A CANE AND A WALKER FOR DC.

## 2022-12-20 NOTE — PROGRESS NOTES
Pt refused last dose of lactulose stating he has had 3 BMs already and they are on the verge of diarrhea/iincontinence. Pt also declines self administering his insulin at this time despite encouragement and education. Both lumens of PICC found to be clotted with no blood return. Cathflo ordered and administered into red lumen with success. Blood return noted and lumen flushed.

## 2022-12-20 NOTE — DISCHARGE INSTR - COC
Continuity of Care Form    Patient Name: Chacho Perez   :  1960  MRN:  [id number]    Admit date:  12/15/2022  Discharge date:  2022    Code Status Order: Full Code   Advance Directives:     Admitting Physician:  No admitting provider for patient encounter. PCP: Afia Mcdaniel MD    Discharging Nurse: [facility] Unit/Room#: W223/U740-69  Discharging Unit Phone Number: [phone]    Emergency Contact:   Extended Emergency Contact Information  Primary Emergency Contact: Margarita Fontanez [address] Phone: [phone]  Relation: Brother/Sister  Secondary Emergency Contact: Mere Mckay [address] Phone: [phone]  Relation: Child    Past Surgical History:  No past surgical history on file. Immunization History: There is no immunization history on file for this patient.     Active Problems:  Patient Active Problem List   Diagnosis Code    DKA, type 1, not at goal [facility]) E10.10    Diabetic ketoacidosis with coma associated with type 2 diabetes mellitus ([facility] [address] E11.11    Coffee ground emesis K92.0       Isolation/Infection:   Isolation            No Isolation          Patient Infection Status       Infection Onset Added Last Indicated Last Indicated By Review Planned Expiration Resolved Resolved By    None active    Resolved    COVID-19 (Rule Out) 12/15/22 12/15/22 12/15/22 COVID-19, Rapid (Ordered)   12/15/22 Rule-Out Test Resulted            Nurse Assessment:  Last Vital Signs: /65   Pulse (!) 107   Temp 98.1 °F (36.7 °C) (Oral)   Resp 20   Ht 5' 10\" (1.778 m)   Wt 184 lb 4.9 oz (83.6 kg)   SpO2 94%   BMI 26.44 kg/m²     Last documented pain score (0-10 scale): Pain Level: 2  Last Weight:   Wt Readings from Last 1 Encounters:   22 184 lb 4.9 oz (83.6 kg)     Mental Status:  oriented and alert    IV Access:  - None    Nursing Mobility/ADLs:  Walking   Independent  Transfer  Independent  Bathing Independent  Dressing  Independent  Toileting  Independent  Feeding  Independent  Med Admin  Independent  Med Delivery   whole    Wound Care Documentation and Therapy:        Elimination:  Continence: Bowel: Yes  Bladder: Yes  Urinary Catheter: None   Colostomy/Ileostomy/Ileal Conduit: No       Date of Last BM: 12-  No intake or output data in the 24 hours ending 12/20/22 1815  I/O last 3 completed shifts:  In: -   Out: 1000 [Urine:1000]    Safety Concerns:     None    Impairments/Disabilities:      None    Nutrition Therapy:  Current Nutrition Therapy:   - Oral Diet:  Carb Control 4 carbs/meal (1800kcals/day)    Routes of Feeding: Oral  Liquids: Thin Liquids  Daily Fluid Restriction: no  Last Modified Barium Swallow with Video (Video Swallowing Test): not done    Treatments at the Time of Hospital Discharge:   Respiratory Treatments: na  Oxygen Therapy:  is not on home oxygen therapy.   Ventilator:    - No ventilator support    Rehab Therapies: diabetic education  Weight Bearing Status/Restrictions: No weight bearing restrictions  Other Medical Equipment (for information only, NOT a DME order):  na  Other Treatments: na    Patient's personal belongings (please select all that are sent with patient):  None    RN SIGNATURE:  Electronically signed by Cristel Torres RN on 12/20/22 at 6:19 PM EST    CASE MANAGEMENT/SOCIAL WORK SECTION    Inpatient Status Date: ***    Readmission Risk Assessment Score:  Readmission Risk              Risk of Unplanned Readmission:  15           Discharging to Facility/ Agency   Name:   Address:  Phone:  Fax:    Dialysis Facility (if applicable)   Name:  Address:  Dialysis Schedule:  Phone:  Fax:    / signature: {Esignature:[id number]}    PHYSICIAN SECTION    Prognosis: {Prognosis:9344639647}    Condition at Discharge: [facility] Patient Condition:413852913}    Rehab Potential (if transferring to Rehab): {Prognosis:2459775599}    Recommended Labs or Other Treatments After Discharge: ***    Physician Certification: I certify the above information and transfer of Yudy Lemus  is necessary for the continuing treatment of the diagnosis listed and that he requires {Admit to Appropriate Level of Care:81009} for {GREATER/LESS:064385669} 30 days.      Update Admission H&P: {CHP DME Changes in [facility]:779274626}    PHYSICIAN SIGNATURE:  {Esignature:[id number]}

## 2022-12-21 ENCOUNTER — TELEPHONE (OUTPATIENT)
Dept: ENDOCRINOLOGY | Age: 62
End: 2022-12-21

## 2022-12-21 LAB
BLOOD CULTURE, ROUTINE: NORMAL
CULTURE, BLOOD 2: NORMAL
GLUTAMIC ACID DECARB AB: <5 IU/ML (ref 0–5)

## 2022-12-21 NOTE — PROGRESS NOTES
Progress Note  Date:2022       Room:64W264-01  Patient Az Flores     YOB: 1960     Age:62 y.o. Chief complaint uncontrolled diabetes/DKA    Subjective    Subjective:  Symptoms:  Stable. He reports weakness. Diet:  Adequate intake. Activity level: Returning to normal.    Pain:  He reports no pain. Review of Systems   Constitutional:  Positive for fatigue. Neurological:  Positive for weakness. All other systems reviewed and are negative. Objective         Vitals Last 24 Hours:  TEMPERATURE:  Temp  Av.2 °F (36.8 °C)  Min: 98.1 °F (36.7 °C)  Max: 98.4 °F (36.9 °C)  RESPIRATIONS RANGE: Resp  Av  Min: 16  Max: 20  PULSE OXIMETRY RANGE: SpO2  Av.3 %  Min: 94 %  Max: 95 %  PULSE RANGE: Pulse  Av.8  Min: 105  Max: 112  BLOOD PRESSURE RANGE: Systolic (47ETM), KP , Min:106 , OUI:927   ; Diastolic (60CUS), WIO:42, Min:65, Max:80    I/O (24Hr): No intake or output data in the 24 hours ending 22  Objective:  General Appearance:  Comfortable. Vital signs: (most recent): Blood pressure 106/65, pulse (!) 107, temperature 98.1 °F (36.7 °C), temperature source Oral, resp. rate 20, height 5' 10\" (1.778 m), weight 184 lb 4.9 oz (83.6 kg), SpO2 94 %. Vital signs are normal.    HEENT: Normal HEENT exam.    Lungs:  Normal effort and normal respiratory rate. Heart: Normal rate. Abdomen: Abdomen is soft. Extremities: Normal range of motion. Neurological: Patient is alert. Skin:  No rash.    Labs/Imaging/Diagnostics    Labs:  CBC:  Recent Labs     22  0456 22  0507 22  0600   WBC 14.0* 12.5* 13.0*   RBC 4.01* 3.73* 3.32*   HGB 10.4* 10.0* 8.5*   HCT 30.8* 28.5* 26.3*   MCV 77.0* 76.2* 79.2   RDW 11.9 12.2 12.3    206 241     CHEMISTRIES:  Recent Labs     22  0515      K 3.6      CO2 23   BUN 16   CREATININE 1.03   GLUCOSE 250*   PHOS 1.8*   MG 2.2     PT/INR:No results for input(s): PROTIME, INR in the last 72 hours. APTT:No results for input(s): APTT in the last 72 hours. LIVER PROFILE:No results for input(s): AST, ALT, BILIDIR, BILITOT, ALKPHOS in the last 72 hours. Imaging Last 24 Hours:  CT ABDOMEN PELVIS WO CONTRAST Additional Contrast? None    Result Date: 12/19/2022  EXAMINATION: CT OF THE ABDOMEN AND PELVIS WITHOUT CONTRAST 12/19/2022 12:31 pm TECHNIQUE: CT of the abdomen and pelvis was performed without the administration of intravenous contrast. Multiplanar reformatted images are provided for review. Automated exposure control, iterative reconstruction, and/or weight based adjustment of the mA/kV was utilized to reduce the radiation dose to as low as reasonably achievable. COMPARISON: None. HISTORY: ORDERING SYSTEM PROVIDED HISTORY: abdominal pain TECHNOLOGIST PROVIDED HISTORY: Reason for exam:->abdominal pain Additional Contrast?->None What reading provider will be dictating this exam?->CRC FINDINGS: Lower Chest:  Visualized portion of the lower chest demonstrates no acute abnormality. Organs: Liver and spleen are normal in size without focal lesion. The unenhanced pancreas appears normal.  The adrenal glands and unenhanced kidneys have a normal CT appearance. Gallbladder is contracted. GI/Bowel: Gas fluid levels are noted in the stomach and dilated small bowel loops in the anterior central abdomen. Significant inflammation is seen surrounding the cecal tip and the enlarged appendix which extend into a right inguinal hernia. The appendiceal tip is located in the inguinal canal just above the scrotum. There is fluid within the inguinal canal and right hemiscrotum. Sulcal Pelvis: No free pelvic fluid. Urinary bladder appears unremarkable. Prostate gland is normal.  Rectosigmoid colon is normal. Peritoneum/Retroperitoneum: No retroperitoneal mass or adenopathy identified. Aorta is nonaneurysmal. Bones/Soft Tissues:  No acute abnormality of the visualized osseous structures. Subcutaneous structures are generally unremarkable. Small fat containing umbilical hernia. Gas in subcu tissues of the anterior right lower abdomen likely related to recent injection. 1.  Wall thickening and inflammation surround the cecum and appendix. The appendix extends into a large hernia in the right inguinal canal and there is fluid in the inguinal canal and right hemiscrotum. The appendix is enlarged measuring up to 10 mm in diameter. Differential considerations include: Right inguinal hernia with incarceration/strangulation versus appendicitis complicating a right inguinal hernia (Amyand's hernia) 2. Mild abdominal ileus pattern. No free intraperitoneal air. RECOMMENDATIONS: Surgical consultation recommended. Assessment//Plan           Hospital Problems             Last Modified POA    * (Principal) DKA, type 1, not at goal [facility]) 12/15/2022 Yes    Diabetic ketoacidosis with coma associated with type 2 diabetes mellitus ([facility] [address] 12/16/2022 Yes    Coffee ground emesis 12/16/2022 Yes     Assessment:    Condition: In stable condition. Unchanged. (Status post DKA resolved blood sugars are improving  Type 1 diabetes with low C-peptide  Weakness recent weight loss secondary to above). Plan:    (Okay to discharge patient on Lantus 50 units at bedtime Humalog 14 units with each meals patient to follow-up in the office in 2 to 4 weeks time  Discussed insulin pump therapy as outpatient compliance with diet  Reviewed nursing consultants note total time spent 25 minutes).      Electronically signed by Garnett Boxer, MD on 12/20/22 at 10:58 PM EST

## 2022-12-21 NOTE — TELEPHONE ENCOUNTER
Saima Swanson, intake nurse wants to talk to you about patient because he was discharged from the hospital and she wants to talk about him. Please contact her at [phone]. Thanks!

## 2022-12-22 ENCOUNTER — TELEPHONE (OUTPATIENT)
Dept: DIABETES SERVICES | Age: 62
End: 2022-12-22

## 2022-12-22 NOTE — PROGRESS NOTES
Call to [facility] to schedule IA, he says he has no transportation, unable to do ZOOM visit, no computer. He asks for help with his blood sugars 309 this am and 330 yesterday. He says he is takinghis Lantus at night and eating 3 meals a day and using his Humalog. We review carb sources and I refer him to the teaching material he was given while an inpatient, I instruct to limit his carbs to 1/4 of his plate and drink water. He would like a call next week.

## 2022-12-27 NOTE — CARE COORDINATION
Pt and his caregiver called to say that the pt needs to go to a SNF. He would like to go to [facility]. Call placed to [facility] to see if this can be done from home. Call placed to [facility] to inform them of the concerns and to see if they can assist with placement from home.

## 2022-12-28 ENCOUNTER — TELEPHONE (OUTPATIENT)
Dept: DIABETES SERVICES | Age: 62
End: 2022-12-28

## 2022-12-28 ENCOUNTER — TELEPHONE (OUTPATIENT)
Dept: ENDOCRINOLOGY | Age: 62
End: 2022-12-28

## 2022-12-28 NOTE — TELEPHONE ENCOUNTER
Sammy Ovalles [phone] from [facility] called stating pt is unable to take care of himself he will not check his BS and or take his insulin. Sammy Ovalles states he is just going to end up back in the hospital before he is able to even come in. She wants to know if you have any recommendations.   They are aware  is out

## 2022-12-28 NOTE — PROGRESS NOTES
Call to [facility], his blood sugars are fluctuating, he is not able to give his own insulin, his friend has been doing it for him. He is trying to get into a facility so that he can gets stronger and amongst other needs, learn how to give his own insulin. He just can't seem to do it, he has the physical ability. He has no questions for me, he has no further needs as he is unable to come in or complete a zoom meeting. He does have the survival skills packet and our number and I advise that if his situation changes to give us a call.

## 2023-01-03 NOTE — TELEPHONE ENCOUNTER
[facility], [phone], from [facility] called and is requesting that we send a freestyle sarahi 2 for the patient because he is not checking his BS because he is afraid of needles. She also wants to know if we can change his insulin to a morning dose. She is also requesting an oral diabetic medication that can help control his diabetes because he can take pills. Patient has a follow up on Friday and now they are requesting an insulin pump because they think that he can handle that. Thanks!

## 2023-01-04 RX ORDER — FLASH GLUCOSE SENSOR
KIT MISCELLANEOUS
Qty: 2 EACH | Refills: 5 | Status: SHIPPED | OUTPATIENT
Start: 2023-01-04

## 2023-01-04 RX ORDER — FLASH GLUCOSE SCANNING READER
EACH MISCELLANEOUS
Qty: 1 EACH | Refills: 0 | Status: SHIPPED | OUTPATIENT
Start: 2023-01-04

## 2023-01-04 NOTE — TELEPHONE ENCOUNTER
Prescription sent for freestyle sarahi patient to continue with his Lantus and Humalog pills would not work for him

## 2023-01-05 NOTE — TELEPHONE ENCOUNTER
Left vm to [facility] with RX info and for pt to continue with Lantus and Humalog per Dr. Ferdinand Casanova.

## 2023-01-06 ENCOUNTER — OFFICE VISIT (OUTPATIENT)
Dept: ENDOCRINOLOGY | Age: 63
End: 2023-01-06

## 2023-01-06 VITALS
WEIGHT: 190 LBS | HEIGHT: 70 IN | SYSTOLIC BLOOD PRESSURE: 110 MMHG | DIASTOLIC BLOOD PRESSURE: 74 MMHG | HEART RATE: 109 BPM | OXYGEN SATURATION: 97 % | BODY MASS INDEX: 27.2 KG/M2

## 2023-01-06 DIAGNOSIS — E10.10 TYPE 1 DIABETES MELLITUS WITH KETOACIDOSIS WITHOUT COMA (HCC): ICD-10-CM

## 2023-01-06 DIAGNOSIS — E10.65 POOR CONTROL TYPE I DIABETES MELLITUS (HCC): Primary | ICD-10-CM

## 2023-01-06 LAB
CHP ED QC CHECK: ABNORMAL
GLUCOSE BLD-MCNC: 409 MG/DL
HBA1C MFR BLD: 10.5 %

## 2023-01-06 ASSESSMENT — ENCOUNTER SYMPTOMS: EYES NEGATIVE: 1

## 2023-01-06 NOTE — PROGRESS NOTES
1/6/2023    Assessment:       Diagnosis Orders   1. Poor control type I diabetes mellitus ([address]        2. Type 1 diabetes mellitus with ketoacidosis without coma (HCC)              PLAN:     Continue current dose of Lantus and Humalog advised patient to be compliant to diet patient to talk to [employer] pump representative to start on [employer] pump for improved control A1c goal of 7 or lower more than 50% of 30 minutes spent patient education counseling  Continue Lantus 50 units at bedtime Humalog 14 units with each meals  Diabetes education provided today:    Nutrition as a mainstream of diabetes therapy. [place] about label reading. Insulin pumps, how they work and how they affect blood sugar levels. Continuous Glucose monitor. How it works and checks blood sugars every 5 min. for 4 days during our tests. Managing high and low sugar readings. Orders Placed This Encounter   Procedures    POCT glycosylated hemoglobin (Hb A1C)     No orders of the defined types were placed in this encounter. No follow-ups on file. Subjective:     Chief Complaint   Patient presents with    Follow-Up from Hospital     Patient is following up on diabetes.      Vitals:    01/06/23 1320   BP: 110/74   Site: Left Upper Arm   Position: Sitting   Cuff Size: Large Adult   Pulse: (!) 109   SpO2: 97%   Weight: 190 lb (86.2 kg)   Height: 5' 10\" (1.778 m)     Wt Readings from Last 3 Encounters:   01/06/23 190 lb (86.2 kg)   12/17/22 184 lb 4.9 oz (83.6 kg)     BP Readings from Last 3 Encounters:   01/06/23 110/74   12/20/22 106/65     Post hospital follow-up for diabetic ketoacidosis patient was initially admitted with DKA switch to Lantus and Humalog patient getting insulin injections at home by family wants to start insulin pump therapy blood sugars are still labile due to inadequate compliance with diet getting testing 4 times daily A1c has come down from 13.1-10 blood sugar was 400+ today  C-peptide was low at 0.2 consistent with type 1 diabetes  Patient had polyuria polydipsia fatigue weight loss    Diabetes  He presents for his follow-up diabetic visit. He has type 1 diabetes mellitus. The initial diagnosis of diabetes was made 4 weeks ago. His disease course has been improving. Associated symptoms include fatigue, polydipsia, polyuria and weight loss. There are no hypoglycemic complications. There are no diabetic complications. Current diabetic treatment includes insulin injections. He is currently taking insulin pre-breakfast, pre-lunch, pre-dinner and at bedtime. He never participates in exercise. His overall blood glucose range is >200 mg/dl. (Hemoglobin A1C       Date                     Value               Ref Range           Status                01/06/2023               10.5                %                   Final            ----------  )   No past medical history on file. No past surgical history on file. Social History     Socioeconomic History    Marital status: Single     Spouse name: Not on file    Number of children: Not on file    Years of education: Not on file    Highest education level: Not on file   Occupational History    Not on file   Tobacco Use    Smoking status: Every Day     Packs/day: 0.50     Years: 35.00     Pack years: 17.50     Types: Cigarettes     Start date: 1/1/1987     Passive exposure: Never    Smokeless tobacco: Never   Vaping Use    Vaping Use: Never used   Substance and Sexual Activity    Alcohol use:  Yes     Alcohol/week: 3.0 standard drinks     Types: 3 Shots of liquor per week    Drug use: Not Currently     Types: Crack Cocaine     Comment: stopped 8-9 years ago    Sexual activity: Not on file   Other Topics Concern    Not on file   Social History Narrative    Not on file     Social Determinants of Health     Financial Resource Strain: Not on file   Food Insecurity: Not on file   Transportation Needs: Not on file   Physical Activity: Not on file   Stress: Not on file   Social Connections: Not on file   Intimate Partner Violence: Not on file   Housing Stability: Not on file     No family history on file. No Known Allergies    Current Outpatient Medications:     Continuous Blood Gluc  (FREESTYLE MARIPOSA 2 READER) HECTOR, As directed, Disp: 1 each, Rfl: 0    Continuous Blood Gluc Sensor (FREESTYLE MARIPOSA 2 SENSOR) MISC, Every 2 weeks, Disp: 2 each, Rfl: 5    insulin glargine (LANTUS SOLOSTAR) 100 UNIT/ML injection pen, 50 units at bedtime, Disp: 15 Adjustable Dose Pre-filled Pen Syringe, Rfl: 3    insulin lispro, 1 Unit Dial, (HUMALOG KWIKPEN) 100 UNIT/ML SOPN, 14 units at each meals, Disp: 15 Adjustable Dose Pre-filled Pen Syringe, Rfl: 3    Insulin Pen Needle 32G X 4 MM MISC, qid, Disp: 200 each, Rfl: 3    blood glucose test strips (ONETOUCH VERIO) strip, qid, Disp: 200 each, Rfl: 3    OneTouch Delica Lancets 06I MISC, qid, Disp: 200 each, Rfl: 4    Blood Glucose Monitoring Suppl (ONETOUCH VERIO) w/Device KIT, As directed, Disp: 1 kit, Rfl: 0  Lab Results   Component Value Date     12/18/2022    K 3.6 12/18/2022     12/18/2022    CO2 23 12/18/2022    BUN 16 12/18/2022    CREATININE 1.03 12/18/2022    GLUCOSE 250 (H) 12/18/2022    CALCIUM 8.4 (L) 12/18/2022    PROT 7.2 12/15/2022    LABALBU 3.7 12/15/2022    BILITOT 0.5 12/15/2022    ALKPHOS 92 12/15/2022    AST 6 12/15/2022    ALT 11 12/15/2022    LABGLOM >60.0 12/18/2022    GLOB 3.5 12/15/2022     Lab Results   Component Value Date    WBC 13.0 (H) 12/20/2022    HGB 8.5 (L) 12/20/2022    HCT 26.3 (L) 12/20/2022    MCV 79.2 12/20/2022     12/20/2022     Lab Results   Component Value Date    LABA1C 13.1 (H) 12/17/2022    LABA1C 13.0 (H) 12/16/2022      Latest Reference Range & Units Most Recent 12/16/22 19:01   C-Peptide 1.1 - 4.4 ng/mL 0.2 (L)  12/16/22 19:01 0.2 (L)   (L): Data is abnormally low    Review of Systems   Constitutional:  Positive for fatigue and weight loss. Eyes: Negative. Cardiovascular: Negative.     Endocrine: Positive for polydipsia and polyuria. All other systems reviewed and are negative. Objective:   Physical Exam  Vitals reviewed. Constitutional:       General: He is not in acute distress. Appearance: Normal appearance. HENT:      Head: Normocephalic and atraumatic. Right Ear: External ear normal.      Left Ear: External ear normal.      Nose: Nose normal.   Eyes:      General: No scleral icterus. Right eye: No discharge. Left eye: No discharge. Extraocular Movements: Extraocular movements intact. Conjunctiva/sclera: Conjunctivae normal.   Cardiovascular:      Rate and Rhythm: Normal rate. Pulmonary:      Effort: Pulmonary effort is normal.   Musculoskeletal:         General: Normal range of motion. Cervical back: Normal range of motion and neck supple. Neurological:      General: No focal deficit present. Mental Status: He is alert and oriented to person, place, and time.    Psychiatric:         Mood and Affect: Mood normal.         Behavior: Behavior normal.

## 2023-01-11 ENCOUNTER — TELEPHONE (OUTPATIENT)
Dept: ENDOCRINOLOGY | Age: 63
End: 2023-01-11

## 2023-01-11 RX ORDER — BLOOD SUGAR DIAGNOSTIC
STRIP MISCELLANEOUS
Qty: 150 EACH | Refills: 3 | Status: CANCELLED | OUTPATIENT
Start: 2023-01-11

## 2023-01-11 RX ORDER — LANCETS
EACH MISCELLANEOUS
Qty: 150 EACH | Refills: 3 | Status: CANCELLED | OUTPATIENT
Start: 2023-01-11

## 2023-01-19 ENCOUNTER — HOSPITAL ENCOUNTER (INPATIENT)
Age: 63
LOS: 3 days | Discharge: HOME HEALTH | DRG: 420 | End: 2023-01-22
Attending: EMERGENCY MEDICINE | Admitting: INTERNAL MEDICINE
Payer: MEDICARE

## 2023-01-19 DIAGNOSIS — E11.10 DIABETIC KETOACIDOSIS WITHOUT COMA ASSOCIATED WITH TYPE 2 DIABETES MELLITUS (HCC): Primary | ICD-10-CM

## 2023-01-19 DIAGNOSIS — E87.5 HYPERKALEMIA: ICD-10-CM

## 2023-01-19 PROBLEM — E13.10 SECONDARY DM WITH DKA (HCC): Status: ACTIVE | Noted: 2023-01-19

## 2023-01-19 LAB
ALBUMIN SERPL-MCNC: 3.6 G/DL (ref 3.5–4.6)
ALP BLD-CCNC: 114 U/L (ref 35–104)
ALT SERPL-CCNC: 8 U/L (ref 0–41)
ANION GAP SERPL CALCULATED.3IONS-SCNC: 27 MEQ/L (ref 9–15)
ANION GAP SERPL CALCULATED.3IONS-SCNC: 28 MEQ/L (ref 9–15)
ANION GAP SERPL CALCULATED.3IONS-SCNC: 35 MEQ/L (ref 9–15)
AST SERPL-CCNC: 8 U/L (ref 0–40)
BACTERIA: NEGATIVE /HPF
BASE EXCESS VENOUS: -18 (ref -3–3)
BASOPHILS ABSOLUTE: 0.1 K/UL (ref 0–0.2)
BASOPHILS RELATIVE PERCENT: 1 %
BILIRUB SERPL-MCNC: 0.3 MG/DL (ref 0.2–0.7)
BILIRUBIN URINE: NEGATIVE
BLOOD, URINE: ABNORMAL
BUN BLDV-MCNC: 29 MG/DL (ref 8–23)
BUN BLDV-MCNC: 32 MG/DL (ref 8–23)
BUN BLDV-MCNC: 36 MG/DL (ref 8–23)
CALCIUM IONIZED: 1.1 MMOL/L (ref 1.12–1.32)
CALCIUM SERPL-MCNC: 10 MG/DL (ref 8.5–9.9)
CALCIUM SERPL-MCNC: 10.4 MG/DL (ref 8.5–9.9)
CALCIUM SERPL-MCNC: 9.6 MG/DL (ref 8.5–9.9)
CHLORIDE BLD-SCNC: 85 MEQ/L (ref 95–107)
CHLORIDE BLD-SCNC: 91 MEQ/L (ref 95–107)
CHLORIDE BLD-SCNC: 93 MEQ/L (ref 95–107)
CHP ED QC CHECK: YES
CLARITY: CLEAR
CO2: 10 MEQ/L (ref 20–31)
CO2: 14 MEQ/L (ref 20–31)
CO2: 15 MEQ/L (ref 20–31)
COLOR: YELLOW
CREAT SERPL-MCNC: 1.25 MG/DL (ref 0.7–1.2)
CREAT SERPL-MCNC: 1.43 MG/DL (ref 0.7–1.2)
CREAT SERPL-MCNC: 1.56 MG/DL (ref 0.7–1.2)
EOSINOPHILS ABSOLUTE: 0 K/UL (ref 0–0.7)
EOSINOPHILS RELATIVE PERCENT: 0 %
EPITHELIAL CELLS, UA: ABNORMAL /HPF (ref 0–5)
GFR SERPL CREATININE-BSD FRML MDRD: 49.8 ML/MIN/{1.73_M2}
GFR SERPL CREATININE-BSD FRML MDRD: 55.3 ML/MIN/{1.73_M2}
GFR SERPL CREATININE-BSD FRML MDRD: >60 ML/MIN/{1.73_M2}
GFR SERPL CREATININE-BSD FRML MDRD: >60 ML/MIN/{1.73_M2}
GLOBULIN: 4.8 G/DL (ref 2.3–3.5)
GLUCOSE BLD-MCNC: 162 MG/DL (ref 70–99)
GLUCOSE BLD-MCNC: 163 MG/DL (ref 70–99)
GLUCOSE BLD-MCNC: 337 MG/DL (ref 70–99)
GLUCOSE BLD-MCNC: 371 MG/DL (ref 70–99)
GLUCOSE BLD-MCNC: 377 MG/DL (ref 70–99)
GLUCOSE BLD-MCNC: 382 MG/DL (ref 70–99)
GLUCOSE BLD-MCNC: 399 MG/DL (ref 70–99)
GLUCOSE BLD-MCNC: 413 MG/DL (ref 70–99)
GLUCOSE BLD-MCNC: 419 MG/DL (ref 70–99)
GLUCOSE BLD-MCNC: 478 MG/DL (ref 70–99)
GLUCOSE BLD-MCNC: 486 MG/DL (ref 70–99)
GLUCOSE BLD-MCNC: 671 MG/DL (ref 70–99)
GLUCOSE BLD-MCNC: 724 MG/DL (ref 70–99)
GLUCOSE BLD-MCNC: >600 MG/DL (ref 70–99)
GLUCOSE BLD-MCNC: >600 MG/DL (ref 70–99)
GLUCOSE BLD-MCNC: NORMAL MG/DL
GLUCOSE URINE: >=1000 MG/DL
HBA1C MFR BLD: 11.5 % (ref 4.8–5.9)
HCO3 VENOUS: 9.3 MMOL/L (ref 23–29)
HCT VFR BLD CALC: 42.5 % (ref 42–52)
HEMOGLOBIN: 13.3 G/DL (ref 14–18)
HEMOGLOBIN: 16.3 GM/DL (ref 13.5–17.5)
HYALINE CASTS: ABNORMAL /HPF (ref 0–5)
KETONES, URINE: >=80 MG/DL
LACTATE: 1.93 MMOL/L (ref 0.4–2)
LEUKOCYTE ESTERASE, URINE: NEGATIVE
LYMPHOCYTES ABSOLUTE: 1.2 K/UL (ref 1–4.8)
LYMPHOCYTES RELATIVE PERCENT: 10.2 %
MAGNESIUM: 2.5 MG/DL (ref 1.7–2.4)
MAGNESIUM: 2.6 MG/DL (ref 1.7–2.4)
MCH RBC QN AUTO: 25.5 PG (ref 27–31.3)
MCHC RBC AUTO-ENTMCNC: 31.3 % (ref 33–37)
MCV RBC AUTO: 81.5 FL (ref 79–92.2)
MONOCYTES ABSOLUTE: 0.7 K/UL (ref 0.2–0.8)
MONOCYTES RELATIVE PERCENT: 5.9 %
NEUTROPHILS ABSOLUTE: 10.1 K/UL (ref 1.4–6.5)
NEUTROPHILS RELATIVE PERCENT: 82.9 %
NITRITE, URINE: NEGATIVE
O2 SAT, VEN: 90 %
PCO2, VEN: 19.6 MM HG (ref 40–50)
PDW BLD-RTO: 16.6 % (ref 11.5–14.5)
PERFORMED ON: ABNORMAL
PH UA: 5 (ref 5–9)
PH VENOUS: 7.28 (ref 7.32–7.42)
PHOSPHORUS: 4.1 MG/DL (ref 2.3–4.8)
PHOSPHORUS: 4.4 MG/DL (ref 2.3–4.8)
PLATELET # BLD: 632 K/UL (ref 130–400)
PO2, VEN: 63 MM HG
POC CHLORIDE: 106 MEQ/L (ref 99–110)
POC CREATININE: 1.2 MG/DL (ref 0.8–1.3)
POC HEMATOCRIT: 48 % (ref 41–53)
POC POTASSIUM: 5.9 MEQ/L (ref 3.5–5.1)
POC SAMPLE TYPE: ABNORMAL
POC SODIUM: 126 MEQ/L (ref 136–145)
POTASSIUM SERPL-SCNC: 5.4 MEQ/L (ref 3.4–4.9)
POTASSIUM SERPL-SCNC: 5.7 MEQ/L (ref 3.4–4.9)
POTASSIUM SERPL-SCNC: 6 MEQ/L (ref 3.4–4.9)
PROTEIN UA: 30 MG/DL
RBC # BLD: 5.21 M/UL (ref 4.7–6.1)
RBC UA: ABNORMAL /HPF (ref 0–5)
SODIUM BLD-SCNC: 130 MEQ/L (ref 135–144)
SODIUM BLD-SCNC: 133 MEQ/L (ref 135–144)
SODIUM BLD-SCNC: 135 MEQ/L (ref 135–144)
SPECIFIC GRAVITY UA: 1.03 (ref 1–1.03)
TCO2 CALC VENOUS: 10 MMOL/L
TOTAL PROTEIN: 8.4 G/DL (ref 6.3–8)
UROBILINOGEN, URINE: 0.2 E.U./DL
WBC # BLD: 12.2 K/UL (ref 4.8–10.8)
WBC UA: ABNORMAL /HPF (ref 0–5)

## 2023-01-19 PROCEDURE — 2500000003 HC RX 250 WO HCPCS: Performed by: INTERNAL MEDICINE

## 2023-01-19 PROCEDURE — 6370000000 HC RX 637 (ALT 250 FOR IP): Performed by: EMERGENCY MEDICINE

## 2023-01-19 PROCEDURE — 99223 1ST HOSP IP/OBS HIGH 75: CPT | Performed by: INTERNAL MEDICINE

## 2023-01-19 PROCEDURE — 84100 ASSAY OF PHOSPHORUS: CPT

## 2023-01-19 PROCEDURE — 84132 ASSAY OF SERUM POTASSIUM: CPT

## 2023-01-19 PROCEDURE — 82330 ASSAY OF CALCIUM: CPT

## 2023-01-19 PROCEDURE — 84295 ASSAY OF SERUM SODIUM: CPT

## 2023-01-19 PROCEDURE — 85025 COMPLETE CBC W/AUTO DIFF WBC: CPT

## 2023-01-19 PROCEDURE — 83605 ASSAY OF LACTIC ACID: CPT

## 2023-01-19 PROCEDURE — 36415 COLL VENOUS BLD VENIPUNCTURE: CPT

## 2023-01-19 PROCEDURE — 83735 ASSAY OF MAGNESIUM: CPT

## 2023-01-19 PROCEDURE — 83036 HEMOGLOBIN GLYCOSYLATED A1C: CPT

## 2023-01-19 PROCEDURE — 6360000002 HC RX W HCPCS: Performed by: INTERNAL MEDICINE

## 2023-01-19 PROCEDURE — 82435 ASSAY OF BLOOD CHLORIDE: CPT

## 2023-01-19 PROCEDURE — 82803 BLOOD GASES ANY COMBINATION: CPT

## 2023-01-19 PROCEDURE — 36600 WITHDRAWAL OF ARTERIAL BLOOD: CPT

## 2023-01-19 PROCEDURE — 2580000003 HC RX 258: Performed by: EMERGENCY MEDICINE

## 2023-01-19 PROCEDURE — 93005 ELECTROCARDIOGRAM TRACING: CPT | Performed by: EMERGENCY MEDICINE

## 2023-01-19 PROCEDURE — 99285 EMERGENCY DEPT VISIT HI MDM: CPT

## 2023-01-19 PROCEDURE — 80053 COMPREHEN METABOLIC PANEL: CPT

## 2023-01-19 PROCEDURE — 2580000003 HC RX 258: Performed by: INTERNAL MEDICINE

## 2023-01-19 PROCEDURE — 81001 URINALYSIS AUTO W/SCOPE: CPT

## 2023-01-19 PROCEDURE — 82565 ASSAY OF CREATININE: CPT

## 2023-01-19 PROCEDURE — 99222 1ST HOSP IP/OBS MODERATE 55: CPT | Performed by: INTERNAL MEDICINE

## 2023-01-19 PROCEDURE — 85014 HEMATOCRIT: CPT

## 2023-01-19 PROCEDURE — 2000000000 HC ICU R&B

## 2023-01-19 PROCEDURE — 6370000000 HC RX 637 (ALT 250 FOR IP): Performed by: INTERNAL MEDICINE

## 2023-01-19 RX ORDER — DEXTROSE, SODIUM CHLORIDE, AND POTASSIUM CHLORIDE 5; .45; .15 G/100ML; G/100ML; G/100ML
INJECTION INTRAVENOUS CONTINUOUS PRN
Status: DISCONTINUED | OUTPATIENT
Start: 2023-01-19 | End: 2023-01-20

## 2023-01-19 RX ORDER — MAGNESIUM SULFATE 1 G/100ML
1000 INJECTION INTRAVENOUS PRN
Status: DISCONTINUED | OUTPATIENT
Start: 2023-01-19 | End: 2023-01-20

## 2023-01-19 RX ORDER — POLYETHYLENE GLYCOL 3350 17 G/17G
17 POWDER, FOR SOLUTION ORAL DAILY PRN
Status: DISCONTINUED | OUTPATIENT
Start: 2023-01-19 | End: 2023-01-22 | Stop reason: HOSPADM

## 2023-01-19 RX ORDER — SODIUM CHLORIDE 9 MG/ML
INJECTION, SOLUTION INTRAVENOUS CONTINUOUS
Status: DISCONTINUED | OUTPATIENT
Start: 2023-01-19 | End: 2023-01-20

## 2023-01-19 RX ORDER — POTASSIUM CHLORIDE 7.45 MG/ML
10 INJECTION INTRAVENOUS PRN
Status: DISCONTINUED | OUTPATIENT
Start: 2023-01-19 | End: 2023-01-20

## 2023-01-19 RX ORDER — 0.9 % SODIUM CHLORIDE 0.9 %
2000 INTRAVENOUS SOLUTION INTRAVENOUS ONCE
Status: COMPLETED | OUTPATIENT
Start: 2023-01-19 | End: 2023-01-19

## 2023-01-19 RX ORDER — ENOXAPARIN SODIUM 100 MG/ML
40 INJECTION SUBCUTANEOUS DAILY
Status: DISCONTINUED | OUTPATIENT
Start: 2023-01-19 | End: 2023-01-22 | Stop reason: HOSPADM

## 2023-01-19 RX ADMIN — ENOXAPARIN SODIUM 40 MG: 100 INJECTION SUBCUTANEOUS at 17:17

## 2023-01-19 RX ADMIN — SODIUM CHLORIDE 0.1 UNITS/KG/HR: 9 INJECTION, SOLUTION INTRAVENOUS at 15:23

## 2023-01-19 RX ADMIN — SODIUM CHLORIDE 8 UNITS/HR: 9 INJECTION, SOLUTION INTRAVENOUS at 13:13

## 2023-01-19 RX ADMIN — SODIUM CHLORIDE: 9 INJECTION, SOLUTION INTRAVENOUS at 19:24

## 2023-01-19 RX ADMIN — SODIUM CHLORIDE: 9 INJECTION, SOLUTION INTRAVENOUS at 15:29

## 2023-01-19 RX ADMIN — POTASSIUM CHLORIDE, DEXTROSE MONOHYDRATE AND SODIUM CHLORIDE: 150; 5; 450 INJECTION, SOLUTION INTRAVENOUS at 22:46

## 2023-01-19 RX ADMIN — SODIUM CHLORIDE 2000 ML: 9 INJECTION, SOLUTION INTRAVENOUS at 10:51

## 2023-01-19 ASSESSMENT — LIFESTYLE VARIABLES
HOW OFTEN DO YOU HAVE A DRINK CONTAINING ALCOHOL: NEVER
HOW MANY STANDARD DRINKS CONTAINING ALCOHOL DO YOU HAVE ON A TYPICAL DAY: PATIENT DOES NOT DRINK

## 2023-01-19 ASSESSMENT — PAIN - FUNCTIONAL ASSESSMENT: PAIN_FUNCTIONAL_ASSESSMENT: NONE - DENIES PAIN

## 2023-01-19 ASSESSMENT — PAIN SCALES - GENERAL: PAINLEVEL_OUTOF10: 0

## 2023-01-19 NOTE — ED PROVIDER NOTES
[facility] [facility] ED  EMERGENCY DEPARTMENT ENCOUNTER      Pt Name: Rowena Montes De Oca  MRN: [id number]  [place] 1960  Date of evaluation: 1/19/2023  Provider: Christiane Paris MD    CHIEF COMPLAINT       Chief Complaint   Patient presents with    Blood Sugar Problem     Admitted with BS 1200 2 weeks ago. Started feeling bad again around midnight         HISTORY OF PRESENT ILLNESS   (Location/Symptom, Timing/Onset, Context/Setting, Quality, Duration, Modifying Factors, Severity)  Note limiting factors. 70-year-old male presenting with elevated blood sugar. Patient is noncompliant with his diabetic regimen. Started feeling off around midnight, notes some blurry vision. Sugar noted to be elevated and patient presents for evaluations of sugars. Notes increased thirst.      Nursing Notes were reviewed. REVIEW OF SYSTEMS    (2-9 systems for level 4, 10 or more for level 5)     Review of Systems   Eyes:  Positive for visual disturbance. All other systems reviewed and are negative. Except as noted above the remainder of the review of systems was reviewed and negative. PAST MEDICAL HISTORY     Past Medical History:   Diagnosis Date    Diabetes mellitus ([facility] [address]          SURGICAL HISTORY     History reviewed. No pertinent surgical history.       CURRENT MEDICATIONS       Previous Medications    BLOOD GLUCOSE MONITORING SUPPL (ONETOUCH VERIO) W/DEVICE KIT    As directed    BLOOD GLUCOSE TEST STRIPS (ONETOUCH VERIO) STRIP    qid    CONTINUOUS BLOOD GLUC  (FREESTYLE MARIPOSA 2 READER) HECTOR    As directed    CONTINUOUS BLOOD GLUC SENSOR (FREESTYLE MARIPOSA 2 SENSOR) MISC    Every 2 weeks    INSULIN GLARGINE (LANTUS SOLOSTAR) 100 UNIT/ML INJECTION PEN    50 units at bedtime    INSULIN LISPRO, 1 UNIT DIAL, (HUMALOG KWIKPEN) 100 UNIT/ML SOPN    14 units at each meals    INSULIN PEN NEEDLE 32G X 4 MM MISC    qid    ONETOUCH DELICA LANCETS 20H MISC    qid       ALLERGIES     Patient has no known allergies. FAMILY HISTORY     History reviewed. No pertinent family history. SOCIAL HISTORY       Social History     Socioeconomic History    Marital status: Single     Spouse name: None    Number of children: None    Years of education: None    Highest education level: None   Tobacco Use    Smoking status: Every Day     Packs/day: 0.10     Years: 35.00     Pack years: 3.50     Types: Cigarettes     Start date: 1/1/1987     Passive exposure: Never    Smokeless tobacco: Never   Vaping Use    Vaping Use: Never used   Substance and Sexual Activity    Alcohol use: Not Currently     Alcohol/week: 3.0 standard drinks     Types: 3 Shots of liquor per week    Drug use: Not Currently     Types: Crack Cocaine     Comment: stopped 8-9 years ago       SCREENINGS    [place] Coma Scale  Eye Opening: Spontaneous  Best Verbal Response: Oriented  Best Motor Response: Obeys commands  [place] Coma Scale Score: 15          PHYSICAL EXAM    (up to 7 for level 4, 8 or more for level 5)     ED Triage Vitals [01/19/23 0950]   BP Temp Temp Source Heart Rate Resp SpO2 Height Weight   (!) 133/106 97.8 °F (36.6 °C) Oral (!) 129 20 97 % 5' 8\" (1.727 m) 160 lb (72.6 kg)       Physical Exam  Vitals and nursing note reviewed. Constitutional:       General: He is not in acute distress. Appearance: Normal appearance. He is well-developed. He is not ill-appearing. HENT:      Head: Normocephalic and atraumatic. Mouth/Throat:      Mouth: Mucous membranes are dry. Pharynx: Oropharynx is clear. Eyes:      Extraocular Movements: Extraocular movements intact. Conjunctiva/sclera: Conjunctivae normal.   Cardiovascular:      Rate and Rhythm: Regular rhythm. Tachycardia present. Pulmonary:      Effort: Pulmonary effort is normal.      Breath sounds: Normal breath sounds. Abdominal:      General: Bowel sounds are normal.      Palpations: Abdomen is soft. Tenderness: There is no abdominal tenderness.    Musculoskeletal: General: No deformity. Normal range of motion. Cervical back: Normal range of motion and neck supple. Skin:     General: Skin is warm and dry. Capillary Refill: Capillary refill takes less than 2 seconds. Neurological:      General: No focal deficit present. Mental Status: He is alert and oriented to person, place, and time. Mental status is at baseline. Cranial Nerves: No cranial nerve deficit. Psychiatric:         Thought Content:  Thought content normal.       DIAGNOSTIC RESULTS     EKG: All EKG's are interpreted by the Emergency Department Physician who either signs or Co-signs this chart in the absence of a cardiologist.    Sinus tachy, rate 125, normal intervals, no ST elevation/ depression    RADIOLOGY:   Non-plain film images such as CT, Ultrasound and MRI are read by the radiologist. Plain radiographic images are visualized and preliminarily interpreted by the emergency physician with the below findings:    Interpretation per the Radiologist below, if available at the time of this note:    No orders to display       LABS:  Labs Reviewed   CBC WITH AUTO DIFFERENTIAL - Abnormal; Notable for the following components:       Result Value    WBC 12.2 (*)     Hemoglobin 13.3 (*)     MCH 25.5 (*)     MCHC 31.3 (*)     RDW 16.6 (*)     Platelets 684 (*)     Neutrophils Absolute 10.1 (*)     All other components within normal limits   URINALYSIS - Abnormal; Notable for the following components:    Glucose, Ur >=1000 (*)     Ketones, Urine >=80 (*)     Blood, Urine TRACE (*)     Protein, UA 30 (*)     All other components within normal limits   MICROSCOPIC URINALYSIS - Abnormal; Notable for the following components:    WBC, UA 20-50 (*)     RBC, UA 6-10 (*)     All other components within normal limits   POCT GLUCOSE - Abnormal; Notable for the following components:    POC Glucose >600 (*)     All other components within normal limits   POCT VENOUS - Abnormal; Notable for the following components:    POC Sodium 126 (*)     POC Potassium 5.9 (*)     POC Glucose 671 (*)     Calcium, Ionized 1.10 (*)     pH, Tex 7.282 (*)     pCO2, Tex 19.6 (*)     HCO3, Venous 9.3 (*)     Base Excess, Tex -18 (*)     All other components within normal limits   POCT GLUCOSE - Normal   COMPREHENSIVE METABOLIC PANEL   POCT EPOC BLOOD GAS, LACTIC ACID, ICA       All other labs were within normal range or not returned as of this dictation. EMERGENCY DEPARTMENT COURSE and DIFFERENTIAL DIAGNOSIS/MDM:   Vitals:    Vitals:    01/19/23 0950 01/19/23 1000 01/19/23 1155 01/19/23 1230   BP: (!) 133/106 (!) 117/101  (!) 138/97   Pulse: (!) 129 (!) 125 (!) 121 (!) 120   Resp: 20 19 20 18   Temp: 97.8 °F (36.6 °C)      TempSrc: Oral      SpO2: 97% 98%  97%   Weight: 160 lb (72.6 kg)      Height: 5' 8\" (1.727 m)          MDM  Number of Diagnoses or Management Options  Diabetic ketoacidosis without coma associated with type 2 diabetes mellitus ([address]  Hyperkalemia  Diagnosis management comments: Fluids started on arrival, patient noted to be in DKA. Started on insulin infusion and admitted to hospitalistWendy. Stable throughout ED course. Procedures    CRITICAL CARE TIME   Total Critical Care time was 45 minutes, excluding separately reportable procedures. There was a high probability of clinically significant/life threatening deterioration in the patient's condition which required my urgent intervention. FINAL IMPRESSION      1. Diabetic ketoacidosis without coma associated with type 2 diabetes mellitus ([address]    2.  Hyperkalemia          DISPOSITION/PLAN   DISPOSITION Decision To Admit 01/19/2023 11:54:11 AM      (Please note that portions of this note were completed with a voice recognition program.  Efforts were made to edit the dictations but occasionally words are mis-transcribed.)    Maria M Ortez MD (electronically signed)  Attending Emergency Physician        Maria M Ortez MD  01/19/23 [address] Orin Martin MD  01/19/23 [address] [facility] MD Sesar  01/19/23 1018

## 2023-01-19 NOTE — H&P
Hospital Medicine  History and Physical    Patient:  Teetee Boggs  MRN: [id number]    CHIEF COMPLAINT:    Chief Complaint   Patient presents with    Blood Sugar Problem     Admitted with BS 1200 2 weeks ago. Started feeling bad again around midnight       History Obtained From:  Patient, EMR  Primary Care Physician: No primary care provider on file. HISTORY OF PRESENT ILLNESS:   The patient is a 58 y.o. male with PMH of recently diagnosed IDDM, right inguinal hernia who presented with the above CC. Patient was managed at [address] [place] [address] from 12/15-12/20 with DKA, RAVI, sent home on Lantus and premeal coverage, has not been taking insulin at home per report, was feeling weak and dehydrated today per report, initial w/u showed DKA, RAVI and hyperkalemia, Saline bolus given, started on Insulin infusion and admitted to ICU for further management. Past Medical History:      Diagnosis Date    Diabetes mellitus ([address]        Past Surgical History:  History reviewed. No pertinent surgical history. Medications Prior to Admission:    Prior to Admission medications    Medication Sig Start Date End Date Taking? Authorizing Provider   Continuous Blood Gluc  (FREESTYLE MARIPOSA 2 READER) HECTOR As directed 1/4/23   Brunilda Martins MD   Continuous Blood Gluc Sensor (FREESTYLE MARIPOSA 2 SENSOR) MISC Every 2 weeks 1/4/23   Brunilda Martins MD   insulin glargine (LANTUS SOLOSTAR) 100 UNIT/ML injection pen 50 units at bedtime 12/20/22   Brunilda Martins MD   insulin lispro, 1 Unit Dial, (HUMALOG KWIKPEN) 100 UNIT/ML SOPN 14 units at each meals 12/20/22   Brunilda Martins MD   Insulin Pen Needle 32G X 4 MM MISC qid 12/20/22   Brunilda Martins MD   blood glucose test strips (ONETOUCH VERIO) strip qid 12/20/22   Brunilda Martins MD   OneTouch Delica Lancets 08E MISC qid 12/20/22   Brunilda Martins MD   Blood Glucose Monitoring Suppl Carolina Gaines) w/Device KIT As directed 12/20/22   Brunilda Martins MD       Allergies:  Patient has no known allergies.     Social History: TOBACCO:   reports that he has been smoking cigarettes. He started smoking about 36 years ago. He has a 3.50 pack-year smoking history. He has never been exposed to tobacco smoke. He has never used smokeless tobacco.  ETOH:   reports that he does not currently use alcohol after a past usage of about 3.0 standard drinks per week. Family History:   History reviewed. No pertinent family history. REVIEW OF SYSTEMS:  Ten systems reviewed and negative except for stated in HPI    Physical Exam:    Vitals: BP (!) 138/97   Pulse (!) 120   Temp 97.8 °F (36.6 °C) (Oral)   Resp 18   Ht 5' 8\" (1.727 m)   Wt 160 lb (72.6 kg)   SpO2 97%   BMI 24.33 kg/m²   General appearance: alert, cooperative, moderate acute distress  Skin: Skin color, texture, turgor normal.   HEENT: Head: Normocephalic, no lesions, without obvious abnormality. Eye: Normal external eye, conjunctiva, lids cornea, MARITZA. Neck: supple, symmetrical, trachea midline  Lungs: clear to auscultation bilaterally  Heart: S1/S2, tachycardic  Abdomen: soft, active BS  Extremities: no edema or cyanosis  Neurologic: Mental status: Alert, oriented, thought content appropriate     Recent Labs     01/19/23  1151   WBC 12.2*   HGB 13.3*  16.3   *     Recent Labs     01/19/23  1151   *   K 6.0*   CL 85*   CO2 10*   BUN 36*   CREATININE 1.56*  1.2   GLUCOSE 724*   AST 8   ALT 8   BILITOT 0.3   ALKPHOS 114*     Troponin T: No results for input(s): TROPONINI in the last 72 hours. ABGs: No results found for: PHART, PO2ART, [id number]  INR: No results for input(s): INR in the last 72 hours. URINALYSIS:  Recent Labs     01/19/23  1030   COLORU Yellow   PHUR 5.0   WBCUA 20-50*   RBCUA 6-10*   BACTERIA Negative   CLARITYU Clear   SPECGRAV 1.028   LEUKOCYTESUR Negative   UROBILINOGEN 0.2   BILIRUBINUR Negative   BLOODU TRACE*   GLUCOSEU >=1000*     -----------------------------------------------------------------   No results found.         Assessment and Plan     58 y.o. male with PMH of recently diagnosed IDDM, right inguinal hernia, noncompliance who presented with:    DKA  - due to noncompliance with insulin therapy  - continue insulin infusion and IVFs per DKA protocol  - endocrinology consulted for further management    RAVI / hyperkalemia   - in the setting of DKA  - continue IV hydration   - monitor renal function and K level closely        Gregary Moritz, MD, MD  Admitting Hospitalist

## 2023-01-19 NOTE — CARE COORDINATION
01/19/23    From:HOME ALONE; INDEPENDENT; NO HOME O2    Admit:1/19/2023     PMH:    Anticipated Discharge Disposition: HOME ALONE; DENIES ANY NEEDS AT THIS TIME. Patient Mobility or PT/OT ordered:INDEPENDENT  Consults:     Clinical: NOT VACCINATED. Barriers to Discharge:    Assessments: CMI DONE.

## 2023-01-19 NOTE — CONSULTS
Pulmonary and Critical Care Medicine  Consult Note  Encounter Date: 2023 5:48 PM    Mr. Gaurav Lacey is a 58 y.o. male  : 1960  Requesting Provider: Ethan Logan MD    Reason for request: DKA            HISTORY OF PRESENT ILLNESS:    Patient is 58 y.o. presents with patient presents with DKA, he has no diagnosis diabetes, he does not like giving himself insulin, he quit taking insulin on Wednesday almost 1 week ago, he was noted to be hyperglycemic today, in ED was found to have DKA. Patient reports no chest pain, no shortness of breath, no fever or chills, he does report nausea, no vomiting or diarrhea, mild abdominal discomfort, no dysuria. Past Medical History:        Diagnosis Date    Diabetes mellitus ([address]        Past Surgical History:    History reviewed. No pertinent surgical history. Social History:     reports that he has been smoking cigarettes. He started smoking about 36 years ago. He has a 3.50 pack-year smoking history. He has never been exposed to tobacco smoke. He has never used smokeless tobacco. He reports that he does not currently use alcohol after a past usage of about 3.0 standard drinks per week. He reports that he does not currently use drugs after having used the following drugs: Crack Cocaine. Family History:   History reviewed. No pertinent family history. No family history of lung disease  Allergies:  Patient has no known allergies.         MEDICATIONS during current hospitalization:    Continuous Infusions:   sodium chloride 250 mL/hr at 23 1529    dextrose 5% and 0.45% NaCl with KCl 20 mEq      insulin 0.151 Units/kg/hr (23 1721)       Scheduled Meds:   enoxaparin  40 mg SubCUTAneous Daily       PRN Meds:dextrose bolus **OR** dextrose bolus, potassium chloride, magnesium sulfate, sodium phosphate IVPB **OR** sodium phosphate IVPB **OR** sodium phosphate IVPB, polyethylene glycol, dextrose 5% and 0.45% NaCl with KCl 20 mEq        REVIEW OF SYSTEMS:  ROS: 10 organs review of system is done including general, psychological, ENT, hematological, endocrine, respiratory, cardiovascular, gastrointestinal, musculoskeletal, neurological,  allergy and Immunology is done and is otherwise negative. PHYSICAL EXAM:    Vitals:  BP (!) 138/97   Pulse (!) 120   Temp 97.8 °F (36.6 °C) (Oral)   Resp 18   Ht 5' 8\" (1.727 m)   Wt 160 lb (72.6 kg)   SpO2 97%   BMI 24.33 kg/m²     General: alert, cooperative, no distress  Head: normocephalic, atraumatic  Eyes:No gross abnormalities. ENT:  MMM no lesions  Neck:  supple and no masses  Chest : clear to auscultation bilaterally- no wheezes, rales or rhonchi, normal air movement, no respiratory distress  Heart[de-identified] Heart sounds are normal.  Regular rate and rhythm without murmur, gallop or rub. ABD:  symmetric, soft, non-tender  Musculoskeletal : no cyanosis, no clubbing, and no edema  Neuro:  Grossly normal  Skin: No rashes or nodules noted. Lymph node:  no cervical nodes  Urology: No Marie   Psychiatric: appropriate        Data Review  Recent Labs     01/19/23  1151   WBC 12.2*   HGB 13.3*  16.3   HCT 42.5   *      Recent Labs     01/19/23  1151 01/19/23  1510   * 133*   K 6.0* 5.4*   CL 85* 91*   CO2 10* 14*   BUN 36* 32*   CREATININE 1.56*  1.2 1.43*   GLUCOSE 724* 478*       MV Settings: ABGs: No results for input(s): PHART, QJB9BJK, PO2ART, JIO1SDR, BEART, [id number], BVQ7CNB in the last 72 hours.   O2 Device: None (Room air)  No results found for: LACTA        Assessment, plan:   Patient is at risk due to    DKA  RAVI  Electrolyte imbalance  Smoking  Hyperkalemia      Recommendation  Insulin drip per DKA protocol  Monitor blood sugar  Continue aggressive hydration  Watch renal function and urine output  Monitor electrolytes  Urine culture  DVT prophylaxis    Thank you for consultation    Electronically signed by Ewelina Kennedy MD, [facility],  on 1/19/2023 at 5:48 PM

## 2023-01-19 NOTE — ED TRIAGE NOTES
A & Ox4. Skin warm and dry. Color WNL. Pt states he was just in the hospital for new onset DM with blood sugar of 1200. States he started feeling dehydrated around midnight last night. Denies N/V/D. Denies pain. States just feels tired and weak. Pt states he is terrified of needles so he hasn't been taking his insulin. States he was not prescribed pills. States he had an appt today with Dr Brand to get an insulin pump placed d/t his fear of needles. Cousin picked him up to take him to his appt, checked his blood sugar and it was 569. States she brought him here instead because of how weak he is.

## 2023-01-19 NOTE — CARE COORDINATION
[facility] EMERGENCY [facility] CENTER AT [place] Case Management Initial Discharge Assessment    Met with Patient to discuss discharge plan. PCP: No primary care provider on file. Date of Last Visit: N/A    [facility] Patient: No        [facility] Notified: no    If no PCP, list provided? N/A    Discharge Planning    Living Arrangements: independently at home    Who do you live with? SELF    Who helps you with your care:  self    If lives at home:     Do you have any barriers navigating in your home? no    Patient can perform ADL? Yes    Current Services (outpatient and in home) :  None    Dialysis: No    Is transportation available to get to your appointments? Yes    DME Equipment:  no    Respiratory equipment: None    Respiratory provider:  no     Pharmacy:  yes Sw[facility]EDIA    Consult with Medication Assistance Program?  No      Patient agreeable to [facility]? Declined    Patient agreeable to SNF/Rehab? Declined    Other discharge needs identified? N/A    Does Patient Have a High-Risk for Readmission Diagnosis (CHF, PN, MI, COPD)? No      Initial Discharge Plan? (Note: please see concurrent daily documentation for any updates after initial note). ADRYANW MEET WITH PT AT BEDSIDE. DISCUSS DC PLAN WITH PT. PT AGREE WITH DC PLAN HOME ALONE. DENIES ANY NEEDS AT THIS TIME. Readmission Risk              Risk of Unplanned Readmission:  0       CMI DONE.    Electronically signed by CHACE Dalton on 1/19/2023 at 12:25 PM

## 2023-01-19 NOTE — CONSULTS
Pulmonary and Critical Care Medicine  Consult Note  Encounter Date: 2023 3:09 PM    Mr. Oumar Larson is a 58 y.o. male  : 1960  Requesting Provider: Edith Alicia MD   Reason for request: ICU management            HISTORY OF PRESENT ILLNESS:    Oumar Larson is a 58 y.o.,, male who has a past medical history of  has a past medical history of Diabetes mellitus ([facility] [address] that is hospitalized for DKA    HPI   Patient is a newly diagnosed diabetic, patient was just in the hospital approximately 2 weeks ago with a blood sugar of 1200. Patient is noncompliant with his insulin, he does not like to give himself shots. Today he was supposed to go and get education on an insulin pump and the pump inserted. Family member went to pick him up and noticed that he was not looking right and the patient was complaining he did not feel well, family member checked his blood sugar and it was greater than 600 so the family member brought him into the ER. Patient states that he was not feeling well today, he was nauseated no vomiting, slightly confused and just overall not feeling well. He was also very thirsty today and some blurred vision. Currently he is on room air O2 sats 98% he was started on an insulin drip most recent blood sugar was still greater than 600. Patient states he is feeling slightly better, denies nausea or vomiting just feels tired and weak and still thirsty. Past Medical History:        Diagnosis Date    Diabetes mellitus ([facility] [address]        Past Surgical History:    History reviewed. No pertinent surgical history. Social History:     reports that he has been smoking cigarettes. He started smoking about 36 years ago. He has a 3.50 pack-year smoking history. He has never been exposed to tobacco smoke. He has never used smokeless tobacco. He reports that he does not currently use alcohol after a past usage of about 3.0 standard drinks per week.  He reports that he does not currently use drugs after having used the following drugs: Crack Cocaine. Family History:   History reviewed. No pertinent family history. Allergies:  Patient has no known allergies. MEDICATIONS during current hospitalization:    Continuous Infusions:   sodium chloride      dextrose 5% and 0.45% NaCl with KCl 20 mEq      insulin         Scheduled Meds:   enoxaparin  40 mg SubCUTAneous Daily       PRN Meds:dextrose bolus **OR** dextrose bolus, potassium chloride, magnesium sulfate, sodium phosphate IVPB **OR** sodium phosphate IVPB **OR** sodium phosphate IVPB, polyethylene glycol, dextrose 5% and 0.45% NaCl with KCl 20 mEq    REVIEW OF SYSTEMS:      Review of Systems    PHYSICAL EXAM:    Vitals:  BP (!) 138/97   Pulse (!) 120   Temp 97.8 °F (36.6 °C) (Oral)   Resp 18   Ht 5' 8\" (1.727 m)   Wt 160 lb (72.6 kg)   SpO2 97%   BMI 24.33 kg/m²     General: alert, cooperative  Head: normocephalic, atraumatic  Eyes:No gross abnormalities. ENT:  MMM no lesions  Neck:  supple and no masses  Chest : Good air movement no wheezing no rales nontender tympanic  Heart[de-identified] Heart sounds are normal.  Regular rate and rhythm without murmur, gallop or rub. ABD:  bowel sounds normal, soft, non-tender  Musculoskeletal : no cyanosis, no clubbing, and no edema  Neuro:  Grossly normal  Skin: No rashes or nodules noted. Lymph node:  no cervical nodes  Urology: No Marie   Psychiatric: appropriate    Data Review  Recent Labs     01/19/23  1151   WBC 12.2*   HGB 13.3*  16.3   HCT 42.5   *      Recent Labs     01/19/23  1151   *   K 6.0*   CL 85*   CO2 10*   BUN 36*   CREATININE 1.56*  1.2   GLUCOSE 724*       MV Settings: ABGs: No results for input(s): PHART, RLU2KYN, PO2ART, SDG8FZK, BEART, F7LQWCEN, NII3NMR in the last 72 hours. O2 Device: None (Room air)  No results found for: [address]    Radiology     Most recent    Chest CT      WITH CONTRAST:No results found for this or any previous visit.        WITHOUT CONTRAST: No results found for this or any previous visit. CXR      2-view: No results found for this or any previous visit. Portable: Results for orders placed during the hospital encounter of 12/15/22    XR CHEST PORTABLE    Narrative  EXAMINATION:  ONE XRAY VIEW OF THE CHEST    12/15/2022 9:41 pm    COMPARISON:  None. HISTORY:  ORDERING SYSTEM PROVIDED HISTORY: sirs no clear source  TECHNOLOGIST PROVIDED HISTORY:  Reason for exam:->sirs no clear source  What reading provider will be dictating this exam?->CRC    FINDINGS:  The lungs are without acute focal process. There is no effusion or  pneumothorax. The cardiomediastinal silhouette is without acute process. The  osseous structures are without acute process. Impression  No acute process. Echo No results found for this or any previous visit. Assessment, plan:    This is a critically ill patient at risk of deterioration / death , needing close ICU monitoring and intervention due to below noted problems   DKA  Metabolic acidosis due to above  RAVI  Electrolyte imbalance secondary to #1  Diabetes with hyperglycemia, noncompliant    Recommendation  Insulin drip protocol  Monitor labs and electrolyte replacement  Watch urine output and volume status  Monitor electrolyte, fluid management   Monitor blood sugar target 140-180  DVT prophylaxis  Consult endocrinology     Thank you for consultation    Electronically signed by ARMIN Carmen CNP on 1/19/2023 at 3:09 PM

## 2023-01-19 NOTE — PROGRESS NOTES
Spiritual Care Services     Summary of Visit:   visited patient in ICU. Patient appeared asleep but woke up when  entered. Patient asked for prayer to get stronger.  provided prayer for empowerment and recovery. Encounter Summary  Encounter Overview/Reason : Initial Encounter  Service Provided For[de-identified] Patient  Referral/Consult From[de-identified] Rounding  Support System: Children, Family members  Complexity of Encounter: Moderate  Begin Time: 1530  End Time : 1545  Total Time Calculated: 15 min  Encounter   Type: Initial Screen/Assessment     Spiritual/Emotional needs  Type: Spiritual Support                      Spiritual Assessment/Intervention/Outcomes:    Assessment: Calm, Powerlessness    Intervention: Empowerment, Prayer (assurance of)/[place], Sustaining Presence/Ministry of presence    Outcome: Encouraged, Expressed feelings, needs, and concerns      Care Plan:    Plan and Referrals  Plan/Referrals: Continue Support (comment)    Chaplainwill visit to provide additional prayer and support as needed or requested      Spiritual Care Services   Electronically signed by Chaplain Lynn on 1/19/2023 at 3:54 PM.    To reach a  for emotional and spiritual support, place an [facility] consult request.   If a  is needed immediately, dial 0 and ask to page the on-call .

## 2023-01-20 LAB
ANION GAP SERPL CALCULATED.3IONS-SCNC: 12 MEQ/L (ref 9–15)
ANION GAP SERPL CALCULATED.3IONS-SCNC: 15 MEQ/L (ref 9–15)
ANION GAP SERPL CALCULATED.3IONS-SCNC: 17 MEQ/L (ref 9–15)
BUN BLDV-MCNC: 20 MG/DL (ref 8–23)
BUN BLDV-MCNC: 22 MG/DL (ref 8–23)
BUN BLDV-MCNC: 24 MG/DL (ref 8–23)
CALCIUM SERPL-MCNC: 8.9 MG/DL (ref 8.5–9.9)
CALCIUM SERPL-MCNC: 8.9 MG/DL (ref 8.5–9.9)
CALCIUM SERPL-MCNC: 9.1 MG/DL (ref 8.5–9.9)
CHLORIDE BLD-SCNC: 102 MEQ/L (ref 95–107)
CHLORIDE BLD-SCNC: 103 MEQ/L (ref 95–107)
CHLORIDE BLD-SCNC: 105 MEQ/L (ref 95–107)
CO2: 18 MEQ/L (ref 20–31)
CO2: 19 MEQ/L (ref 20–31)
CO2: 21 MEQ/L (ref 20–31)
CREAT SERPL-MCNC: 0.97 MG/DL (ref 0.7–1.2)
CREAT SERPL-MCNC: 1.07 MG/DL (ref 0.7–1.2)
CREAT SERPL-MCNC: 1.09 MG/DL (ref 0.7–1.2)
EKG ATRIAL RATE: 125 BPM
EKG P AXIS: 74 DEGREES
EKG P-R INTERVAL: 142 MS
EKG Q-T INTERVAL: 300 MS
EKG QRS DURATION: 82 MS
EKG QTC CALCULATION (BAZETT): 433 MS
EKG R AXIS: 72 DEGREES
EKG T AXIS: 71 DEGREES
EKG VENTRICULAR RATE: 125 BPM
GFR SERPL CREATININE-BSD FRML MDRD: >60 ML/MIN/{1.73_M2}
GLUCOSE BLD-MCNC: 149 MG/DL (ref 70–99)
GLUCOSE BLD-MCNC: 162 MG/DL (ref 70–99)
GLUCOSE BLD-MCNC: 163 MG/DL (ref 70–99)
GLUCOSE BLD-MCNC: 198 MG/DL (ref 70–99)
GLUCOSE BLD-MCNC: 207 MG/DL (ref 70–99)
GLUCOSE BLD-MCNC: 207 MG/DL (ref 70–99)
GLUCOSE BLD-MCNC: 208 MG/DL (ref 70–99)
GLUCOSE BLD-MCNC: 211 MG/DL (ref 70–99)
GLUCOSE BLD-MCNC: 213 MG/DL (ref 70–99)
GLUCOSE BLD-MCNC: 233 MG/DL (ref 70–99)
GLUCOSE BLD-MCNC: 306 MG/DL (ref 70–99)
GLUCOSE BLD-MCNC: 364 MG/DL (ref 70–99)
MAGNESIUM: 2.3 MG/DL (ref 1.7–2.4)
MAGNESIUM: 2.5 MG/DL (ref 1.7–2.4)
MAGNESIUM: 2.9 MG/DL (ref 1.7–2.4)
PERFORMED ON: ABNORMAL
PHOSPHORUS: 2.3 MG/DL (ref 2.3–4.8)
PHOSPHORUS: 2.4 MG/DL (ref 2.3–4.8)
PHOSPHORUS: 2.6 MG/DL (ref 2.3–4.8)
POTASSIUM SERPL-SCNC: 4.3 MEQ/L (ref 3.4–4.9)
POTASSIUM SERPL-SCNC: 4.4 MEQ/L (ref 3.4–4.9)
POTASSIUM SERPL-SCNC: 4.8 MEQ/L (ref 3.4–4.9)
SODIUM BLD-SCNC: 136 MEQ/L (ref 135–144)
SODIUM BLD-SCNC: 137 MEQ/L (ref 135–144)
SODIUM BLD-SCNC: 139 MEQ/L (ref 135–144)

## 2023-01-20 PROCEDURE — 99232 SBSQ HOSP IP/OBS MODERATE 35: CPT | Performed by: INTERNAL MEDICINE

## 2023-01-20 PROCEDURE — 2580000003 HC RX 258: Performed by: INTERNAL MEDICINE

## 2023-01-20 PROCEDURE — 36415 COLL VENOUS BLD VENIPUNCTURE: CPT

## 2023-01-20 PROCEDURE — 80048 BASIC METABOLIC PNL TOTAL CA: CPT

## 2023-01-20 PROCEDURE — 84100 ASSAY OF PHOSPHORUS: CPT

## 2023-01-20 PROCEDURE — 2000000000 HC ICU R&B

## 2023-01-20 PROCEDURE — 2500000003 HC RX 250 WO HCPCS: Performed by: INTERNAL MEDICINE

## 2023-01-20 PROCEDURE — 83735 ASSAY OF MAGNESIUM: CPT

## 2023-01-20 PROCEDURE — 6370000000 HC RX 637 (ALT 250 FOR IP): Performed by: INTERNAL MEDICINE

## 2023-01-20 PROCEDURE — G0108 DIAB MANAGE TRN  PER INDIV: HCPCS

## 2023-01-20 RX ORDER — INSULIN LISPRO 100 [IU]/ML
0-16 INJECTION, SOLUTION INTRAVENOUS; SUBCUTANEOUS
Status: DISCONTINUED | OUTPATIENT
Start: 2023-01-20 | End: 2023-01-22 | Stop reason: HOSPADM

## 2023-01-20 RX ORDER — INSULIN LISPRO 100 [IU]/ML
0-4 INJECTION, SOLUTION INTRAVENOUS; SUBCUTANEOUS NIGHTLY
Status: DISCONTINUED | OUTPATIENT
Start: 2023-01-20 | End: 2023-01-22 | Stop reason: HOSPADM

## 2023-01-20 RX ORDER — INSULIN GLARGINE 100 [IU]/ML
30 INJECTION, SOLUTION SUBCUTANEOUS 2 TIMES DAILY
Status: DISCONTINUED | OUTPATIENT
Start: 2023-01-20 | End: 2023-01-22 | Stop reason: HOSPADM

## 2023-01-20 RX ORDER — INSULIN LISPRO 100 [IU]/ML
14 INJECTION, SOLUTION INTRAVENOUS; SUBCUTANEOUS
Status: DISCONTINUED | OUTPATIENT
Start: 2023-01-20 | End: 2023-01-22 | Stop reason: HOSPADM

## 2023-01-20 RX ADMIN — SODIUM PHOSPHATE, MONOBASIC, MONOHYDRATE AND SODIUM PHOSPHATE, DIBASIC, ANHYDROUS 10 MMOL: 142; 276 INJECTION, SOLUTION INTRAVENOUS at 10:46

## 2023-01-20 RX ADMIN — INSULIN LISPRO 14 UNITS: 100 INJECTION, SOLUTION INTRAVENOUS; SUBCUTANEOUS at 17:39

## 2023-01-20 RX ADMIN — POTASSIUM CHLORIDE, DEXTROSE MONOHYDRATE AND SODIUM CHLORIDE: 150; 5; 450 INJECTION, SOLUTION INTRAVENOUS at 11:50

## 2023-01-20 RX ADMIN — INSULIN LISPRO 12 UNITS: 100 INJECTION, SOLUTION INTRAVENOUS; SUBCUTANEOUS at 17:38

## 2023-01-20 RX ADMIN — INSULIN LISPRO 14 UNITS: 100 INJECTION, SOLUTION INTRAVENOUS; SUBCUTANEOUS at 13:39

## 2023-01-20 RX ADMIN — SODIUM CHLORIDE 0.11 UNITS/KG/HR: 9 INJECTION, SOLUTION INTRAVENOUS at 04:33

## 2023-01-20 RX ADMIN — INSULIN GLARGINE 30 UNITS: 100 INJECTION, SOLUTION SUBCUTANEOUS at 13:35

## 2023-01-20 RX ADMIN — INSULIN LISPRO 4 UNITS: 100 INJECTION, SOLUTION INTRAVENOUS; SUBCUTANEOUS at 13:37

## 2023-01-20 RX ADMIN — INSULIN GLARGINE 30 UNITS: 100 INJECTION, SOLUTION SUBCUTANEOUS at 20:57

## 2023-01-20 RX ADMIN — POTASSIUM CHLORIDE, DEXTROSE MONOHYDRATE AND SODIUM CHLORIDE: 150; 5; 450 INJECTION, SOLUTION INTRAVENOUS at 04:34

## 2023-01-20 RX ADMIN — INSULIN LISPRO 4 UNITS: 100 INJECTION, SOLUTION INTRAVENOUS; SUBCUTANEOUS at 20:55

## 2023-01-20 ASSESSMENT — PAIN SCALES - GENERAL
PAINLEVEL_OUTOF10: 0

## 2023-01-20 NOTE — PROGRESS NOTES
Pt remained on insulin gtt overnight with electrolytes normalizing and BG stabilizing. No other acute events overnight. Pt requesting diabetes education and plan for insulin pump.

## 2023-01-20 NOTE — CARE COORDINATION
LSW meet with pt at bedside. Discuss DC plan with pt. Pt agree with DC plan home alone. Denies any needs at this time. LSW will follow.      Electronically signed by CHACE Madden on 1/20/2023 at 9:01 AM

## 2023-01-20 NOTE — CONSULTS
[address]                      [address], [address] [facility]                                  CONSULTATION    PATIENT NAME: Sue Ortiz                      :        1960  MED REC NO:   [id number]                            ROOM:       [facility]  ACCOUNT NO:   [de-identified]                           ADMIT DATE: 2023  PROVIDER:     Pablo Cantu MD    CONSULT DATE:  2023    ENDOCRINE CONSULTATION    REFERRING PROVIDER:  Ashlee Bernardo MD    REASON FOR CONSULTATION:  Diabetic ketoacidosis. CHIEF COMPLAINT AND HISTORY OF PRESENT ILLNESS:  The patient is a  22-year-old male with recently diagnosed DKA, type 1 diabetes admitted  with hyperglycemia, uncontrolled diabetes. The patient has been  reluctant to take insulin on his own and test his blood sugar was  scheduled to get trained on his insulin pump today. Blood glucose was  724 with CO2 was 10. Admitting diagnosis with DKA. Started on IV  fluids, IV insulin drip. Glucose was 671, sodium 130, potassium 6.0,  chloride 85, CO2 was 10, BUN 36, creatinine 1.56, anion gap was 35. Most current chemistries; sodium 135, potassium 5.7, chloride 92, CO2  15, BUN 29, creatinine 1.25. PAST MEDICAL HISTORY:  Significant for type 1 diabetes. PAST SURGICAL HISTORY:  Reviewed, noncontributory. FAMILY HISTORY:  Reviewed, noncontributory. PERSONAL AND SOCIAL HISTORY:  Does smoke cigarettes. Denies any  alcohol, substance abuse. MEDICATIONS:  Here include IV fluids, IV insulin drip, Lovenox. ALLERGIES:  None. REVIEW OF SYSTEMS:  Other than hyperglycemia, polyuria, weight loss,  fatigue, 14-point review of systems is negative. PHYSICAL EXAMINATION:  GENERAL:  The patient is alert, awake, oriented x3 in no obvious  distress. VITAL SIGNS:  Blood pressure of 123/98, pulse rate was between 100-120,  respiratory rate was 70, temperature 98.4. HEENT:  Normocephalic, atraumatic.   Pupils are equally reactive to  light. Oral mucosa was dry. NECK:  Supple. Trachea in midline. CHEST:  Lungs were clear to auscultation bilaterally. No wheezing or  crackles were heard. CARDIOVASCULAR:  Heart sounds were showing tachycardia. No murmurs or  thrills were heard. ABDOMEN:  Soft, nonobese. Bowel sounds are present. EXTREMITIES:  Lower extremities reveal no edema. NEUROLOGIC:  Cranial nerves I through XII was intact. PSYCHIATRIC:  Anxious affect. SKIN:  Intact. LABORATORY DATA:  As above. ASSESSMENT:  DKA, type 1 diabetes, dehydration, acute kidney injury due  to dehydration, poor compliance. PLAN:  Continue IV fluids, IV insulin drip, subsequently once the  patient is out of DKA to switch to Lantus, Humalog. The patient to  follow with Endocrine to start insulin pump therapy. Made aware of risk  of DKA, hyperglycemia with stroke, heart disease, kidney failure. Verbalized understanding. Long-term A1c goal of 7 or lower. Most  current hemoglobin A1c from his prior admission was 11.5, in December it  was 13. Total time spent was 60 minutes. Thank you for the consult.         Shivani Mccann MD    D: 01/19/2023 22:14:25       T: 01/19/2023 22:16:43     [email or web address]  Job#: [id number]     Doc#: [id number]    CC:

## 2023-01-20 NOTE — PROGRESS NOTES
Pulmonary & Critical Care Medicine ICU Progress Note  Chief complaint : Hyperglycemia     Subjunctive/24 hour events :   Patient seen and examined during multidisciplinary rounds with RN, charge nurse, RT, pharmacy, dietitian, and social service. Patient had an uneventful night. He continues on the insulin drip, no nausea overnight and is tolerating a diet. He is on room air and O2 sats are 97%. No fever overnight and urine output 1050 for 24 hours. No Bm noted. Social History     Tobacco Use    Smoking status: Every Day     Packs/day: 0.10     Years: 35.00     Pack years: 3.50     Types: Cigarettes     Start date: 1/1/1987     Passive exposure: Never    Smokeless tobacco: Never   Substance Use Topics    Alcohol use: Not Currently     Alcohol/week: 3.0 standard drinks     Types: 3 Shots of liquor per week     History reviewed. No pertinent family history. No results for input(s): PHART, UBE7EDZ, PO2ART in the last 72 hours. MV Settings:     / / /            IV:   sodium chloride Stopped (01/19/23 2242)    dextrose 5% and 0.45% NaCl with KCl 20 mEq 150 mL/hr at 01/20/23 0434    insulin 0.252 Units/kg/hr (01/20/23 1048)       Vitals:  /85   Pulse 98   Temp 98.7 °F (37.1 °C) (Oral)   Resp 13   Ht 5' 8\" (1.727 m)   Wt 168 lb 14 oz (76.6 kg)   SpO2 97%   BMI 25.68 kg/m²    Tmax:       Intake/Output Summary (Last 24 hours) at 1/20/2023 1138  Last data filed at 1/20/2023 2649  Gross per 24 hour   Intake 2132.72 ml   Output 1050 ml   Net 1082.72 ml       EXAM:    General: alert, cooperative  Head: normocephalic, atraumatic  Eyes:No gross abnormalities. ENT:  MMM no lesions  Neck:  supple and no masses  Chest : Good air movement no rales no wheezes   Heart[de-identified] Heart sounds are normal.  Regular rate and rhythm without murmur, gallop or rub.   ABD:  bowel sounds normal, soft, non-tender  Musculoskeletal : no cyanosis, no clubbing, and no edema  Neuro:  Grossly normal  Skin: No rashes or nodules noted.  Lymph node:  no cervical nodes  Urology: No Marie   Psychiatric: appropriate    Medications:  Scheduled Meds:   enoxaparin  40 mg SubCUTAneous Daily       PRN Meds:  dextrose bolus **OR** dextrose bolus, potassium chloride, magnesium sulfate, sodium phosphate IVPB **OR** sodium phosphate IVPB **OR** sodium phosphate IVPB, polyethylene glycol, dextrose 5% and 0.45% NaCl with KCl 20 mEq    Results: reviewed by me   CBC:   Recent Labs     01/19/23  1151   WBC 12.2*   HGB 13.3*  16.3   HCT 42.5   MCV 81.5   *     BMP:   Recent Labs     01/20/23  0022 01/20/23  0338 01/20/23  0725    139 136   K 4.3 4.4 4.8    103 105   CO2 18* 21 19*   PHOS 2.4 2.6 2.3   BUN 24* 22 20   CREATININE 1.09 1.07 0.97     LIVER PROFILE:   Recent Labs     01/19/23  1151   AST 8   ALT 8   BILITOT 0.3   ALKPHOS 114*     PT/INR: No results for input(s): PROTIME, INR in the last 72 hours. APTT: No results for input(s): APTT in the last 72 hours. UA:  Recent Labs     01/19/23  1030   COLORU Yellow   PHUR 5.0   WBCUA 20-50*   RBCUA 6-10*   BACTERIA Negative   CLARITYU Clear   SPECGRAV 1.028   LEUKOCYTESUR Negative   UROBILINOGEN 0.2   BILIRUBINUR Negative   BLOODU TRACE*   GLUCOSEU >=1000*       Cultures:    No results found. Most recent    Chest CT      WITH CONTRAST:No results found for this or any previous visit. WITHOUT CONTRAST: No results found for this or any previous visit. CXR      2-view: No results found for this or any previous visit. Portable: Results for orders placed during the hospital encounter of 12/15/22    XR CHEST PORTABLE    Narrative  EXAMINATION:  ONE XRAY VIEW OF THE CHEST    12/15/2022 9:41 pm    COMPARISON:  None. HISTORY:  ORDERING SYSTEM PROVIDED HISTORY: sirs no clear source  TECHNOLOGIST PROVIDED HISTORY:  Reason for exam:->sirs no clear source  What reading provider will be dictating this exam?->CRC    FINDINGS:  The lungs are without acute focal process.   There is no effusion or  pneumothorax. The cardiomediastinal silhouette is without acute process. The  osseous structures are without acute process. Impression  No acute process. Echo No results found for this or any previous visit. Assessment:   This is a critically ill patient at risk of deterioration / death , needing close ICU monitoring and intervention due to below noted problems   DKA  RAVI, improved   Electrolyte imbalance   Diabetes with hyperglycemia, non compliant with insulin injections       Recommendations   Insulin drip protocol, endocrinology following   Monitor labs and replace per protocol   Watch urine output and volume status   Maintain blood sugar 140-180  DVT prophylaxis                 Electronically signed by ARMIN Alonso - CNP,  FCCP ,on 1/20/2023 at 11:38 AM

## 2023-01-20 NOTE — PROGRESS NOTES
Hospitalist Progress Note      PCP: No primary care provider on file. Date of Admission: 1/19/2023    Chief Complaint:  no acute events, afebrile, stable HD    Medications:  Reviewed    Infusion Medications    sodium chloride Stopped (01/19/23 2242)    dextrose 5% and 0.45% NaCl with KCl 20 mEq 150 mL/hr at 01/20/23 0434    insulin 0.168 Units/kg/hr (01/20/23 0833)     Scheduled Medications    enoxaparin  40 mg SubCUTAneous Daily     PRN Meds: dextrose bolus **OR** dextrose bolus, potassium chloride, magnesium sulfate, sodium phosphate IVPB **OR** sodium phosphate IVPB **OR** sodium phosphate IVPB, polyethylene glycol, dextrose 5% and 0.45% NaCl with KCl 20 mEq      Intake/Output Summary (Last 24 hours) at 1/20/2023 0954  Last data filed at 1/20/2023 2142  Gross per 24 hour   Intake 2132.72 ml   Output 1050 ml   Net 1082.72 ml       Exam:    /85   Pulse 98   Temp 98.7 °F (37.1 °C) (Oral)   Resp 13   Ht 5' 8\" (1.727 m)   Wt 168 lb 14 oz (76.6 kg)   SpO2 97%   BMI 25.68 kg/m²     General appearance: alert, cooperative  Lungs: clear to auscultation bilaterally  Heart: S1/S2, tachycardic  Abdomen: soft, active BS  Extremities: no edema       Labs:   Recent Labs     01/19/23  1151   WBC 12.2*   HGB 13.3*  16.3   HCT 42.5   *     Recent Labs     01/20/23  0022 01/20/23  0338 01/20/23  0725    139 136   K 4.3 4.4 4.8    103 105   CO2 18* 21 19*   BUN 24* 22 20   CREATININE 1.09 1.07 0.97   CALCIUM 9.1 8.9 8.9   PHOS 2.4 2.6 2.3     Recent Labs     01/19/23  1151   AST 8   ALT 8   BILITOT 0.3   ALKPHOS 114*     No results for input(s): INR in the last 72 hours. No results for input(s): Leslie Shanks in the last 72 hours.     Urinalysis:      Lab Results   Component Value Date/Time    NITRU Negative 01/19/2023 10:30 AM    WBCUA 20-50 01/19/2023 10:30 AM    BACTERIA Negative 01/19/2023 10:30 AM    RBCUA 6-10 01/19/2023 10:30 AM    BLOODU TRACE 01/19/2023 10:30 AM    SPECGRAV 1.028 01/19/2023 10:30 AM    GLUCOSEU >=1000 01/19/2023 10:30 AM       Radiology:  No orders to display           Assessment/Plan:    58 y.o. male with PMH of recently diagnosed IDDM, right inguinal hernia, noncompliance who presented with:     DKA  - due to noncompliance with insulin therapy  - improved with Insulin infusion and IVFs   - management per endocrinology      RAVI / hyperkalemia   - resolved with IVFs    Diet: ADULT DIET;  Regular; 5 carb choices (75 gm/meal)    Code Status: Full Code      Disposition - home when OK with Endocrinology          Electronically signed by Quan De Paz MD on 1/20/2023 at 9:54 AM

## 2023-01-20 NOTE — HOME CARE
PATIENT CURRENT WITH [facility] FOR [facility] 2/19/23.

## 2023-01-20 NOTE — PROGRESS NOTES
Robyn Branch, seen for evaluation and education on DM Type 1 . Lab Results   Component Value Date    LABA1C 11.5 (H) 01/19/2023     No results found for: Tom Leyva has had diabetes for 2022 years. Discussed with Robyn Branch, their current diabetes self-care routines prior to this admission. medication compliance: noncompliant much of the time, diabetic diet compliance: noncompliant some of the time, home glucose monitoring: is performed sporadically    Survival Skill Topics discussed:  [] Type 2 Diabetes diagnosis as chronic and progressive  [x] Type 1 Diabetes diagnosis    [x] Eating healthy - [] plate method [] portion control [] label reading [x] carb counting  [x] sources of carbohydrates   Assessment of current status: [] Needs instruction [x] Needs review   [] Comprehends key points  [] Demonstrates understanding/competence  [] Not covered     []Being active - current recommendations and safety   Assessment of current status: [] Needs instruction [] Needs review   [] Comprehends key points  [] Demonstrates understanding/competence  [x] Not covered    [x] Medications - current medications: action, side effects, precautions   Patient's medications: __Lantus and Humalog_______________________________________  Assessment of current status: [] Needs instruction [x] Needs review   [] Comprehends key points  [] Demonstrates understanding/competence  [] Not covered    [x] Monitoring - frequency & targets   Assessment of current status: [] Needs instruction [x] Needs review   [] Comprehends key points  [] Demonstrates understanding/competence  [] Not covered    [x] Signs and symptoms of hypo/hyperglycemia,  and Rule of 15 - handouts provided.    Assessment of current status: [] Needs instruction [] Needs review   [] Comprehends key points  [] Demonstrates understanding/competence  [x] Not covered    [x] Sick day management - handout provided  [] Testing for ketones   Assessment of current status: [] Needs instruction [] Needs review   [] Comprehends key points  [] Demonstrates understanding/competence  [x] Not covered     []Overview of chronic complications and how to prevent them from occurring  Assessment of current status: [] Needs instruction [] Needs review   [] Comprehends key points  [] Demonstrates understanding/competence  [x] Not covered      [x] Use of injectables -        [x] Insulin        [] GLP - 1        [] Previous use - Patient currently taking:                        Assessment of current status: [] Needs instruction [] Needs review   [] Comprehends key points  [] Demonstrates understanding/competence  [] Not covered    Reviewed the following   [x] Type of insulin - onset, peak and duration of each type Lantus and Humalog   [] GLP actions       [x] Injection site - currently uses: [x] abd  [] thighs []  upper arms  []  Inspected site for bruising, redness, infection, lipoatrophy  or   lipohypertrophy.       [] no problems                 [] problem:   [] Not injecting near umbilicus or scars/tattoos. [] Injecting near   umbilicus or scar/tattoo and advised to avoid these areas      [x] Rotates sites appropriately [] Not rotating sites and advised    [x] Proper storage of medicine  [x] Stores appropriately  [] Does not store appropriately and reinstructed    [x] History of hypoglycemia within last month? [] Yes. Was it treated appropriately? [] yes   [] no and reinstructed on proper treatment  [x] No           [] New to injectable  Patient has been prescribed:        [] Instructed today on type of insulin(s), onset, peak and duration. [] Instructed today on GLP1 injectable actions          []  Demonstrated proper administration technique using    []Vial & syringe  []Pen. []  Return demonstration via [] Self-injection __U Site:____      [] demonstrator pillow.  [] Verbally        []  Reviewed recommended injection sites, proper storage of insulin, Proper needle disposal, importance of blood glucose monitoring when taking insulin, and risk of hypoglycemia. []  Handouts given. [x] Use of meter:   [] Meter Type:              [] CGM    []  Previous use: Varies  [] Daily [] weekly [] monthly [] other:__________  [] Once a day [] twice a day [] 3 times a day [] 4 times a day or more  [] Before meal [] 2 hours after meal    [x] Reviewed technique and patient uses meter:  [] Appropriately  [x] Patient was not using appropriately and advised   [] CGM   [] CGM sensor   []Changes every ___ days  (usually 3-7 days)  [] Rotates appropriately  [] Uses approved sites for insertion  [] No signs of infection or lipodystrophy     []  Blood Glucose testing Type of meter:    [] Patient is testing ____ times a day     [] Testing to calibrate as needed  [] Checks blood glucose before driving and has a fast acting carb available     [] Has glucagon kit and significant other knows how to use it    [] Backup plan for pump failure:  [] Has written copy of current pump settings to include all of the following:   Basal: rates, times, max basal rate, type of temporary basal   Alarms: type of alarm, low reservoir volume, hours to pod expiration (Omnipod only), auto-off hours (if using that setting)   Sensor: transmitter ID, high alarm, low alarm, high snooze time, low snooze time, low threshold suspend value (if available)   Bolus: carb ratios and times, sensitivity (correction) factor and times, target blood glucose (BG) and times, insulin on board (active insulin) time, maximum bolus    [] Wrote down any changes to these settings at last appointment    [] Keeps a [] vial & syringes or [] insulin pen and pen needles on hand for pump failure   Or  [] has a prescription available                   [] Confirmed current long-acting insulin dose at last office visit.      []Long acting insulin to be given:   Dose:                         []  Pen         []   Vial/syringe  [] Patient knows/verbalized it is to be taken immediately upon pump failure   [] If long acting insulin is taken, patient knows to wait until 24 hours after last injection of long acting insulin to restart insulin pump. [] Knows the telephone number of the insulin pump company to contact when pump fails    [] Troubleshooting and problem solving.   [] Knows potential causes of high and low blood glucose levels, including:   Catheter occlusion or dislodgement  Insulin degradation if exposed to temperature extremes  Battery failure  Missed doses  Over-correction of hyperglycemia  Pump malfunction  Incorrect pump programming of infusion rates or settings for date and time     [] Patient is new to glucose meter and instructed on the following.:        [] Overview of meter - 800 number on all machines for help       [] Proper storage/ handling of meter and test strips       [] Washing hands, turning on meter - setting date and time       [] Running  checks on the test strips using control solution        [] Loading and using lancet device to obtain an adequate sample       [] Obtaining blood sample and reading results on meter       [] Proper disposal of used strips and lancets       [] Frequency of testing       [] Importance of record keeping        [] When to call their PCM with abnormal results       [] Desired blood glucose goals for optimal control - handout given       [] All of the above                    [] Patient instructed on home meter. Name of meter:      [] Patient instructed with demonstrator model in office. The patient return demonstrated:           [] verbally            [] self-tested BG ___________         Daja Rojas was seen previously as an inpatient with some outpatient follow  up calls. He has refused to learn/attempt to give his own insulin and has been relying on others so he has missed many doses he reports. He also had been unable to make it into the office for education .  Same with checking his blood sugar, he says he has only attempted one time. , relies on others to do this as well. It is noted that he may be getting an insulin pump, I advised patient they are generally placed as an outpatient and he will have to  give insulin with the pen until his appointment or malfunction , etc of pump. He says he thinks he is getting the pump placed while he is here. I advised him that I am going to instruct nursing to have him begin to administer his own insulin and do the lancet for OT while an inpatient , he is unsure if he will do. I reinforced the importance of these tasks to manage his diabetes . He also is agreeable to a phone call next week to schedule an outpatient appointment for diabetes education . SUPPORT MATERIALS  The following support materials were provided for patient to take home:  [x] Diabetes Education Josette Clemons          [] Self-care diary/log for blood glucose and food              [] Insulin how to kit          [] Diabetes ID card  [x] [facility] Diabetes Education brochure/ contact card      RECOMMENDATIONS INPATIENT PLAN:  [] Dietician Consult this admission for education on CHO diet and diet plan for home  [] Would recommend follow -up education at outpatient diabetes education at [facility]. [] An order has been placed for signature. Diabetes Education team will contact patient for appointment after discharge. [] Patient declines education at this time. Education Brochure given for future reference  [x] Education order is already in place from a previous hospitalization , patient agreeable to follow up call .    RN Bedside Support Plan:  [x] Bedside RN to support patient with administration of insulin at bedside [address]  [] Bedside RN to support patient with SMBG - OK to use home meter along-side floor meter  [] Reinforce target BG ranges, Hyper and Hypo signs and symptoms and treatment  [] Bedside RN to coordinate BG Check with mealtime and insulin  [] Bedside RN to ensure snack at [facility] for patient on HS insulin  [x] Bedside RN to reinforce survival skills education and diabetes self-care     Patient verbalizes and needs reinforcement. understanding of survival skills education.

## 2023-01-20 NOTE — FLOWSHEET NOTE
Shift summary    A&ox4, denies pain. CHRISTINA follows commands. MP SR. VSS. Blood sugar check every 2 hours, remains on insulin gtt. Skin b/w/d/I. Labs replaced per protocol. Pt stated he is going home with an insulin pump. Per Neelima Knight in diabetes education- insulin pumps are done in the office as an outpatient. She encouraged staff to encourage pt to self-inject insulin and also poke finger for blood sugar checks- pt is afraid of needles. Will follow up with Dr. Dejan Mari. PO diet ordered, pt tolerating, no n/v/d. Voiding per urinal.     1330 spoke with pt, he stated he is afraid of needles, not the pain, but the needles itself. Spent 10 min with pt allowing him exposure to needles, allowed pt to view other larger needles to compare the small size of the insulin needle. Pt was able to use lancet to poke finger, however, pt was unable to actually administer insulin. Pt did attempt though. Pt apologized, stating it was stupid how afraid he was. Validation given, pt stated he was willing to try with his dinner dose. Electronically signed by Sam Monreal RN on 1/20/2023 at 1:57 PM    1600 resting with eyes closed, vss, no needs at this time. Will monitor    [id number] pt able to use lancet to check blood sugar. Pt able to hold insulin needle and bring towards abd. Pt had noticeable physiological fear response (increased HR, breathing, tremors, tense muscles). This RN assisted pt in poking stomach and injecting insulin. Again validation given, reiterated fears are not stupid, sometimes no reason for fears. Reminded pt of end goal of insulin pump and reducing number of daily pokes.      Electronically signed by Sam Monreal RN on 1/20/2023 at 6:06 PM

## 2023-01-20 NOTE — CARE COORDINATION
from [address] [place] [address] called CM today. [phone]. 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 He will allow the home nurse to give him a shot. He is also irritated about the finger sticks for checking his BS's. She suggested that we ask Dr Sachin Beckwith if there is a weekly injection that would be a good qualifier for this pt. I will perfect serve Dr Sachin Beckwith about this and about BS testing that does not require fingersticks. I will perfect serve Dr Elenita Nuñez for an order for [place] [address] order for skilled nursing. Both perfect serves have been sent. Pt chooses [facility] and a message was left for them. [facility] does provide transportation so that pt will be able to make it to his follow-up appts. 4:59: Received message back from Dr Sachin Beckwith. He said that pt needs 4 shots. Dr Elenita Nuñez updated. 5:20:  Updated the  From [facility] by leaving a voicemail at the number above.

## 2023-01-21 LAB
ANION GAP SERPL CALCULATED.3IONS-SCNC: 11 MEQ/L (ref 9–15)
BUN BLDV-MCNC: 13 MG/DL (ref 8–23)
CALCIUM SERPL-MCNC: 8.2 MG/DL (ref 8.5–9.9)
CHLORIDE BLD-SCNC: 97 MEQ/L (ref 95–107)
CO2: 24 MEQ/L (ref 20–31)
CREAT SERPL-MCNC: 0.75 MG/DL (ref 0.7–1.2)
GFR SERPL CREATININE-BSD FRML MDRD: >60 ML/MIN/{1.73_M2}
GLUCOSE BLD-MCNC: 204 MG/DL (ref 70–99)
GLUCOSE BLD-MCNC: 246 MG/DL (ref 70–99)
GLUCOSE BLD-MCNC: 393 MG/DL (ref 70–99)
GLUCOSE BLD-MCNC: 399 MG/DL (ref 70–99)
GLUCOSE BLD-MCNC: 403 MG/DL (ref 70–99)
GLUCOSE BLD-MCNC: 415 MG/DL (ref 70–99)
MAGNESIUM: 2 MG/DL (ref 1.7–2.4)
PERFORMED ON: ABNORMAL
PHOSPHORUS: 2.1 MG/DL (ref 2.3–4.8)
POTASSIUM SERPL-SCNC: 4.2 MEQ/L (ref 3.4–4.9)
SODIUM BLD-SCNC: 132 MEQ/L (ref 135–144)

## 2023-01-21 PROCEDURE — 2060000000 HC ICU INTERMEDIATE R&B

## 2023-01-21 PROCEDURE — 84100 ASSAY OF PHOSPHORUS: CPT

## 2023-01-21 PROCEDURE — 83735 ASSAY OF MAGNESIUM: CPT

## 2023-01-21 PROCEDURE — 80048 BASIC METABOLIC PNL TOTAL CA: CPT

## 2023-01-21 PROCEDURE — 6370000000 HC RX 637 (ALT 250 FOR IP): Performed by: INTERNAL MEDICINE

## 2023-01-21 PROCEDURE — 99231 SBSQ HOSP IP/OBS SF/LOW 25: CPT | Performed by: INTERNAL MEDICINE

## 2023-01-21 PROCEDURE — 36415 COLL VENOUS BLD VENIPUNCTURE: CPT

## 2023-01-21 PROCEDURE — 6360000002 HC RX W HCPCS: Performed by: INTERNAL MEDICINE

## 2023-01-21 RX ORDER — INSULIN LISPRO 100 [IU]/ML
INJECTION, SUSPENSION SUBCUTANEOUS
Qty: 15 ADJUSTABLE DOSE PRE-FILLED PEN SYRINGE | Refills: 3 | Status: SHIPPED | OUTPATIENT
Start: 2023-01-21

## 2023-01-21 RX ADMIN — INSULIN LISPRO 14 UNITS: 100 INJECTION, SOLUTION INTRAVENOUS; SUBCUTANEOUS at 17:00

## 2023-01-21 RX ADMIN — INSULIN GLARGINE 30 UNITS: 100 INJECTION, SOLUTION SUBCUTANEOUS at 08:45

## 2023-01-21 RX ADMIN — INSULIN LISPRO 14 UNITS: 100 INJECTION, SOLUTION INTRAVENOUS; SUBCUTANEOUS at 12:20

## 2023-01-21 RX ADMIN — INSULIN LISPRO 14 UNITS: 100 INJECTION, SOLUTION INTRAVENOUS; SUBCUTANEOUS at 06:29

## 2023-01-21 RX ADMIN — INSULIN LISPRO 16 UNITS: 100 INJECTION, SOLUTION INTRAVENOUS; SUBCUTANEOUS at 12:19

## 2023-01-21 RX ADMIN — INSULIN LISPRO 4 UNITS: 100 INJECTION, SOLUTION INTRAVENOUS; SUBCUTANEOUS at 16:59

## 2023-01-21 RX ADMIN — INSULIN GLARGINE 30 UNITS: 100 INJECTION, SOLUTION SUBCUTANEOUS at 21:38

## 2023-01-21 RX ADMIN — ENOXAPARIN SODIUM 40 MG: 100 INJECTION SUBCUTANEOUS at 08:44

## 2023-01-21 ASSESSMENT — PAIN SCALES - GENERAL
PAINLEVEL_OUTOF10: 0

## 2023-01-21 ASSESSMENT — ENCOUNTER SYMPTOMS: GASTROINTESTINAL NEGATIVE: 1

## 2023-01-21 NOTE — PLAN OF CARE
Problem: Discharge Planning  Goal: Discharge to home or other facility with appropriate resources  Outcome: Progressing     Problem: Chronic Conditions and Co-morbidities  Goal: Patient's chronic conditions and co-morbidity symptoms are monitored and maintained or improved  Outcome: Progressing     Problem: ABCDS Injury Assessment  Goal: Absence of physical injury  Outcome: Progressing     Problem: Safety - Adult  Goal: Free from fall injury  Outcome: Progressing     Problem: Pain  Goal: Verbalizes/displays adequate comfort level or baseline comfort level  Outcome: Progressing  Flowsheets  Taken 1/21/2023 0000  Verbalizes/displays adequate comfort level or baseline comfort level:   Assess pain using appropriate pain scale   Encourage patient to monitor pain and request assistance  Taken 1/20/2023 2000  Verbalizes/displays adequate comfort level or baseline comfort level:   Assess pain using appropriate pain scale   Encourage patient to monitor pain and request assistance

## 2023-01-21 NOTE — PROGRESS NOTES
Pulmonary Progress Note    2023 12:09 PM    Subjective:   Admit Date: 2023  PCP: No primary care provider on file. Chief Complaint   Patient presents with    Blood Sugar Problem     Admitted with BS 1200 2 weeks ago. Started feeling bad again around midnight     Interval History: Transferred out of the ICU. Off of insulin gtt. Currently on subcu insulin. Wants to go home. No fever overnight. No respiratory issues. 14 points review of systems has been obtained and negative except to was mentioned in HPI.      Medications:   Scheduled Meds:   insulin glargine  30 Units SubCUTAneous BID    insulin lispro  14 Units SubCUTAneous TID WC    insulin lispro  0-16 Units SubCUTAneous TID WC    insulin lispro  0-4 Units SubCUTAneous Nightly    enoxaparin  40 mg SubCUTAneous Daily     Continuous Infusions:      Objective:   Vitals:   Temp (24hrs), Av.5 °F (36.9 °C), Min:98.2 °F (36.8 °C), Max:98.8 °F (37.1 °C)    /77   Pulse (!) 121   Temp 98.4 °F (36.9 °C) (Oral)   Resp 18   Ht 5' 8\" (1.727 m)   Wt 178 lb (80.7 kg)   SpO2 98%   BMI 27.06 kg/m²   I/O:24HR INTAKE/OUTPUT:    Intake/Output Summary (Last 24 hours) at 2023 1209  Last data filed at 2023 6322  Gross per 24 hour   Intake 4315.08 ml   Output 1375 ml   Net 2940.08 ml      0701 -  0700  In: 4855.1 [P.O.:2500; I.V.:2105.6]  Out: 0040 [GSLTD:7640]  CVP:             Physical Exam:  General appearance - alert, well appearing, and in no distress  Mental status - alert, oriented to person, place, and time  Eyes - pupils equal and reactive, extraocular eye movements intact  Nose - normal and patent, no erythema, discharge or polyps  Neck - supple, no significant adenopathy  Chest - clear to auscultation, no wheezes, rales or rhonchi, symmetric air entry  Heart - normal rate, regular rhythm, normal S1, S2, no murmurs, rubs, clicks or gallops  Abdomen - soft, nontender, nondistended, no masses or organomegaly  Rectal - deferred, not clinically indicated  Neurological - alert, oriented, normal speech, no focal findings or movement disorder noted, motor and sensory grossly normal bilaterally  Musculoskeletal - no joint tenderness, deformity or swelling  Extremities - peripheral pulses normal, no pedal edema, no clubbing or cyanosis  Skin - normal coloration and turgor, no rashes, no suspicious skin lesions noted              BMP:    Recent Labs     01/20/23  0338 01/20/23  0725 01/21/23  0458    136 132*   K 4.4 4.8 4.2    105 97   CO2 21 19* 24   BUN 22 20 13   CREATININE 1.07 0.97 0.75   GLUCOSE 207* 163* 399*    . MG:3,PHOS:3)@  Ionized Calcium: No results found for: IONCA  CBC:   Recent Labs     01/19/23  1151   WBC 12.2*   HGB 13.3*  16.3   *      ABG: No results for input(s): PH, PCO2, PO2 in the last 72 hours. Assessment and Plan:     Impression:    -DKA, was in the ICU on insulin gtt. This has improved. -Hyperglycemia due to noncompliance with treatment.  -Leukocytosis, likely reactive. -Mild hyponatremia.  -Tobacco abuse disorder. Recommendations:    -Diabetic teaching.  -Switch to subcu insulin. Recommend simplifying the regimen to improve his compliance.  -Electrolyte replacement.  -Counseled regarding smoking cessation. Suspect underlying chronic obstructive pulm disease. Recommend follow-up as an outpatient for PFT.        Electronically signed by Sukhdeep Abbott MD on 1/21/2023 at 12:09 PM

## 2023-01-21 NOTE — PROGRESS NOTES
Nutrition Education    Educated on Basic Diabetes diet meal planning  Learners: Patient and Family  Readiness: Acceptance  Method: Explanation and Handout  Response: Verbalizes Understanding and Demonstrated Understanding  Contact name and number provided. Basic Diabetes Diet Guidelines reviewed with patient. Reviewed CHO containing foods, portion sizes, and carbohydrate counting to meet CHO targets for each meal. Also provided information on food label reading and sample meal plans for reference. Discussed importance of meal spacing and not skipping meals and following a balanced diet rich in fruits, vegetables, whole grains and lean meats. RD contact number provided for further questions prior to discharge as needed.     Danielle Contreras, JEFF, LD

## 2023-01-21 NOTE — PROGRESS NOTES
Comprehensive Nutrition Assessment    Type and Reason for Visit:  Initial, Positive Nutrition Screen (poor po;wt loss)    Nutrition Recommendations/Plan:   Continue Carb Control 5 diet     Malnutrition Assessment:  Malnutrition Status:  No malnutrition (01/21/23 1214)    Context:  Social/Environmental Circumstances     Findings of the 6 clinical characteristics of malnutrition:  Energy Intake:  Mild decrease in energy intake (Comment)  Weight Loss:  No significant weight loss     Body Fat Loss:  No significant body fat loss     Muscle Mass Loss:  No significant muscle mass loss    Fluid Accumulation:  Unable to assess     Strength:  Not Performed    Nutrition Assessment:    Nutritional status stable at this time, pt reported slight decrease in po intake pta but currently with fair to good po intake. Will continue to monitor adequacey of intake    Nutrition Related Findings:    PMH of recently diagnosed IDDM, right inguinal hernia who presented with hyperglycemia. Patient was managed at [facility] [place] from 12/15-12/20 with DKA, RAVI, sent home on Lantus and premeal coverage, has not been taking insulin at home per report due to fear of needles. Labs: K+ 4.8 (5.7 on adm), glucose 163 (399 on adm) Wound Type: None       Current Nutrition Intake & Therapies:    Average Meal Intake: 26-50%, 51-75%  Average Supplements Intake: None Ordered  ADULT DIET; Regular; 5 carb choices (75 gm/meal)    Anthropometric Measures:  Height: 5' 8\" (172.7 cm)  Ideal Body Weight (IBW): 154 lbs (70 kg)    Admission Body Weight: 160 lb (72.6 kg) (stated)  Current Body Weight: 168 lb (76.2 kg) (1/20),  Weight Source: Bed Scale  Current BMI (kg/m2): 25.6  Usual Body Weight: 168 lb (76.2 kg) (12/15/22 bed scale.   No other EMR wt hx.  175 lb per pt)  % Weight Change (Calculated): 0                         Estimated Daily Nutrient Needs:  Energy Requirements Based On: Kcal/kg  Weight Used for Energy Requirements: Current  Energy (kcal/day): 4146-0181  (kg x 24-25)  Weight Used for Protein Requirements: Ideal  Protein (g/day): 77-84 gm (kg IBW x 1.0-1.2)  Method Used for Fluid Requirements: 1 ml/kcal  Fluid (ml/day): ~1800 ml    Nutrition Diagnosis:   Altered nutrition-related lab values related to endocrine dysfuntion as evidenced by lab values    Nutrition Interventions:   Food and/or Nutrient Delivery: Continue Current Diet (Continue Carb Control 5 diet)  Nutrition Education/Counseling: Education needed  Coordination of Nutrition Care: Continue to monitor while inpatient       Goals:     Goals: PO intake 75% or greater, other (specify)  Specify Other Goals: glucose less than 140 or goal as per endocrinology    Nutrition Monitoring and Evaluation:      Food/Nutrient Intake Outcomes: Food and Nutrient Intake  Physical Signs/Symptoms Outcomes: Biochemical Data, Weight, Meal Time Behavior    Discharge Planning:     Too soon to determine     Dawit Lewis RD, LD

## 2023-01-21 NOTE — CARE COORDINATION
Spoke with Dayna Brown and pt is good for set up with [facility].

## 2023-01-21 NOTE — FLOWSHEET NOTE
Patient refused to administer his own insulin. Pt reluctantly let me administer the insulin shot. Pt states that he doesn't like needles.

## 2023-01-21 NOTE — PROGRESS NOTES
Progress Note  Date:2023       Room:[place] [address]01  Patient Kwame Aragon     YOB: 1960     Age:62 y.o. Chief complaint diabetic ketoacidosis    Subjective    Subjective:  Symptoms:  Stable. He reports malaise and weakness. Diet:  Adequate intake. Activity level: Impaired due to weakness. Review of Systems   Gastrointestinal: Negative. Neurological:  Positive for weakness. All other systems reviewed and are negative. Objective         Vitals Last 24 Hours:  TEMPERATURE:  Temp  Av.5 °F (36.9 °C)  Min: 98 °F (36.7 °C)  Max: 98.8 °F (37.1 °C)  RESPIRATIONS RANGE: Resp  Av  Min: 18  Max: 26  PULSE OXIMETRY RANGE: SpO2  Av %  Min: 96 %  Max: 100 %  PULSE RANGE: Pulse  Av  Min: 94  Max: 112  BLOOD PRESSURE RANGE: Systolic (99WUA), TOO:311 , Min:108 , NHP:860   ; Diastolic (40ZVQ), XYF:91, Min:72, Max:88    I/O (24Hr): Intake/Output Summary (Last 24 hours) at 2023 1055  Last data filed at 2023 0151  Gross per 24 hour   Intake 4315.08 ml   Output 1375 ml   Net 2940.08 ml     Objective:  General Appearance:  Comfortable. Vital signs: (most recent): Blood pressure 110/72, pulse (!) 108, temperature 98.8 °F (37.1 °C), temperature source Oral, resp. rate 18, height 5' 8\" (1.727 m), weight 168 lb 14 oz (76.6 kg), SpO2 97 %. Vital signs are normal.    HEENT: Normal HEENT exam.    Lungs:  Normal effort and normal respiratory rate. Heart: Normal rate. Abdomen: Abdomen is soft. Extremities: Normal range of motion. Neurological: Patient is alert. Skin:  No rash.    Labs/Imaging/Diagnostics    Labs:  CBC:  Recent Labs     23  1151   WBC 12.2*   RBC 5.21   HGB 13.3*  16.3   HCT 42.5   MCV 81.5   RDW 16.6*   *     CHEMISTRIES:  Recent Labs     23  0338 23  0725 23  0458    136 132*   K 4.4 4.8 4.2    105 97   CO2 21 19* 24   BUN 22 20 13   CREATININE 1.07 0.97 0.75   GLUCOSE 207* 163* 399*   PHOS 2.6 2.3 2.1*   MG 2.3 2.9* 2.0     PT/INR:No results for input(s): PROTIME, INR in the last 72 hours. APTT:No results for input(s): APTT in the last 72 hours. LIVER PROFILE:  Recent Labs     01/19/23  1151   AST 8   ALT 8   BILITOT 0.3   ALKPHOS 114*       Imaging Last 24 Hours:  No results found. Assessment//Plan           Hospital Problems             Last Modified POA    * (Principal) Secondary DM with DKA ([facility] [address] 1/19/2023 Yes    Type 1 diabetes mellitus with ketoacidosis without coma ([facility] [address] 1/19/2023 Yes     Assessment:    Condition: In serious condition. Improving. (Status post DKA resolved  Type 1 diabetes with low C-peptide  Hemoglobin A1c has improved from baseline  Poor compliance needle phobia  Dehydration). Plan:    (Transition to Lantus 30 units twice a day plus Humalog 14 units with each meals continue fluids can be transferred to medical floor  Might consider injections twice a day 75/25 Humalog  Patient to follow-up in the office in 1 to 2 weeks  Discussed in detail regarding the serious nature of diabetic ketoacidosis and risk of heart attack stroke with high glucose  Verbalized understanding  Total time spent was 35 minutes).      Electronically signed by Violeta Rivrea MD on 1/21/23 at 10:55 AM EST

## 2023-01-21 NOTE — PLAN OF CARE
Nutrition Problem #1: Altered nutrition-related lab values  Intervention: Food and/or Nutrient Delivery: Continue Current Diet (Continue Carb Control 5 diet)

## 2023-01-21 NOTE — PROGRESS NOTES
Hospitalist Progress Note      PCP: No primary care provider on file. Date of Admission: 1/19/2023    Chief Complaint:  transferred out of ICU overnight, is refusing insulin injections per nursing report, afebrile, stable HD    Medications:  Reviewed    Infusion Medications       Scheduled Medications    insulin glargine  30 Units SubCUTAneous BID    insulin lispro  14 Units SubCUTAneous TID WC    insulin lispro  0-16 Units SubCUTAneous TID WC    insulin lispro  0-4 Units SubCUTAneous Nightly    enoxaparin  40 mg SubCUTAneous Daily     PRN Meds: dextrose bolus **OR** dextrose bolus, polyethylene glycol      Intake/Output Summary (Last 24 hours) at 1/21/2023 1042  Last data filed at 1/21/2023 2706  Gross per 24 hour   Intake 4315.08 ml   Output 1375 ml   Net 2940.08 ml         Exam:    /72   Pulse (!) 108   Temp 98.8 °F (37.1 °C) (Oral)   Resp 18   Ht 5' 8\" (1.727 m)   Wt 168 lb 14 oz (76.6 kg)   SpO2 97%   BMI 25.68 kg/m²     General appearance: alert, cooperative  Lungs: clear to auscultation bilaterally  Heart: S1/S2, tachycardic  Abdomen: soft, active BS  Extremities: no edema       Labs:   Recent Labs     01/19/23  1151   WBC 12.2*   HGB 13.3*  16.3   HCT 42.5   *       Recent Labs     01/20/23  0338 01/20/23  0725 01/21/23  0458    136 132*   K 4.4 4.8 4.2    105 97   CO2 21 19* 24   BUN 22 20 13   CREATININE 1.07 0.97 0.75   CALCIUM 8.9 8.9 8.2*   PHOS 2.6 2.3 2.1*       Recent Labs     01/19/23  1151   AST 8   ALT 8   BILITOT 0.3   ALKPHOS 114*       No results for input(s): INR in the last 72 hours. No results for input(s): Dbe Mould in the last 72 hours.     Urinalysis:      Lab Results   Component Value Date/Time    NITRU Negative 01/19/2023 10:30 AM    WBCUA 20-50 01/19/2023 10:30 AM    BACTERIA Negative 01/19/2023 10:30 AM    RBCUA 6-10 01/19/2023 10:30 AM    BLOODU TRACE 01/19/2023 10:30 AM    SPECGRAV 1.028 01/19/2023 10:30 AM    GLUCOSEU >=1000 01/19/2023 10:30 AM       Radiology:  No orders to display           Assessment/Plan:    58 y.o. male with PMH of recently diagnosed IDDM, right inguinal hernia, noncompliance who presented with:     DKA  - due to noncompliance with insulin therapy, is afraid of needles  - resolved with Insulin infusion and IVFs   - started on Lantus, premeal coverage,ISS  - management per endocrinology      RAVI / hyperkalemia   - resolved with IVFs    Diet: ADULT DIET;  Regular; 5 carb choices (75 gm/meal)    Code Status: Full Code      Disposition - home tomorrow          Electronically signed by Kathleen Lane MD on 1/21/2023 at 10:42 AM

## 2023-01-21 NOTE — PROGRESS NOTES
19:00 Bedside handoff report received from SANA Palma. 20:00 Assessments completed (see flowsheets). Pt is A&Ox4, able to make needs and wants known. Pleasant and cooperative with staff. Pt continent of bowel and bladder with use of urinal. Pt denies pain or discomfort at this time. No s/s of distress noted. Safety measures in place. 20:55-21:00 Matti Montgomery NP notified of pts blood sugar being 364. NNOs. 00:00-00:15 Pt reassessed, no changes in assessments noted (see flowsheets). Pt denies pain or discomfort. No s/s of distress noted. [facility]. Monitor room called and tele monitor requested. 00:55-01:00  Tele monitor applied and functioning. Pt safely transferred from bed to w/c with RN standby assist w/o incident. Pt safely transferred off unit in stable condition and w/o unit accompanied by RN (Melita HOOD). All pt belongings accounted for and sent with pt.

## 2023-01-22 VITALS
DIASTOLIC BLOOD PRESSURE: 89 MMHG | HEART RATE: 111 BPM | OXYGEN SATURATION: 96 % | TEMPERATURE: 98.6 F | SYSTOLIC BLOOD PRESSURE: 123 MMHG | WEIGHT: 178 LBS | RESPIRATION RATE: 18 BRPM | BODY MASS INDEX: 26.98 KG/M2 | HEIGHT: 68 IN

## 2023-01-22 LAB
ALBUMIN SERPL-MCNC: 2.4 G/DL (ref 3.5–4.6)
ALP BLD-CCNC: 69 U/L (ref 35–104)
ALT SERPL-CCNC: 7 U/L (ref 0–41)
ANION GAP SERPL CALCULATED.3IONS-SCNC: 13 MEQ/L (ref 9–15)
AST SERPL-CCNC: 19 U/L (ref 0–40)
BILIRUB SERPL-MCNC: 0.7 MG/DL (ref 0.2–0.7)
BUN BLDV-MCNC: 8 MG/DL (ref 8–23)
CALCIUM SERPL-MCNC: 8.1 MG/DL (ref 8.5–9.9)
CHLORIDE BLD-SCNC: 94 MEQ/L (ref 95–107)
CO2: 20 MEQ/L (ref 20–31)
CREAT SERPL-MCNC: 0.63 MG/DL (ref 0.7–1.2)
GFR SERPL CREATININE-BSD FRML MDRD: >60 ML/MIN/{1.73_M2}
GLOBULIN: 3.9 G/DL (ref 2.3–3.5)
GLUCOSE BLD-MCNC: 272 MG/DL (ref 70–99)
GLUCOSE BLD-MCNC: 323 MG/DL (ref 70–99)
GLUCOSE BLD-MCNC: 344 MG/DL (ref 70–99)
PERFORMED ON: ABNORMAL
PERFORMED ON: ABNORMAL
POTASSIUM SERPL-SCNC: 4.8 MEQ/L (ref 3.4–4.9)
SODIUM BLD-SCNC: 127 MEQ/L (ref 135–144)
TOTAL PROTEIN: 6.3 G/DL (ref 6.3–8)

## 2023-01-22 PROCEDURE — 6360000002 HC RX W HCPCS: Performed by: INTERNAL MEDICINE

## 2023-01-22 PROCEDURE — 36415 COLL VENOUS BLD VENIPUNCTURE: CPT

## 2023-01-22 PROCEDURE — 6370000000 HC RX 637 (ALT 250 FOR IP): Performed by: INTERNAL MEDICINE

## 2023-01-22 PROCEDURE — 80053 COMPREHEN METABOLIC PANEL: CPT

## 2023-01-22 RX ORDER — ONDANSETRON 2 MG/ML
4 INJECTION INTRAMUSCULAR; INTRAVENOUS EVERY 6 HOURS PRN
Status: DISCONTINUED | OUTPATIENT
Start: 2023-01-22 | End: 2023-01-22 | Stop reason: HOSPADM

## 2023-01-22 RX ADMIN — INSULIN LISPRO 14 UNITS: 100 INJECTION, SOLUTION INTRAVENOUS; SUBCUTANEOUS at 08:41

## 2023-01-22 RX ADMIN — INSULIN LISPRO 12 UNITS: 100 INJECTION, SOLUTION INTRAVENOUS; SUBCUTANEOUS at 08:40

## 2023-01-22 RX ADMIN — INSULIN LISPRO 12 UNITS: 100 INJECTION, SOLUTION INTRAVENOUS; SUBCUTANEOUS at 11:36

## 2023-01-22 RX ADMIN — ENOXAPARIN SODIUM 40 MG: 100 INJECTION SUBCUTANEOUS at 08:39

## 2023-01-22 RX ADMIN — INSULIN LISPRO 14 UNITS: 100 INJECTION, SOLUTION INTRAVENOUS; SUBCUTANEOUS at 11:36

## 2023-01-22 RX ADMIN — ONDANSETRON 4 MG: 2 INJECTION INTRAMUSCULAR; INTRAVENOUS at 03:08

## 2023-01-22 RX ADMIN — INSULIN GLARGINE 30 UNITS: 100 INJECTION, SOLUTION SUBCUTANEOUS at 08:40

## 2023-01-22 ASSESSMENT — PAIN SCALES - GENERAL: PAINLEVEL_OUTOF10: 0

## 2023-01-22 NOTE — FLOWSHEET NOTE
Patient instructed on how to administer an insulin shot again. Pt was not able to give himself the insulin shot. Several attempts to administer the insulin, until I gave the dose.

## 2023-01-22 NOTE — DISCHARGE INSTR - COC
Continuity of Care Form    Patient Name: Bj Rosales   :  1960  MRN:  [id number]    Admit date:  2023  Discharge date:  2023    Code Status Order: Full Code   Advance Directives:     Admitting Physician:  Edu Champion MD  PCP: No primary care provider on file. Discharging Nurse: THERESE Napier Unit/Room#: S852/U006-53  Discharging Unit Phone Number: [phone]    Emergency Contact:   Extended Emergency Contact Information  Primary Emergency Contact: Glendia Brittle [address] Phone: [phone]  Relation: Child  Secondary Emergency Contact: [address] Phone: [phone]  Relation: Brother/Sister    Past Surgical History:  History reviewed. No pertinent surgical history. Immunization History: There is no immunization history on file for this patient.     Active Problems:  Patient Active Problem List   Diagnosis Code    Type 1 diabetes mellitus with ketoacidosis without coma ([facility] [address] E10.10    Diabetic ketoacidosis with coma associated with type 2 diabetes mellitus (HCC) E11.11    Coffee ground emesis K92.0    Secondary DM with DKA ([facility] [address] E13.10       Isolation/Infection:   Isolation            No Isolation          Patient Infection Status       Infection Onset Added Last Indicated Last Indicated By Review Planned Expiration Resolved Resolved By    None active    Resolved    COVID-19 (Rule Out) 12/15/22 12/15/22 12/15/22 COVID-19, Rapid (Ordered)   12/15/22 Rule-Out Test Resulted            Nurse Assessment:  Last Vital Signs: /89   Pulse (!) 111   Temp 98.6 °F (37 °C) (Oral)   Resp 18   Ht 5' 8\" (1.727 m)   Wt 178 lb (80.7 kg)   SpO2 96%   BMI 27.06 kg/m²     Last documented pain score (0-10 scale): Pain Level: 0  Last Weight:   Wt Readings from Last 1 Encounters:   23 178 lb (80.7 kg)     Mental Status:  {IP PT MENTAL STATUS:}    IV Access:  [facility] IV ACCESS:253945388}    Nursing Mobility/ADLs:  Walking   {P DME RKOX:101083529}  Transfer  {CHP DME DTUO:755138018}  Bathing  {P DME DMES:681053117}  Dressing  {CHP DME DVUZ:257893269}  Toileting  {CHP DME DJUH:138104888}  Feeding  {P DME WMVR:426390627}  Med Admin  {[facility] DME BIT}  Med Delivery   { RYAN MED Delivery:013407620}    Wound Care Documentation and Therapy:        Elimination:  Continence: Bowel: {YES / NK:90301}  Bladder: {YES / VA:67295}  Urinary Catheter: {Urinary Catheter:124274350}   Colostomy/Ileostomy/Ileal Conduit: {YES / SX:40805}       Date of Last BM: ***    Intake/Output Summary (Last 24 hours) at 2023 1241  Last data filed at 2023 0425  Gross per 24 hour   Intake 354 ml   Output 875 ml   Net -521 ml     I/O last 3 completed shifts: In: 1 [P.O.:594]  Out: 1228 [Urine:1650]    Safety Concerns:     [address] RYAN Safety Concerns:905034714}    Impairments/Disabilities:      None    Nutrition Therapy:  Current Nutrition Therapy:   - Oral Diet:  [address] RYAN Oral XCTF:981943014}    Routes of Feeding: Oral  Liquids: Thin Liquids  Daily Fluid Restriction: no  Last Modified Barium Swallow with Video (Video Swallowing Test): not done    Treatments at the Time of Hospital Discharge:   Respiratory Treatments: ***  Oxygen Therapy:  is not on home oxygen therapy.   Ventilator:    - No ventilator support    Rehab Therapies: {THERAPEUTIC INTERVENTION:0066655967}  Weight Bearing Status/Restrictions: { CC Weight Bearin}  Other Medical Equipment (for information only, NOT a DME order):  {EQUIPMENT:273319936}  Other Treatments: ***    Patient's personal belongings (please select all that are sent with patient):  {[facility] DME Belongings:347349788}    RN SIGNATURE:  Electronically signed by Eliverto Essex, RN on 23 at 12:46 PM EST    CASE MANAGEMENT/SOCIAL WORK SECTION    Inpatient Status Date: ***    Readmission Risk Assessment Score:  Readmission Risk              Risk of Unplanned Readmission:  14 Discharging to Facility/ Agency   Name:   Address:  Phone:  Fax:    Dialysis Facility (if applicable)   Name:  Address:  Dialysis Schedule:  Phone:  Fax:    / signature: {Esignature:114738766}    PHYSICIAN SECTION    Prognosis: {Prognosis:6709083065}    Condition at Discharge: Benjamin Westbrook Patient Condition:079271099}    Rehab Potential (if transferring to Rehab): {Prognosis:0454656887}    Recommended Labs or Other Treatments After Discharge: ***    Physician Certification: I certify the above information and transfer of Madhavi Rueda  is necessary for the continuing treatment of the diagnosis listed and that he requires {Admit to Appropriate Level of Care:81414} for {GREATER/LESS:559130366} 30 days.      Update Admission H&P: {CHP DME Changes in SCOHW:284906734}    PHYSICIAN SIGNATURE:  {Esignature:597000543}

## 2023-01-22 NOTE — DISCHARGE INSTRUCTIONS
Follow up with Dr. Tyler Moncada in the next 7 days or sooner if needed. Please return to ER or call 911 if you develop any significant signs or symptoms. I may or may not have addressed all of your symptoms, medical issues,  Illnesses, or all of the abnormal blood work or imaging therefore please ask your PCP and other specialists to obtain [facility] record entirely to follow up on all of your symptoms, illnesses, abnormal labs, imaging and findings that I have and have not addressed during your hospitalization. Discharging you from the hospital does not mean that your medical care ends here and now. You may still need to have additional work up, investigation, monitoring, surveillance, and treatment to be handled from this point on by in the out patient setting by  out patient providers including your PCP, Specialists and other healthcare providers. For medication questions, contact your retail pharmacy and your PCP. Your medical team at [facility] ([facility]) appreciates the opportunity to work with you to get well!     Nadira Shea MD

## 2023-01-22 NOTE — DISCHARGE INSTR - DIET
Good nutrition is important when healing from an illness, injury, or surgery. Follow any nutrition recommendations given to you during your hospital stay. If you were given an oral nutrition supplement while in the hospital, continue to take this supplement at home. You can take it with meals, in-between meals, and/or before bedtime. These supplements can be purchased at most local grocery stores, pharmacies, and chain [employer]-stores. If you have any questions about your diet or nutrition, call the hospital and ask for the dietitian.   DIABETIC DIET

## 2023-01-22 NOTE — PROGRESS NOTES
Hospitalist Progress Note      PCP: No primary care provider on file. Date of Admission: 1/19/2023    Chief Complaint:  no acute events, afebrile, stable HD    Medications:  Reviewed    Infusion Medications       Scheduled Medications    insulin glargine  30 Units SubCUTAneous BID    insulin lispro  14 Units SubCUTAneous TID WC    insulin lispro  0-16 Units SubCUTAneous TID WC    insulin lispro  0-4 Units SubCUTAneous Nightly    enoxaparin  40 mg SubCUTAneous Daily     PRN Meds: ondansetron, dextrose bolus **OR** dextrose bolus, polyethylene glycol      Intake/Output Summary (Last 24 hours) at 1/22/2023 1032  Last data filed at 1/22/2023 0425  Gross per 24 hour   Intake 354 ml   Output 875 ml   Net -521 ml         Exam:    BP 96/66   Pulse 99   Temp 99 °F (37.2 °C) (Oral)   Resp 18   Ht 5' 8\" (1.727 m)   Wt 178 lb (80.7 kg)   SpO2 96%   BMI 27.06 kg/m²     General appearance: alert, cooperative  Lungs: clear to auscultation bilaterally  Heart: S1/S2, tachycardic  Abdomen: soft, active BS  Extremities: no edema       Labs:   Recent Labs     01/19/23  1151   WBC 12.2*   HGB 13.3*  16.3   HCT 42.5   *       Recent Labs     01/20/23  0338 01/20/23  0725 01/21/23  0458 01/22/23  0448    136 132* 127*   K 4.4 4.8 4.2 4.8    105 97 94*   CO2 21 19* 24 20   BUN 22 20 13 8   CREATININE 1.07 0.97 0.75 0.63*   CALCIUM 8.9 8.9 8.2* 8.1*   PHOS 2.6 2.3 2.1*  --        Recent Labs     01/19/23  1151 01/22/23  0448   AST 8 19   ALT 8 7   BILITOT 0.3 0.7   ALKPHOS 114* 69       No results for input(s): INR in the last 72 hours. No results for input(s): Martine Horn in the last 72 hours.     Urinalysis:      Lab Results   Component Value Date/Time    NITRU Negative 01/19/2023 10:30 AM    WBCUA 20-50 01/19/2023 10:30 AM    BACTERIA Negative 01/19/2023 10:30 AM    RBCUA 6-10 01/19/2023 10:30 AM    BLOODU TRACE 01/19/2023 10:30 AM    SPECGRAV 1.028 01/19/2023 10:30 AM    GLUCOSEU >=1000 01/19/2023 10:30 AM       Radiology:  No orders to display           Assessment/Plan:    58 y.o. male with PMH of recently diagnosed IDDM, right inguinal hernia, noncompliance who presented with:     DKA  - due to noncompliance with insulin therapy, is afraid of needles  - resolved with Insulin infusion and IVFs   - resumed Lantus, premeal coverage,ISS  - followed by diabetes educator and dietitian  - will be discharged on Humalog 75/25 per endocrinology      RAVI / hyperkalemia   - resolved with IVFs    Diet: ADULT DIET;  Regular; 5 carb choices (75 gm/meal)  ADULT ORAL NUTRITION SUPPLEMENT; Breakfast, Dinner; Diabetic Oral Supplement    Code Status: Full Code      Disposition - home today        Electronically signed by Bobby Blount MD on 1/22/2023 at 10:32 AM

## 2023-01-22 NOTE — DISCHARGE SUMMARY
Hospital Medicine Discharge Summary    Selvin Doan  :  1960  MRN:  [id number]    Admit date:  2023  Discharge date:  2023    Admitting Physician:  Dinora Emanuel MD  Primary Care Physician:  No primary care provider on file. Discharge Diagnoses:    Principal Problem:    Secondary DM with DKA ([facility] [address]  Active Problems:    Type 1 diabetes mellitus with ketoacidosis without coma ([facility] [address]  Resolved Problems:    * No resolved hospital problems. [facility] Course: Selvin Doan is a 58 y.o. male that was admitted and treated at [facility] for the following medical issues:     DKA  - due to noncompliance with insulin therapy, is afraid of needles  - resolved with Insulin infusion and IVFs   - resumed Lantus, premeal coverage,ISS  - followed by diabetes educator and dietitian  - discharged on Humalog 75/25 30 units BID due to concerns of noncompliance per endocrinology       Patient was seen by the following consultants while admitted to [facility]:   Consults:  [id number] [facility] [address]    Significant Diagnostic Studies:    No results found. Discharge Medications:         Medication List        START taking these medications      insulin lispro protamine & lispro (75-25) 100 UNIT per ML Supn injection pen  Commonly known as: HumaLOG Mix 75/25 KwikPen  30 units twice daily            CHANGE how you take these medications      * Insulin Pen Needle 32G X 4 MM Misc  qid  What changed: Another medication with the same name was added. Make sure you understand how and when to take each. * Insulin Pen Needle 32G X 4 MM Misc  1 each by Does not apply route 2 times daily  What changed: You were already taking a medication with the same name, and this prescription was added. Make sure you understand how and when to take each.            * This list has 2 medication(s) that are the same as other medications prescribed for you. Read the directions carefully, and ask your doctor or other care provider to review them with you. CONTINUE taking these medications      FreeStyle Catherine 2 [place] Debora Chauhan  As directed     . [employer] 2 Sensor Misc  Every 2 weeks     OneTouch Delica Lancets 60S Misc  qid     OneTouch Verio strip  Generic drug: blood glucose test strips  qid     OneTouch Verio w/Device Kit  As directed            STOP taking these medications      insulin lispro (1 Unit Dial) 100 UNIT/ML Sopn  Commonly known as: HumaLOG KwikPen     Lantus SoloStar 100 UNIT/ML injection pen  Generic drug: insulin glargine               Where to Get Your Medications        These medications were sent to [facility] [address], [facility] [address] [address]      Phone: [phone]   insulin lispro protamine & lispro (75-25) 100 UNIT per ML Supn injection pen  Insulin Pen Needle 32G X 4 MM Misc         Disposition:   Discharged to Home. Any [facility] needs that were indicated and/or required as been addressed and set up by Social Work. Condition at discharge: Pt was medically stable at the time of discharge. Significant improvement in clinical condition compared to initial condition at presentation to hospital    Activity: activity as tolerated, fall precautions. Total time taken for discharging this patient: 40 minutes. Greater than 70% of time was spent focused exclusively on this patient. Time was taken to review chart, discuss plans with consultants, reconciling medications, discussing plan answering questions with patient. Signed:  Bria Mitchell MD  1/22/2023, 10:36 AM  ----------------------------------------------------------------------------------------------------------------------    Amanda Otero,     Please return to ER or call 911 if you develop any significant signs or symptoms.      I may not have addressed all of your medical illnesses or the abnormal blood work or imaging therefore please ask your PCP No primary care provider on file. and other out patient specialists and providers  to obtain [facility] record entirely to follow up on all of the abnormal labs, imaging and findings that I have and have not addressed during your hospitalization. Discharging you from the hospital does not mean that your medical care ends here and now. You may still need additional work up, investigation, monitoring, and treatment to be handled from this point on by outside providers including your PCP, No primary care provider on file. , Specialists and other healthcare providers. Please review your list of discharge medications prior to resuming medications you might still have at home, as the medications you need to be taking, dosages or how often you must take them may have changed. For medication questions, contact your retail pharmacy and your PCP, No primary care provider on file. Hoy Edu I STRONGLY RECOMMEND that you follow up with No primary care provider on file. within 3 to 5 days for a post hospitalization evaluation. This specific office visit is covered by your insurance, and is not the same as your annual doctor visit/ check up. This office visit is important, as it may prevent need for repeat and/or future hospitalizations. **    Your medical team at [facility] ([facility]) appreciates the opportunity to work with you to get well!     Sincerely,  Shyanne Berry MD

## 2023-01-22 NOTE — FLOWSHEET NOTE
Discharge instructions given to the patient. All questions answered. Insulin administration gone over with the patient.

## 2023-01-26 ENCOUNTER — TELEPHONE (OUTPATIENT)
Dept: DIABETES SERVICES | Age: 63
End: 2023-01-26

## 2023-01-26 NOTE — PROGRESS NOTES
Call to [facility] to schedule IA, scheduled for 2/1 at 2pm. I inquire how things are going with his diabetes mgmt ie OT and insulin administration. He says he is giving his own insulin and checking his  blood sugars twice a day . Yesterday 313 and [phone]. I will give him a reminder call for his appointment.

## 2023-01-31 ENCOUNTER — OFFICE VISIT (OUTPATIENT)
Dept: FAMILY MEDICINE CLINIC | Age: 63
End: 2023-01-31
Payer: MEDICARE

## 2023-01-31 VITALS
DIASTOLIC BLOOD PRESSURE: 62 MMHG | SYSTOLIC BLOOD PRESSURE: 118 MMHG | HEART RATE: 110 BPM | BODY MASS INDEX: 27.06 KG/M2 | OXYGEN SATURATION: 96 % | TEMPERATURE: 97.6 F | HEIGHT: 68 IN

## 2023-01-31 DIAGNOSIS — Z12.11 SCREENING FOR COLON CANCER: ICD-10-CM

## 2023-01-31 DIAGNOSIS — Z09 HOSPITAL DISCHARGE FOLLOW-UP: Primary | ICD-10-CM

## 2023-01-31 DIAGNOSIS — E10.10 TYPE 1 DIABETES MELLITUS WITH KETOACIDOSIS WITHOUT COMA (HCC): ICD-10-CM

## 2023-01-31 PROCEDURE — G8484 FLU IMMUNIZE NO ADMIN: HCPCS | Performed by: NURSE PRACTITIONER

## 2023-01-31 PROCEDURE — 3046F HEMOGLOBIN A1C LEVEL >9.0%: CPT | Performed by: NURSE PRACTITIONER

## 2023-01-31 PROCEDURE — 3017F COLORECTAL CA SCREEN DOC REV: CPT | Performed by: NURSE PRACTITIONER

## 2023-01-31 PROCEDURE — 1111F DSCHRG MED/CURRENT MED MERGE: CPT | Performed by: NURSE PRACTITIONER

## 2023-01-31 PROCEDURE — 99214 OFFICE O/P EST MOD 30 MIN: CPT | Performed by: NURSE PRACTITIONER

## 2023-01-31 PROCEDURE — G8427 DOCREV CUR MEDS BY ELIG CLIN: HCPCS | Performed by: NURSE PRACTITIONER

## 2023-01-31 PROCEDURE — 2022F DILAT RTA XM EVC RTNOPTHY: CPT | Performed by: NURSE PRACTITIONER

## 2023-01-31 PROCEDURE — 4004F PT TOBACCO SCREEN RCVD TLK: CPT | Performed by: NURSE PRACTITIONER

## 2023-01-31 PROCEDURE — G8419 CALC BMI OUT NRM PARAM NOF/U: HCPCS | Performed by: NURSE PRACTITIONER

## 2023-01-31 SDOH — ECONOMIC STABILITY: FOOD INSECURITY: WITHIN THE PAST 12 MONTHS, THE FOOD YOU BOUGHT JUST DIDN'T LAST AND YOU DIDN'T HAVE MONEY TO GET MORE.: NEVER TRUE

## 2023-01-31 SDOH — ECONOMIC STABILITY: FOOD INSECURITY: WITHIN THE PAST 12 MONTHS, YOU WORRIED THAT YOUR FOOD WOULD RUN OUT BEFORE YOU GOT MONEY TO BUY MORE.: NEVER TRUE

## 2023-01-31 ASSESSMENT — PATIENT HEALTH QUESTIONNAIRE - PHQ9
2. FEELING DOWN, DEPRESSED OR HOPELESS: 0
SUM OF ALL RESPONSES TO PHQ QUESTIONS 1-9: 0
1. LITTLE INTEREST OR PLEASURE IN DOING THINGS: 0
SUM OF ALL RESPONSES TO PHQ9 QUESTIONS 1 & 2: 0
SUM OF ALL RESPONSES TO PHQ QUESTIONS 1-9: 0

## 2023-01-31 ASSESSMENT — ENCOUNTER SYMPTOMS
SHORTNESS OF BREATH: 0
CHEST TIGHTNESS: 0

## 2023-01-31 ASSESSMENT — SOCIAL DETERMINANTS OF HEALTH (SDOH): HOW HARD IS IT FOR YOU TO PAY FOR THE VERY BASICS LIKE FOOD, HOUSING, MEDICAL CARE, AND HEATING?: NOT HARD AT ALL

## 2023-01-31 NOTE — PROGRESS NOTES
[address] [facility] PRIMARY AND SPECIALTY CARE  [address] [facility]  [id number]  Dept: [phone]  Dept Fax: [phone]: [address] Follow Up: Darrius Damon   YOB: 1960    Date of Office Visit:  1/31/2023  Date of Hospital Admission: 1/19/23  Date of Hospital Discharge: 1/22/23      Patient Active Problem List   Diagnosis    Type 1 diabetes mellitus with ketoacidosis without coma ([address]    Diabetic ketoacidosis with coma associated with type 2 diabetes mellitus ([address]    Coffee ground emesis    Secondary DM with DKA ([address]       No Known Allergies    Medications marked \"taking\" at this time  Outpatient Medications Marked as Taking for the 1/31/23 encounter (Office Visit) with ARMIN Mojica CNP   Medication Sig Dispense Refill    insulin lispro protamine & lispro (HUMALOG MIX 75/25 KWIKPEN) (75-25) 100 UNIT per ML SUPN injection pen 30 units twice daily 15 Adjustable Dose Pre-filled Pen Syringe 3    Insulin Pen Needle 32G X 4 MM MISC 1 each by Does not apply route 2 times daily 100 each 3    Insulin Pen Needle 32G X 4 MM MISC qid 200 each 3    blood glucose test strips (ONETOUCH VERIO) strip qid 200 each 3    OneTouch Delica Lancets 27O MISC qid 200 each 4          Chief Complaint   Patient presents with    Follow-Up from MENDOZA AGUILAR     1/19-1/22    Diabetes     Last A1C=11.5       Patient trying to stick to a healthy diet. Patient states the teaching was helpful in the hospital. Patient states he wants a insulin pump as he isn't a fan of poking his finger. State she doesn't mind doing the insulin BID. Diabetes  He presents for his follow-up diabetic visit. He has type 1 diabetes mellitus. No MedicAlert identification noted. His disease course has been improving.  Pertinent negatives for hypoglycemia include no dizziness. Pertinent negatives for diabetes include no chest pain, no fatigue and no weakness. He is following a diabetic (low carb, trying to limit sugar intake) diet. Meal planning includes avoidance of concentrated sweets and carbohydrate counting. He has had a previous visit with a dietitian. Exercise: has a foot pedal bike. His lunch blood glucose is taken between 11-12 pm. His lunch blood glucose range is generally >200 mg/dl. His dinner blood glucose is taken between 6-7 pm. His dinner blood glucose range is generally >200 mg/dl. An ACE inhibitor/angiotensin II receptor blocker is not being taken. Sees podiatrist: states sometimes cold, will be seeing podiatry. Eye exam is current (is to schedule). Inpatient course: Discharge summary reviewed- Copied below:  DKA  - due to noncompliance with insulin therapy, is afraid of needles  - resolved with Insulin infusion and IVFs   - resumed Lantus, premeal coverage,ISS  - followed by diabetes educator and dietitian  - discharged on Humalog 75/25 30 units BID due to concerns of noncompliance per endocrinology      2/1- sees diabetic education  2/3 -sees Dr. Wanda Moreno    Review of Systems   Constitutional:  Negative for activity change, fatigue and fever. Respiratory:  Negative for chest tightness and shortness of breath. Cardiovascular:  Negative for chest pain and leg swelling. Skin: Negative. Neurological:  Negative for dizziness and weakness. Vitals:    01/31/23 1313   BP: 118/62   Pulse: (!) 110   Temp: 97.6 °F (36.4 °C)   TempSrc: Temporal   SpO2: 96%   Height: 5' 8\" (1.727 m)     Body mass index is 27.06 kg/m². Wt Readings from Last 3 Encounters:   01/21/23 178 lb (80.7 kg)   01/06/23 190 lb (86.2 kg)   12/17/22 184 lb 4.9 oz (83.6 kg)     BP Readings from Last 3 Encounters:   01/31/23 118/62   01/22/23 123/89   01/06/23 110/74       Physical Exam  Vitals and nursing note reviewed.    Constitutional:       General: He is not in acute distress. Appearance: He is well-developed. HENT:      Head: Normocephalic and atraumatic. Cardiovascular:      Rate and Rhythm: Normal rate and regular rhythm. Heart sounds: Normal heart sounds. No murmur heard. Pulmonary:      Effort: Pulmonary effort is normal. No respiratory distress. Breath sounds: Normal breath sounds. No wheezing. Neurological:      Mental Status: He is alert and oriented to person, place, and time. Assessment/Plan:  1. Hospital discharge follow-up  -     [place] DISCHARGE MEDS RECONCILED W/ CURRENT OUTPATIENT MED LIST  2. Type 1 diabetes mellitus with ketoacidosis without coma ([facility] [facility]  Podiatry referral placed, eye doctor exam referral placed  Continue with insulin BID    2/1- sees diabetic education  2/3 -sees Dr. El Willams, Geneseeraj Dash DPM, Podiatry, [place]  -     Amb External Referral To Ophthalmology  3. Screening for colon cancer  Cologuard ordered  -     Cologuard (Fecal DNA Colorectal Cancer Screening)           . Electronically signed by:  ARMIN Rosa CNP  1:41 PM 1/31/23

## 2023-02-01 ENCOUNTER — HOSPITAL ENCOUNTER (EMERGENCY)
Age: 63
Discharge: HOME | End: 2023-02-01
Attending: EMERGENCY MEDICINE

## 2023-02-01 ENCOUNTER — HOSPITAL ENCOUNTER (OUTPATIENT)
Dept: DIABETES SERVICES | Age: 63
Setting detail: THERAPIES SERIES
Discharge: HOME | End: 2023-02-01

## 2023-02-01 VITALS
BODY MASS INDEX: 25.9 KG/M2 | SYSTOLIC BLOOD PRESSURE: 101 MMHG | HEIGHT: 67 IN | RESPIRATION RATE: 17 BRPM | OXYGEN SATURATION: 95 % | HEART RATE: 93 BPM | WEIGHT: 165 LBS | TEMPERATURE: 97.4 F | DIASTOLIC BLOOD PRESSURE: 74 MMHG

## 2023-02-01 DIAGNOSIS — R73.9 HYPERGLYCEMIA: Primary | ICD-10-CM

## 2023-02-01 LAB
ALBUMIN SERPL-MCNC: 3.8 G/DL (ref 3.5–4.6)
ALP BLD-CCNC: 96 U/L (ref 35–104)
ALT SERPL-CCNC: 14 U/L (ref 0–41)
ANION GAP SERPL CALCULATED.3IONS-SCNC: 16 MEQ/L (ref 9–15)
AST SERPL-CCNC: 18 U/L (ref 0–40)
BASE EXCESS VENOUS: -1 (ref -3–3)
BASOPHILS ABSOLUTE: 0.1 K/UL (ref 0–0.2)
BASOPHILS RELATIVE PERCENT: 0.4 %
BILIRUB SERPL-MCNC: <0.2 MG/DL (ref 0.2–0.7)
BUN BLDV-MCNC: 29 MG/DL (ref 8–23)
CALCIUM IONIZED: 0.94 MMOL/L (ref 1.12–1.32)
CALCIUM SERPL-MCNC: 9.9 MG/DL (ref 8.5–9.9)
CHLORIDE BLD-SCNC: 90 MEQ/L (ref 95–107)
CHP ED QC CHECK: NORMAL
CO2: 25 MEQ/L (ref 20–31)
CREAT SERPL-MCNC: 0.94 MG/DL (ref 0.7–1.2)
EOSINOPHILS ABSOLUTE: 0.1 K/UL (ref 0–0.7)
EOSINOPHILS RELATIVE PERCENT: 0.5 %
GFR SERPL CREATININE-BSD FRML MDRD: >60 ML/MIN/{1.73_M2}
GFR SERPL CREATININE-BSD FRML MDRD: >60 ML/MIN/{1.73_M2}
GLOBULIN: 4.8 G/DL (ref 2.3–3.5)
GLUCOSE BLD-MCNC: 170 MG/DL (ref 70–99)
GLUCOSE BLD-MCNC: 425 MG/DL (ref 70–99)
GLUCOSE BLD-MCNC: 467 MG/DL (ref 70–99)
GLUCOSE BLD-MCNC: 468 MG/DL (ref 70–99)
GLUCOSE BLD-MCNC: 517 MG/DL (ref 70–99)
GLUCOSE BLD-MCNC: 543 MG/DL (ref 70–99)
GLUCOSE BLD-MCNC: 559 MG/DL
GLUCOSE BLD-MCNC: 559 MG/DL (ref 70–99)
HCO3 VENOUS: 22.1 MMOL/L (ref 23–29)
HCT VFR BLD CALC: 39.9 % (ref 42–52)
HEMOGLOBIN: 12.4 G/DL (ref 14–18)
HEMOGLOBIN: 14.3 GM/DL (ref 13.5–17.5)
LACTATE: 3.77 MMOL/L (ref 0.4–2)
LYMPHOCYTES ABSOLUTE: 5 K/UL (ref 1–4.8)
LYMPHOCYTES RELATIVE PERCENT: 35.2 %
MCH RBC QN AUTO: 24.7 PG (ref 27–31.3)
MCHC RBC AUTO-ENTMCNC: 31.1 % (ref 33–37)
MCV RBC AUTO: 79.4 FL (ref 79–92.2)
MONOCYTES ABSOLUTE: 0.9 K/UL (ref 0.2–0.8)
MONOCYTES RELATIVE PERCENT: 6 %
NEUTROPHILS ABSOLUTE: 8.3 K/UL (ref 1.4–6.5)
NEUTROPHILS RELATIVE PERCENT: 57.9 %
O2 SAT, VEN: 95 %
PCO2, VEN: 26.3 MM HG (ref 40–50)
PDW BLD-RTO: 18.1 % (ref 11.5–14.5)
PERFORMED ON: ABNORMAL
PH VENOUS: 7.53 (ref 7.32–7.42)
PLATELET # BLD: 662 K/UL (ref 130–400)
PO2, VEN: 66 MM HG
POC CHLORIDE: 102 MEQ/L (ref 99–110)
POC CREATININE: 0.7 MG/DL (ref 0.8–1.3)
POC HEMATOCRIT: 42 % (ref 41–53)
POC POTASSIUM: 6.1 MEQ/L (ref 3.5–5.1)
POC SAMPLE TYPE: ABNORMAL
POC SODIUM: 126 MEQ/L (ref 136–145)
POTASSIUM SERPL-SCNC: 5.2 MEQ/L (ref 3.4–4.9)
RBC # BLD: 5.02 M/UL (ref 4.7–6.1)
SODIUM BLD-SCNC: 131 MEQ/L (ref 135–144)
TCO2 CALC VENOUS: 23 MMOL/L
TOTAL PROTEIN: 8.6 G/DL (ref 6.3–8)
WBC # BLD: 14.3 K/UL (ref 4.8–10.8)

## 2023-02-01 PROCEDURE — 36600 WITHDRAWAL OF ARTERIAL BLOOD: CPT

## 2023-02-01 PROCEDURE — 82803 BLOOD GASES ANY COMBINATION: CPT

## 2023-02-01 PROCEDURE — 85025 COMPLETE CBC W/AUTO DIFF WBC: CPT

## 2023-02-01 PROCEDURE — 6370000000 HC RX 637 (ALT 250 FOR IP): Performed by: EMERGENCY MEDICINE

## 2023-02-01 PROCEDURE — 36415 COLL VENOUS BLD VENIPUNCTURE: CPT

## 2023-02-01 PROCEDURE — 84132 ASSAY OF SERUM POTASSIUM: CPT

## 2023-02-01 PROCEDURE — G0108 DIAB MANAGE TRN  PER INDIV: HCPCS

## 2023-02-01 PROCEDURE — 82330 ASSAY OF CALCIUM: CPT

## 2023-02-01 PROCEDURE — 82435 ASSAY OF BLOOD CHLORIDE: CPT

## 2023-02-01 PROCEDURE — 82565 ASSAY OF CREATININE: CPT

## 2023-02-01 PROCEDURE — 82948 REAGENT STRIP/BLOOD GLUCOSE: CPT

## 2023-02-01 PROCEDURE — 96376 TX/PRO/DX INJ SAME DRUG ADON: CPT

## 2023-02-01 PROCEDURE — 80053 COMPREHEN METABOLIC PANEL: CPT

## 2023-02-01 PROCEDURE — 96374 THER/PROPH/DIAG INJ IV PUSH: CPT

## 2023-02-01 PROCEDURE — 85014 HEMATOCRIT: CPT

## 2023-02-01 PROCEDURE — 84295 ASSAY OF SERUM SODIUM: CPT

## 2023-02-01 PROCEDURE — 99284 EMERGENCY DEPT VISIT MOD MDM: CPT

## 2023-02-01 PROCEDURE — 2580000003 HC RX 258: Performed by: EMERGENCY MEDICINE

## 2023-02-01 PROCEDURE — 83605 ASSAY OF LACTIC ACID: CPT

## 2023-02-01 RX ORDER — 0.9 % SODIUM CHLORIDE 0.9 %
1000 INTRAVENOUS SOLUTION INTRAVENOUS ONCE
Status: COMPLETED | OUTPATIENT
Start: 2023-02-01 | End: 2023-02-01

## 2023-02-01 RX ADMIN — INSULIN HUMAN 10 UNITS: 100 INJECTION, SOLUTION PARENTERAL at 17:17

## 2023-02-01 RX ADMIN — INSULIN HUMAN 8 UNITS: 100 INJECTION, SOLUTION PARENTERAL at 18:45

## 2023-02-01 RX ADMIN — SODIUM CHLORIDE 1000 ML: 9 INJECTION, SOLUTION INTRAVENOUS at 17:11

## 2023-02-01 RX ADMIN — SODIUM CHLORIDE 1000 ML: 9 INJECTION, SOLUTION INTRAVENOUS at 16:39

## 2023-02-01 RX ADMIN — SODIUM CHLORIDE 1000 ML: 9 INJECTION, SOLUTION INTRAVENOUS at 18:42

## 2023-02-01 SDOH — ECONOMIC STABILITY: FOOD INSECURITY: ADDITIONAL INFORMATION: NO

## 2023-02-01 ASSESSMENT — ENCOUNTER SYMPTOMS
SHORTNESS OF BREATH: 0
PHOTOPHOBIA: 0
EYE DISCHARGE: 0
ABDOMINAL DISTENTION: 0
SORE THROAT: 0
CHEST TIGHTNESS: 0
ABDOMINAL PAIN: 0
WHEEZING: 0
COUGH: 0
VOMITING: 0

## 2023-02-01 ASSESSMENT — PROBLEM AREAS IN DIABETES QUESTIONNAIRE (PAID)
PAID-5 TOTAL SCORE: 4
FEELING THAT DIABETES IS TAKING UP TOO MUCH OF YOUR MENTAL AND PHYSICAL ENERGY EVERY DAY: 2
WORRYING ABOUT THE FUTURE AND THE POSSIBILITY OF SERIOUS COMPLICATIONS: 2
COPING WITH COMPLICATIONS OF DIABETES: 0
FEELING SCARED WHEN YOU THINK ABOUT LIVING WITH DIABETES: 0
FEELING DEPRESSED WHEN YOU THINK ABOUT LIVING WITH DIABETES: 0

## 2023-02-01 ASSESSMENT — LIFESTYLE VARIABLES: HOW OFTEN DO YOU HAVE A DRINK CONTAINING ALCOHOL: NEVER

## 2023-02-01 ASSESSMENT — SLEEP AND FATIGUE QUESTIONNAIRES
HOW MANY HOURS OF SLEEP ARE YOU GETTING, ON AVERAGE: LESS THAN 7
HAVE YOU BEEN TOLD, OR NOTICED ON YOUR OWN, THAT YOU STOP BREATHING OR STRUGGLE TO BREATHE IN YOUR SLEEP: NO
HAVE YOU EVER BEEN TESTED FOR SLEEP APNEA: NO
HOW DO YOU RATE THE QUALITY OF YOUR SLEEP: POOR

## 2023-02-01 ASSESSMENT — PAIN - FUNCTIONAL ASSESSMENT: PAIN_FUNCTIONAL_ASSESSMENT: NONE - DENIES PAIN

## 2023-02-01 NOTE — PROGRESS NOTES
Diabetes Self- Management Education Program Assessment and Education Record-   Also see Diabetic Screening    Patient, Tosin Muir, has been diagnosed with  [x] Type 1 [] Type 2 diabetes. [x] Patient is new to diabetes, and here for initial diabetes self-management assessment and education. [] Patient is here for review of diabetes self-management assessment and education. Today's visit was in an [x] individual [] group setting. Patient came [x] alone [] with family member. Goals setting:  Initial Goal Set with Patient  [x]Yes  [] NO      MEDICAL HISTORY:  Past Medical History:   Diagnosis Date    Diabetes mellitus ([facility] [address]      Family History   Problem Relation Age of Onset    Diabetes Brother     Diabetes Maternal Grandmother      Patient has no known allergies. There is no immunization history on file for this patient. Current Medications  Current Outpatient Medications   Medication Sig Dispense Refill    insulin lispro protamine & lispro (HUMALOG MIX 75/25 KWIKPEN) (75-25) 100 UNIT per ML SUPN injection pen 30 units twice daily 15 Adjustable Dose Pre-filled Pen Syringe 3    Insulin Pen Needle 32G X 4 MM MISC 1 each by Does not apply route 2 times daily 100 each 3    Insulin Pen Needle 32G X 4 MM MISC qid 200 each 3    blood glucose test strips (ONETOUCH VERIO) strip qid 200 each 3    OneTouch Delica Lancets 46G MISC qid 200 each 4    Blood Glucose Monitoring Suppl (ONETOUCH VERIO) w/Device KIT As directed 1 kit 0    Continuous Blood Gluc  (FREESTYLE MARIPOSA 2 READER) HECTOR As directed (Patient not taking: No sig reported) 1 each 0    Continuous Blood Gluc Sensor (FREESTYLE MARIPOSA 2 SENSOR) MISC Every 2 weeks (Patient not taking: No sig reported) 2 each 5     No current facility-administered medications for this encounter.   :     Comments:  Allergies:  No Known Allergies    Diabetes 5  / Health Status    1.  A1C blood level - at goal < 7%   Lab Results   Component Value Date    LABA1C 11. 5 (H) 2023    LABA1C 10.5 2023    LABA1C 13.1 (H) 2022     Lab Results   Component Value Date    CREATININE 0.63 (L) 2023       2. Blood pressure (140/90)  Or less  BP Readings from Last 3 Encounters:   23 105/72   23 118/62   23 123/89       3. Cholesterol ( LDL under  100)   No results found for: LDLCALC, LDLCHOLESTEROL, LDLDIRECT    4. Smoking ? [x]Yes   []No    5. Taking an Asprin daily? []Yes   [x]No      Diabetes Self- Management Education Record    Participant Name: Phuong Vasquez  Referring Provider: No primary care provider on file. Assessment/Evaluation Ratings:  1=Needs Instruction   4=Demonstrates Understanding/Competency  2=Needs Review   NC=Not Covered    3=Comprehends Key Points  N/A=Not Applicable    Topics/Learning Objectives Initial Assessment Date:  Comments:  Signature:   Classes 1, 2, 3 or Individual instruction Instr. Date:  Comment:   Signature:   Reinforce Date:  Comments:  Signature: Post- Education Eval date:  Comments:  Signature:   Pathophysiology of diabetes  Define diabetes & Insulin resistance Identify own type of diabetes; role of the pancreas; signs/symptoms; diagnostic criteria; prevention & treatment options; contributing factors. Assessment Ratin  23  Diabetes Education assessment completed today. Patient has:  [x] No knowledge of diabetes disease process   [] Limited knowledge of diabetes disease process  [] Good knowledge of diabetes disease process. In this session, the role of the pancreas, insulin, and the liver in glucose utilization and insulin resistance was   [x]  Introduced  []  Reviewed. [x] ADA booklet Where Do I Begin used to reinforce teaching. Dale Honeycutt RN   [] Discussed basic pathophysiology of diabetes with the group including the pancreas, insulin, insulin resistance and the liver's involvement.      [] Reviewed the different types of DM, risk factors, diagnosis, symptoms and the treatment options. Evaluation rating:  Recent Health problems:  []Yes []No   Being Active  Verbalize effect of exercise on blood glucose levels; benefits of regular exercise; safety considerations; contraindications; maintenance of activity. Assessment Ratin23    Patient is   [] currently being active daily  [x] not currently being active daily. [x] Reviewed effects of exercise on glycemic control, risks and benefits, precautions. Patient       []has co- morbidities      [x] has no co-morbidities that recommend being seen by [facility] prior to starting exercise program for medical clearance. [x] Briefly reviewed guidelines for frequency, duration, intensity for exercise. Patient currently engages in the following activities:relatively inactive    Albert Santos RN     [] Reviewed effects of activity on glycemic control and weight loss, risks and benefits, and precautions. [] Current recommendations for being active by the American Diabetes Association reviewed. [] Discussed what patient is doing to stay active   Evaluation rating:  Have you made any changes in your activity level? []Yes []No       If yes, how? If yes, how many days/week? Monitoring blood glucose Identify recommended & personal blood glucose targets; importance of testing; testing supplies; HgbA1C target levels; Factors affecting blood glucose; Importance of logging blood glucose levels for pattern recognition; ketone testing; safe lancet disposal..   Assessment Ratin23  [x] Proficient in using meter   [] Recently started using meter  [] Not currently using meter  [] Patient does not have a meter prescribed & advised to contact PCP for order or to purchase meter. [x] Reviewed handout Desired blood glucose level citing ADA recommendations for blood glucose goals.  Discussed frequency of testing, importance of record keeping, proper handling of meter and test strips, disposal of used strips and lancets. Reviewed importance of testing as a means to evaluate effectiveness of treatment plan. [x] Does not have any Anemias or  hemoglobinopathies that may affect A1c  [] Has anemia or hemoglobinopathy that may affect A1c    Albert Santos RN   [] Discussed blood glucose monitoring to include: American Diabetes Association goals for blood glucose, effects of diet, exercise and medications on test results, frequency of testing, the possible causes and appropriate action to take if hypoglycemia (15/15 rule) or hyperglycemia occurs,   importance of record keeping to share with their PCP and when to call their PCP with abnormal results. [] Also reviewed were: proper storage and handling of both the meter and test strips; proper disposal of used strips and lancets, running  checks on the test strips using control solution and loading and using lancet device to obtain an adequate sample. Suggested times were given for routine testing. To increase testing for sick day monitoring, or when a change in diabetes medication, activity, or stress occurs. []  Aware of individualized A1C goal and current A1C. [] Checking for ketones was introduced along with prevention and symptoms of Diabetic Ketoacidosis (DKA)    Evaluation rating:    Checking blood sugar    times a day. Checking before meals? []Yes  []No     Fasting ranges in last week:      Checking 2 hours post prandial? []Yes  []No     Ranges in last week:     Checking at bedtime? []Yes  []No   Ranges in last week:     Knowledgeable of blood glucose goals? []Yes []No             Glucose meter - educate on meter use if new to monitoring.  Able to use meter appropriately   Assessment Ratin  23  [] Patient is new to glucose meter and instructed on the following.:   []Overview of meter - 800 number on all machines for help  []Proper storage/ handling of meter and test strips  []Washing hands, turning on meter - setting date and time  []Running  checks on the test strips using control solution   []Loading and using lancet device to obtain an adequate sample  []Obtaining blood sample and reading results on meter  []Proper disposal of used strips and lancets  []Frequency of testing  Importance of record keeping   []When to call their PCM with abnormal results  []Desired blood glucose goals for optimal control - handout given  [] All of the above    [] Patient instructed on home meter. Name of meter:    [] Patient instructed with demonstrator model in office. The patient return demonstrated   [] Verbally   [] Using his own meter:        [] Self-tested Bg:_____    Patient tested in the office today, he was \"Hi\" at home and took his insulin . Checked here today and he was \"Hi\" on our meter. Call to Dr Christie Cox office , he instructs patient to go to the ED. Michael Jung Patient taken to the ED via wheelchair and left at registration desk, his ride , Giacomo Riley was notified. Any Morel RN   See above  Evaluation rating:    Cleaning hands before testing? []Yes   []No    Using sides of fingers, not pads? []Yes   []No    Using  checks. []Yes   []No    Logging resuts? []Yes   []No   Risk Reduction - acute complications:  Identify symptoms of hyper & hypoglycemia, and prevention & treatment strategies. Assessment Ratin  23  [] Knowledgeable  [] Limited Knowledge  [] No knowledge   of hypo or hyperglycemia. [] Handouts reviewed covering symptoms and treatment for both. (includes Rule of 15)    Patient has []low []high risk for hypoglycemia. [] Advised to carry source of fast acting glucose at all times. []  Advised to carry ID on person identifying as having diabetes  Any Morel RN   [] Reviewed signs and symptoms of acute complications of diabetes, (hypoglycemia and hyperglycemia), possible causes and actions to take if occurs.      [] Reviewed BG guidelines and troubleshooting unexpected numbers. Evaluation rating:    Knowledge of Hypo and Hyper glycemia treatment []Yes []No     Knowledge of Hypo and Hyper glycemia prevention []Yes []No    Lows since last visit? []Yes []No      Treat appropriately? []Yes  []No    Explainable? []Yes  []No      Ketone testing? []Yes []No   Risk Reduction - sick days   Describe sick day guidelines & indications for physician notification. Identify short term consequences of poor control. Assessment Ratin23  NC   [] Advised of effects of illness on blood glucose. Reviewed management of sick days with group. Advised about importance of continuing diabetes medications, tips for staying hydrated and when to call the doctor. [] Sick day handout given.     [] Sick day toolbox reviewed. Evaluation rating:    Knowledge of sick day plan? []Yes [] No   Healthy Coping:   Identify healthy and unhealthy coping, causes of stress and ways to reduce stress. How depression affects diabetes care and how to seek help if needed. Identify support needed & support network available   Assessment Ratin  23    Patient's PAID-5 Score:4    []Patient's PAID-5 (Problem Areas in Diabetes) score is less than 9. No intervention needed at this time. [] Patient's PAID-5 (Problem Areas in Diabetes) score is greater than 8 which indicates possible diabetes related emotional distress and warrants further assessment. [] Support given during appointment. Patient advised to follow up with PCP or psychologist.   [] Letter will be sent to PCP indicating further assessment needed. Radha Sharma RN   [] Reviewed healthy coping and unhealthy coping with the group. Discussed what ways of coping each person usually uses and brainstormed ways to change to a healthy coping mechanism with the group.      [] Stressed the importance of stress reduction and the negative effects of stress on the body including elevated BG.     [] Community resources and support networks reviewed and handout provided. Evaluation rating:    Feelings/Attitudes/Concerns? Ongoing self-management support plan? [] Yes []No   Problem solving & goal setting:  Behavior Change and goal settingIdentify 7 self-care behaviors, problem solving techniques: Finding problems, identifying solutions and taking action. Assessment Ratin23  Discussed willingness to change behaviors and setting SMART goals. Patient [] is willing  [] is not willing to change at this time. José Payan RN [] Identified problem solving techniques: finding problems, identifying solutions (goal setting) and taking action. [] Patient reviewed selected goal set at initial assessment. Evaluation rating:    Identifying Barriers: Depression, unhealthy behaviors, financial    Healthy Eating: Describe effect of type, amount & timing of food on blood glucose; Describe basic meal planning techniques & current nutrition guideline  Correctly read food labels & demonstrate CHO counting & portion control with personalized meal plan. Identifies dining out strategies, & dietary sick day guidelines   Assessment Ratin23    Meal Plan patient is currently following:  [] plate method  [] portion control  [] carb counting   [] label reading  [] no meal plan    [] Booklet from [address] [place] [address] given. Reviewed plate method, portion control & label reading for carb counting utilizing booklet. [] Advised that dietitian will recommend individualized number of carbs to eat daily, but in meantime could follow basic recommendations as follows:  [] Men-   [] for weight loss - 3-4 choices/45-60 gms per meal with 1 snack of 15 gm per day. []To maintain weight: 4-5 choices/60-75  gms pre meal with 1 snack of 15 gm per day.   [] Women -   []weight loss 2-3 choices/30-45 gms per meal with 1 snack of 15 gm per day. []To maintain weight: 3-4 servings/45-60 gms pre meal with 1 snack of 15 gm per day.       Patient would benefit from   [] individual appt with dietitian  [] group MNT with dietitian    Mary Branch RN   Evaluation rating:    Are you following a meal plan? []Yes []No    [] Portion control   [] Plate method   [] Carb counting   [] Label reading    Weight loss since class? []Yes []No   Taking MedicationsIdentify effects of diabetes medicines on blood glucose levels; List diabetes medication taken, action & side effects   Assessment Ratin23  Handouts provided on both oral and injectable medications. Currently takin/25. [] Currently takes the following complementary or alternative medicines:    [x] Reviewed medication compliance, action, side effects and timing of each. Patient is:  [x]compliant with current meds. []noncompliant with current meds. Mary Branch RN   [] Discussed all medication classes both oral and injectable to include method of action, side effects and precautions. [] Patient provided handout with their medications for diabetes listed showing method of action and when to take. Evaluation rating:    Any Changes in Medications? Compliant? []Yes []No     Any side effects? []Yes [] No   Insulin / Injectable - Appropriate injection sites; proper storage; supplies needed; proper technique; safe needle disposal guidelines. Assessment Ratin23    [] Not on insulin    [] New to insulin   Patient is new to insulin and prescribed:     []  Instructed today on type of insulin(s), onset, peak and duration. []  Demonstrated proper administration technique using       []Vial & syringe       []Pen. [] Return demonstration via          [] Self-injection  __U Site:____             [] Demonstrator        []  Reviewed recommended injection sites, proper storage of insulin, Proper needle disposal, importance of blood glucose monitoring when taking insulin, and risk of hypoglycemia. []  Handouts given.     [] Previously on insulin:  and assessed the following:    [] Knowledge of type of insulin - onset, peak and duration of each type    [] Demonstrates Competency      [] Education provided      [] Proper storage of insulin:     [] Demonstrates Competency      [] Education provided          [] Site Management        [] Demonstrates Competency        [] Education provided      [] Injection site currently uses:     [] Evidence of:        [] bruising       [] infection       [] lipoatrophy        [] lipohypertrophy.      [] Injecting near umbilicus or scars/tattoos    [] Rotates sites appropriately    [] Skin Preparation technique:          [] Injection Procedure:       [] Demonstrates Competency        [] Education provided   [] Sterile Technique   [] Rolling to uniformity (if necessary)   [] Pre-injection priming (pen only)   [] Air into vial (Syringe only)   [] Correct order for mixing in same syringe (if necessary)   [] Dosing accuracy   [] Needle insertion   [] Complete injection -    [] Needle withdrawal - with waiting time before removing needle          [] Disposal Procedure:       [] Demonstrates Competency        [] Education provided    [] Has sharps container /needle clip    [] Uses sharps container / needle clip          [] Injection Device Selection:       [] Demonstrates Competency       [] Education provided    [] Appropriate needle size    [] Sufficient volume    [] Appropriate dosing increments    [] Suited to physical limitations           [] Injection adherence:       [] Demonstrates Competency        [] Education provided     [] Misses doses:         [] Daily [] Weekly          [] Monthly [] Almost Never         [] Hypoglycemia symptoms & treatment:      [] Demonstrates Competency        [] Education provided    Douglas Lala RN   [] Discussed insulin stigma and proper technique including site selection and self-injection. []  Reviewed both pen and vial/syringe use.  Discussed needle disposal and proper insulin storage and importance of times to take insulin. [] Discussed importance of blood glucose monitoring to assess current treatment effectiveness. Evaluation rating: On Insulin? []Yes []No           Injection site used: __________     Rotating sites? []Yes [] No     Problems? []Yes []No      Storing insulin correctly? []Yes [] No     Injecting at proper times? []Yes []No   IRisk Reduction - chronic complications:  Describe the relationship of blood glucose levels to long term complications of diabetes. Identify preventative measures & standards of care. Assessment Ratin23  NC  See Diabetes Assessment for last completed chronic complication prevention appointments. [] Patient is up-to-date on preventative exams and vaccines  [] Patient is not up-to date on preventative exams and vaccines and advised to discuss with PCM    [] Blood pressure is at goal  [] Blood pressure is not at goal    [] Cholesterol is at goal  [] Cholesterol is not at goal    [] Has microvascular complications  [] Has macrovascular complications  Festus Carney RN   [] Reviewed natural course of T2DM with group and how chronic complications are related to elevated blood glucose. [] Discussed macro and microvascular complications and the need for control of lipids, blood pressure, glucose levels, weight reduction and smoking cessation to help reduce risks. [] Instructed on goals for blood pressure, lipids, and glucose for optimal health. [] Individualized scorecard provided with current   health maintenance recommendations from the American Diabetes Association to include Eye, dental exams, foot exams, recommended labs and vaccines for people with diabetes. Evaluation rating:    Using Score card? [] Yes []No    Blood Pressure at goal?  [] Yes []No    Foot exams:  self-exams daily  []Yes []No  Provider yearly   []  Yes []No               Eye and dental exams up to date?   []Yes []No               Labs completed & at goal [] Yes []No        Plan if not at goal? []Yes []No  Immunizations   [] Flu   []pneumonia   []Hep B (19-59)   []Covid   Individualized goal selection. 02/01/23  My goal , to help me improve my health, I will:     1.      2.    Zuleyka Watts RN Percentage of goal completed from Initial Assessment:   %      2.    %    New revised goal: Percentage of goal completed:  1.      2.    New revised goal: Percentage of goal completed since end of class:  1.      2.    New revised goal:     Plan  Follow-up Appointments planned via phone call       Instruction Method: [x]Lecture/Discussion  []Power Point Presentation  [x]Handouts  []Return Demonstration      Education Materials/Equipment Provided:      [x] Self-Management  - Initial Assessment - Where do I Begin booklet and Counting Carbs from the ADA. [] Self-Management  Class 1 - On the Road to better managing your diabetes - Packet of Handouts from Diabetes Department    [] Self-Management  Class 2 - Meal Plan,  Choose your Foods - Food Lists for [employer] and Nutrition in the [employer] - fast facts about fast food    [] Self-Management  Class 3 -  Diabetes ID card,  foot care tips sheet,  Continuing Your Journey with Diabetes, Individualized Diabetes report card, Sick Day Rules, Diabetes Cookbooks, Magazines and Pedometers when available    [] Glucose Meter     [] BD Getting Started Insulin Kit     [] Other      Encounter Type Date Start Time End Time Comments No Show Dates   Assessment 02/01/23  Zuleyka Wtats RN 2pm 230pm 02/01/23    [] Patient has no barriers to learning and recommend attending classes. Classes scheduled for:     [] Patient has the following barriers to learning and would benefit from additional education in an individual setting. Barriers:      [] Will follow up:    [] Patient declines classes at this time.       [] Will follow up:    Zuleyka Watts RN    Class 1 - Understanding diabetes      [] First class completed today.      Class 2- Nutrition and diabetes        [] Second class completed today    Class 3 - Preventing Complications     [] Third class completed today. [] Patient has completed all 3 classes today. Goal sheets and DSMS Support Plan completed. Will follow up in 3-4 months via telephone. Patient advised to contact Diabetes Education office if any questions. Number provided. [] Patient needs to attend Class #    in order to complete classes. Scheduled for: Individual MNT         3 Month Follow-up      []In Person  []Telephone    Meter Instrx        Insulin Instrx      []Pen  []Vial & Syringe      DSMS Support Plan:  Follow-up plan:    [] Healthy Coping  [] Emotional Support  [] National [place] on Mental Illness (CRISS) - (Depression, bipolar and other support) 869.735.4499; www.criss.org    [] [address] [place] [address]  638.821.1953; www.dbsalliance. org                          [] National Suicide Prevention Tmfdbasn-185-604-8255                          [] Anxiety & Depression Association of [place]                               Find local support groups by zip code at [facility] number 296-616-9509                          [] Counseling:  ______________________________  [] Diabetes Support Groups  [] New Weigh of Life at [facility]  2nd Tuesday of the month at 10:00 [phone]  [] On-line support groups (Shobutt [employer], [employer].RChideo, ADA)  [] [facility]  [] I would be interested in a support group at [address] [facility] in [facility] if offered    [] Stress Relief     [] Yoga Class     [] Start a journal     [] Relaxation techniques     [] Ruhvrgh3Kubvx- Valeria         [] SparkQuote (Free, inspiring quote of the day)    []  Healthy Eating  [] Weight Management & Carb Counting  [] Weight Ofsvzhhj-983-860-6000; www.weightLifestyle Air. MarketShare  [] Over Eaters Yfthlpuks-168-552-2664 (support group)- www.oa. org  [] Follow up individual appointment with [facility] dietitian - call [phone]  [] Food Tracking Apps - My [address], [place]  [] ADA website - [address], weight loss  [] [facility] [place]. com      []   Monitoring  [] Glucose Sylvester (Free, tracks blood glucose, graphs)  [] MySugrApp (Basic and Pro patterns)  [] Diabetes:M - logbook    []  Being Active  [] 24 Hour Mwdebje-273-613-0240; www.Beestar  [] Ryan Adame. [employer]  [] [facility]  [] Fit Walks: FREE  [employer] in [place] on Mondays 5:30 pm  [facility] in [place] (for weather)   [place] on Thursdays at 5:30 [phone]  [] Marcheta Bustle walking videos (Some free on youtube)  [] Other Exercise Plan/Facility _____________________________________________  [] Aide Villalba to 5K    []  Reducing Risk  [] Make a sick day toolkit  [] Schedule appointments for eye, dentist  [] Stop smoking              [] Monitor Blood pressure              [] Get vaccinated                [] Other:___________________      []    Taking Medications  [] Valeria: MyTherapyApp - helps track meds  [] Seek financial help with medications:  [] Non-insulin-agents-cost-saving-resource-7-29-19. pdf   [employer]LIFE INTERACTION. org/docs/default-source/practice/educator-tools/non-insulin-agents-cost-saving-resource-7-29-19. pdf?sfvrsn=2  [] Insulin cost saving resource  [employer]LIFE INTERACTION. org/docs/default-source/practice/educator-tools/insulin-cost-saving-resources-3-4-19. pdf?sfvrsn=4  []  GetInsulin. org  []  The Affordable insulin Project http://affordableinsulinproject.org/    []  Diabetes Websites - Use as a resource for additional information  [] www.diabetes. org  [] Nehemiasare. com  [] WWW.diabeteseducator. org     Association of Diabetes Care & Education Specialists  [] www.niddk.nih.gov/health-information/diabetes/overview  [address]  [] www.medicalert. org  - offers emergency medical information service, including an ID bracelet/pendant (have to pay)    []   Journals/Magazines  [] Diabetes Forecast 8-930-171-9484; www.diabetesforecast.org  [] Diabetes Self-Management 5-688.405.4324; www.diabetesselfmanagement. 3DMGAME  [] Diabetes Health 134-965-9221; www.diabeteshealth. com    []   Other  [] Health Program offered at place of employment   [] [employer] Company's Chronic Disease Management Program  [] Follow up - Diabetes Education or Nutrition Therapy Annually (call in 1 year to set up apt)  [] Join the Living with Type 2 Diabetes Program (FREE)   www.diabetes. org/diabetes/type-2/living-with-type-2-diabetes-program  or call 1-800-DIABETES  [] Stop smoking - www.cancer. org/healthy/stay-away-from-tobacco       Post Education Referrals:        [] [address] [place] [address]  Tobacco Quit information sheet and [address] [facility] [address] [facility]      [] Dental care     [] Podiatrist     [] Ophthalmologist     [] Other    Humberto Mccann RN

## 2023-02-01 NOTE — ED PROVIDER NOTES
[address] [facility] ED  eMERGENCY dEPARTMENT eNCOUnter      Pt Name: Ghanshyam Membreno  MRN: [id number]  [place] 1960  Date of evaluation: 2/1/2023  Provider: Nikki Owusu MD    CHIEF COMPLAINT       Chief Complaint   Patient presents with    Hyperglycemia     Over 600          HISTORY OF PRESENT ILLNESS   (Location/Symptom, Timing/Onset,Context/Setting, Quality, Duration, Modifying Factors, Severity)  Note limiting factors. Ghanshyam Membreno is a 58 y.o. male who presents to the emergency department for evaluation of hyperglycemia. Patient with type 2 diabetes on insulin actually recently hospitalized for hyperglycemia complications. Comes in today with elevated blood sugar. Noticed the elevated blood sugar reading high at home today. No associated nausea or vomiting. Otherwise has no complaints of chest pain shortness of breath or lightheadedness. Is been compliant with his medication. HPI    NursingNotes were reviewed. REVIEW OF SYSTEMS    (2-9 systems for level 4, 10 or more for level 5)     Review of Systems   Constitutional:  Negative for chills and diaphoresis. HENT:  Negative for congestion, ear pain, mouth sores and sore throat. Eyes:  Negative for photophobia and discharge. Respiratory:  Negative for cough, chest tightness, shortness of breath and wheezing. Cardiovascular:  Negative for chest pain and palpitations. Gastrointestinal:  Negative for abdominal distention, abdominal pain and vomiting. Endocrine: Negative for cold intolerance. Genitourinary:  Negative for difficulty urinating. Musculoskeletal:  Negative for arthralgias. Skin:  Negative for pallor and rash. Allergic/Immunologic: Negative for immunocompromised state. Neurological:  Negative for dizziness and syncope. Hematological:  Negative for adenopathy. Psychiatric/Behavioral:  Negative for agitation and hallucinations. All other systems reviewed and are negative.     Except as noted above the remainder of the review of systems was reviewed and negative. PAST MEDICAL HISTORY     Past Medical History:   Diagnosis Date    Diabetes mellitus ([facility] [address]          SURGICALHISTORY     History reviewed. No pertinent surgical history. CURRENT MEDICATIONS       Previous Medications    BLOOD GLUCOSE MONITORING SUPPL (ONETOUCH VERIO) W/DEVICE KIT    As directed    BLOOD GLUCOSE TEST STRIPS (ONETOUCH VERIO) STRIP    qid    CONTINUOUS BLOOD GLUC  (FREESTYLE MARIPOSA 2 READER) HECTOR    As directed    CONTINUOUS BLOOD GLUC SENSOR (FREESTYLE MARIPOSA 2 SENSOR) MISC    Every 2 weeks    INSULIN LISPRO PROTAMINE & LISPRO (HUMALOG MIX 75/25 KWIKPEN) (75-25) 100 UNIT PER ML SUPN INJECTION PEN    30 units twice daily    INSULIN PEN NEEDLE 32G X 4 MM MISC    qid    INSULIN PEN NEEDLE 32G X 4 MM MISC    1 each by Does not apply route 2 times daily    ONETOUCH DELICA LANCETS 71Q MISC    qid       ALLERGIES     Patient has no known allergies.     FAMILY HISTORY       Family History   Problem Relation Age of Onset    Diabetes Brother     Diabetes Maternal Grandmother           SOCIAL HISTORY       Social History     Socioeconomic History    Marital status: Single     Spouse name: None    Number of children: None    Years of education: None    Highest education level: None   Tobacco Use    Smoking status: Every Day     Packs/day: 0.10     Years: 35.00     Pack years: 3.50     Types: Cigarettes     Start date: 1/1/1987     Passive exposure: Never    Smokeless tobacco: Never   Vaping Use    Vaping Use: Never used   Substance and Sexual Activity    Alcohol use: Not Currently     Alcohol/week: 3.0 standard drinks     Types: 3 Shots of liquor per week    Drug use: Not Currently     Types: Crack Cocaine     Comment: stopped 8-9 years ago     Social Determinants of Health     Financial Resource Strain: Low Risk     Difficulty of Paying Living Expenses: Not hard at all   Food Insecurity: No Food Insecurity    Worried About Running Out of Food in the Last Year: Never true    [address] in the Last Year: Never true       SCREENINGS    Magna Coma Scale  Eye Opening: Spontaneous  Best Verbal Response: Oriented  Best Motor Response: Obeys commands  Belinda Coma Scale Score: 15 @FLOW([id number])@      PHYSICAL EXAM    (up to 7 for level 4, 8 or more for level 5)     ED Triage Vitals [02/01/23 1505]   BP Temp Temp Source Heart Rate Resp SpO2 Height Weight   105/72 97.4 °F (36.3 °C) Oral (!) 125 18 96 % 5' 7\" (1.702 m) 165 lb (74.8 kg)       Physical Exam  Vitals and nursing note reviewed. Constitutional:       Appearance: He is well-developed. HENT:      Head: Normocephalic. Nose: Nose normal.   Eyes:      Conjunctiva/sclera: Conjunctivae normal.      Pupils: Pupils are equal, round, and reactive to light. Cardiovascular:      Rate and Rhythm: Normal rate and regular rhythm. Heart sounds: Normal heart sounds. Pulmonary:      Effort: Pulmonary effort is normal.      Breath sounds: Normal breath sounds. Abdominal:      General: Bowel sounds are normal.      Palpations: Abdomen is soft. Tenderness: There is no abdominal tenderness. There is no guarding. Musculoskeletal:         General: Normal range of motion. Cervical back: Normal range of motion and neck supple. Skin:     General: Skin is warm and dry. Capillary Refill: Capillary refill takes less than 2 seconds. Neurological:      General: No focal deficit present. Mental Status: He is alert and oriented to person, place, and time.    Psychiatric:         Mood and Affect: Mood normal.       DIAGNOSTIC RESULTS     EKG: All EKG's are interpreted by the Emergency Department Physician who either signs or Co-signsthis chart in the absence of a cardiologist.      RADIOLOGY:   Non-plain filmimages such as CT, Ultrasound and MRI are read by the radiologist. Plain radiographic images are visualized and preliminarily interpreted by the emergency physician with the below findings:      Interpretation per the Radiologist below, if available at the time ofthis note:    No orders to display         ED BEDSIDE ULTRASOUND:   Performed by ED Physician - none    LABS:  Labs Reviewed   CBC WITH AUTO DIFFERENTIAL - Abnormal; Notable for the following components:       Result Value    WBC 14.3 (*)     Hemoglobin 12.4 (*)     Hematocrit 39.9 (*)     MCH 24.7 (*)     MCHC 31.1 (*)     RDW 18.1 (*)     Platelets 721 (*)     Neutrophils Absolute 8.3 (*)     Lymphocytes Absolute 5.0 (*)     Monocytes Absolute 0.9 (*)     All other components within normal limits   COMPREHENSIVE METABOLIC PANEL - Abnormal; Notable for the following components:    Sodium 131 (*)     Potassium 5.2 (*)     Chloride 90 (*)     Anion Gap 16 (*)     Glucose 543 (*)     BUN 29 (*)     Total Protein 8.6 (*)     Globulin 4.8 (*)     All other components within normal limits    Narrative:     CALL  [place]  [facility] tel. [phone],  Chemistry, GLUC results called to and read back by BERLIN ORTIZ, 02/01/2023  17:57, by [facility]   POCT GLUCOSE - Abnormal; Notable for the following components:    POC Glucose 559 (*)     All other components within normal limits   POCT GLUCOSE - Abnormal; Notable for the following components:    POC Glucose 517 (*)     All other components within normal limits   POCT VENOUS - Abnormal; Notable for the following components:    POC Sodium 126 (*)     POC Potassium 6.1 (*)     POC Glucose 468 (*)     POC Creatinine 0.7 (*)     Calcium, Ionized 0.94 (*)     pH, Tex 7.532 (*)     pCO2, Tex 26.3 (*)     HCO3, Venous 22.1 (*)     Lactate 3.77 (*)     All other components within normal limits   POCT GLUCOSE - Abnormal; Notable for the following components:    POC Glucose 467 (*)     All other components within normal limits   POCT GLUCOSE - Abnormal; Notable for the following components:    POC Glucose 425 (*)     All other components within normal limits   POCT GLUCOSE - Abnormal; Notable for the following components:    POC Glucose 170 (*)     All other components within normal limits   POCT GLUCOSE - Normal   POCT EPOC BLOOD GAS, LACTIC ACID, ICA       All other labs were within normal range or not returned as of this dictation. EMERGENCY DEPARTMENT COURSE and DIFFERENTIAL DIAGNOSIS/MDM:   Vitals:    Vitals:    02/01/23 1815 02/01/23 1830 02/01/23 1845 02/01/23 1900   BP: (!) 115/93 115/85 115/77 115/85   Pulse: (!) 102 99 99 100   Resp:  22  20   Temp:       TempSrc:       SpO2: 96% 96% 98% 96%   Weight:       Height:            MDM    Patient's blood sugar has improved to 170. He is received a total of 3 L of fluid here heart rate has normalized. He is feeling much better and wants to go home. We will continue the current treatment plan with closer observation on his blood sugar and follow-up with Dr. Nathaniel Saavedra in the next 48 hours. CONSULTS:  None    PROCEDURES:  Unless otherwise noted below, none     Procedures    FINAL IMPRESSION      1. Hyperglycemia          DISPOSITION/PLAN   DISPOSITION Decision To Discharge 02/01/2023 08:32:44 PM      PATIENT REFERRED TO:  No follow-up provider specified.     DISCHARGE MEDICATIONS:  New Prescriptions    No medications on file          (Please note that portions of this note were completed with a voice recognition program.  Efforts were made to edit the dictations but occasionally words are mis-transcribed.)    Paco Daigle MD (electronically signed)  Attending Emergency Physician          Paco Daigle MD  02/01/23 2033

## 2023-02-01 NOTE — ED NOTES
SALLY Madrid 53, RN  02/01/23 Ozzy Pickett 109, RN  02/01/23 8381 details… Pt moaned in pain w/ internal rotation of leg and upon palpation of knee./decreased ROM due to pain detailed exam

## 2023-02-02 NOTE — ED NOTES
1L NS completed. FSBS re-assessed with result 170. Dr. Lavern Agarwal notified.       Debbie Simpson RN  02/01/23 2014

## 2023-02-02 NOTE — ED NOTES
Change of shift report received from [facility]. This RN taking over care for pt at this time.       Nadine Ganga, RN  02/01/23 1932

## 2023-02-03 ENCOUNTER — OFFICE VISIT (OUTPATIENT)
Dept: ENDOCRINOLOGY | Age: 63
End: 2023-02-03

## 2023-02-03 VITALS
HEART RATE: 107 BPM | HEIGHT: 67 IN | DIASTOLIC BLOOD PRESSURE: 71 MMHG | WEIGHT: 174 LBS | BODY MASS INDEX: 27.31 KG/M2 | OXYGEN SATURATION: 96 % | SYSTOLIC BLOOD PRESSURE: 105 MMHG

## 2023-02-03 DIAGNOSIS — E10.65 POOR CONTROL TYPE I DIABETES MELLITUS (HCC): Primary | ICD-10-CM

## 2023-02-03 LAB
CHP ED QC CHECK: NORMAL
GLUCOSE BLD-MCNC: 490 MG/DL

## 2023-02-03 RX ORDER — INSULIN LISPRO 100 [IU]/ML
INJECTION, SOLUTION INTRAVENOUS; SUBCUTANEOUS
Qty: 15 ADJUSTABLE DOSE PRE-FILLED PEN SYRINGE | Refills: 3 | Status: SHIPPED | OUTPATIENT
Start: 2023-02-03

## 2023-02-03 RX ORDER — INSULIN GLARGINE 100 [IU]/ML
INJECTION, SOLUTION SUBCUTANEOUS
Qty: 15 ADJUSTABLE DOSE PRE-FILLED PEN SYRINGE | Refills: 3 | Status: SHIPPED | OUTPATIENT
Start: 2023-02-03

## 2023-02-03 NOTE — PROGRESS NOTES
2/3/2023    Assessment:       Diagnosis Orders   1. Poor control type I diabetes mellitus (HCC)  POCT Glucose            PLAN:     Orders Placed This Encounter   Procedures    Lipid Panel     Standing Status:   Future     Standing Expiration Date:   2/3/2024    Hemoglobin A1C     Standing Status:   Future     Standing Expiration Date:   1574    Basic Metabolic Panel     Standing Status:   Future     Standing Expiration Date:   2/3/2024    POCT Glucose     Orders Placed This Encounter   Medications    insulin glargine (LANTUS SOLOSTAR) 100 UNIT/ML injection pen     Si units at bedtime     Dispense:  15 Adjustable Dose Pre-filled Pen Syringe     Refill:  3    insulin lispro, 1 Unit Dial, (HUMALOG KWIKPEN) 100 UNIT/ML SOPN     Si units at each meals     Dispense:  15 Adjustable Dose Pre-filled Pen Syringe     Refill:  3    Insulin Pen Needle 32G X 4 MM MISC     Si each by Does not apply route 2 times daily     Dispense:  100 each     Refill:  3     Discontinue 7525 start on Lantus 50 at night Humalog 14 with each meals patient to talk to [employer] representative regarding starting insulin pump compliance stressed with diet    Orders Placed This Encounter   Procedures    POCT Glucose     No orders of the defined types were placed in this encounter. No follow-ups on file.   Subjective:     Chief Complaint   Patient presents with    Diabetes     Vitals:    23 1529   BP: 105/71   Pulse: (!) 107   SpO2: 96%   Weight: 174 lb (78.9 kg)   Height: 5' 7\" (1.702 m)     Wt Readings from Last 3 Encounters:   23 174 lb (78.9 kg)   23 165 lb (74.8 kg)   23 178 lb (80.7 kg)     BP Readings from Last 3 Encounters:   23 105/71   23 101/74   23 118/62     Follow-up on type 1 diabetes recent hospital admission for DKA patient is still very poorly compliant with diet blood sugars in the 400+ range currently on 75/25 Humalog insulin occasionally he is taking his insulin  Last hemoglobin A1c was 11.5    Diabetes  He presents for his follow-up diabetic visit. He has type 2 diabetes mellitus. His disease course has been fluctuating. Associated symptoms include fatigue. Pertinent negatives for diabetes include no polydipsia and no polyuria. Symptoms are worsening. Current diabetic treatment includes insulin injections. He is currently taking insulin pre-breakfast and pre-dinner. When asked about meal planning, he reported none. Past Medical History:   Diagnosis Date    Diabetes mellitus ([facility] [address]      No past surgical history on file.   Social History     Socioeconomic History    Marital status: Single     Spouse name: Not on file    Number of children: Not on file    Years of education: Not on file    Highest education level: Not on file   Occupational History    Not on file   Tobacco Use    Smoking status: Every Day     Packs/day: 0.10     Years: 35.00     Pack years: 3.50     Types: Cigarettes     Start date: 1/1/1987     Passive exposure: Never    Smokeless tobacco: Never   Vaping Use    Vaping Use: Never used   Substance and Sexual Activity    Alcohol use: Not Currently     Alcohol/week: 3.0 standard drinks     Types: 3 Shots of liquor per week    Drug use: Not Currently     Types: Crack Cocaine     Comment: stopped 8-9 years ago    Sexual activity: Not on file   Other Topics Concern    Not on file   Social History Narrative    Not on file     Social Determinants of Health     Financial Resource Strain: Low Risk     Difficulty of Paying Living Expenses: Not hard at all   Food Insecurity: No Food Insecurity    Worried About Running Out of Food in the Last Year: Never true    Ran Out of Food in the Last Year: Never true   Transportation Needs: Not on file   Physical Activity: Not on file   Stress: Not on file   Social Connections: Not on file   Intimate Partner Violence: Not on file   Housing Stability: Not on file     Family History   Problem Relation Age of Onset    Diabetes Brother Diabetes Maternal Grandmother      No Known Allergies    Current Outpatient Medications:     insulin lispro protamine & lispro (HUMALOG MIX 75/25 KWIKPEN) (75-25) 100 UNIT per ML SUPN injection pen, 30 units twice daily, Disp: 15 Adjustable Dose Pre-filled Pen Syringe, Rfl: 3    Insulin Pen Needle 32G X 4 MM MISC, 1 each by Does not apply route 2 times daily, Disp: 100 each, Rfl: 3    Continuous Blood Gluc  (FREESTYLE MARIPOSA 2 READER) HECTOR, As directed, Disp: 1 each, Rfl: 0    Continuous Blood Gluc Sensor (FREESTYLE MARIPOSA 2 SENSOR) [facility], Every 2 weeks, Disp: 2 each, Rfl: 5    blood glucose test strips (ONETOUCH VERIO) strip, qid, Disp: 200 each, Rfl: 3    OneTouch Delica Lancets 78J MISC, qid, Disp: 200 each, Rfl: 4    Blood Glucose Monitoring Suppl (ONETOUCH VERIO) w/Device KIT, As directed, Disp: 1 kit, Rfl: 0  Lab Results   Component Value Date     (L) 02/01/2023    K 5.2 (H) 02/01/2023    CL 90 (L) 02/01/2023    CO2 25 02/01/2023    BUN 29 (H) 02/01/2023    CREATININE 0.7 (L) 02/01/2023    GLUCOSE 490 02/03/2023    CALCIUM 9.9 02/01/2023    PROT 8.6 (H) 02/01/2023    LABALBU 3.8 02/01/2023    BILITOT <0.2 02/01/2023    ALKPHOS 96 02/01/2023    AST 18 02/01/2023    ALT 14 02/01/2023    LABGLOM >60 02/01/2023    GLOB 4.8 (H) 02/01/2023     Lab Results   Component Value Date    WBC 14.3 (H) 02/01/2023    HGB 14.3 02/01/2023    HCT 39.9 (L) 02/01/2023    MCV 79.4 02/01/2023     (H) 02/01/2023     Lab Results   Component Value Date    LABA1C 11.5 (H) 01/19/2023    LABA1C 10.5 01/06/2023    LABA1C 13.1 (H) 12/17/2022     No results found for: CHOLFAST, TRIGLYCFAST, HDL, LDLCALC, CHOL, TRIG  No results found for: TESTM  No results found for: TSH, TSHREFLEX, FT3, T4FREE  No results found for: TPOABS    Review of Systems   Constitutional:  Positive for fatigue. Endocrine: Negative for polydipsia and polyuria. All other systems reviewed and are negative.     Objective:   Physical Exam  Vitals reviewed. Constitutional:       General: He is not in acute distress. Appearance: Normal appearance. HENT:      Head: Normocephalic and atraumatic. Right Ear: External ear normal.      Left Ear: External ear normal.      Nose: Nose normal.   Eyes:      General: No scleral icterus. Right eye: No discharge. Left eye: No discharge. Extraocular Movements: Extraocular movements intact. Conjunctiva/sclera: Conjunctivae normal.   Cardiovascular:      Rate and Rhythm: Normal rate. Pulmonary:      Effort: Pulmonary effort is normal.   Musculoskeletal:         General: Normal range of motion. Cervical back: Normal range of motion and neck supple. Neurological:      General: No focal deficit present. Mental Status: He is alert and oriented to person, place, and time.    Psychiatric:         Mood and Affect: Mood normal.         Behavior: Behavior normal.

## 2023-02-07 RX ORDER — LANCETS
EACH MISCELLANEOUS
Qty: 150 EACH | Refills: 3 | Status: SHIPPED | OUTPATIENT
Start: 2023-02-07

## 2023-02-07 RX ORDER — BLOOD SUGAR DIAGNOSTIC
STRIP MISCELLANEOUS
Qty: 150 EACH | Refills: 3 | Status: SHIPPED | OUTPATIENT
Start: 2023-02-07

## 2023-02-13 ENCOUNTER — TELEPHONE (OUTPATIENT)
Dept: DIABETES SERVICES | Age: 63
End: 2023-02-13

## 2023-02-13 NOTE — PROGRESS NOTES
Diabetes Self- Management Education Program Assessment and Education Record-   Also see Diabetic Screening    Patient, Afshin Boyd, has been diagnosed with  [x] Type 1 [] Type 2 diabetes. [x] Patient is new to diabetes, and here for initial diabetes self-management assessment and education. [] Patient is here for review of diabetes self-management assessment and education. Today's visit was in an [x] individual [] group setting. Patient came [x] alone [] with family member. Goals setting:  Initial Goal Set with Patient  [x]Yes  [] NO      MEDICAL HISTORY:  Past Medical History:   Diagnosis Date    Diabetes mellitus ([facility] [address]      Family History   Problem Relation Age of Onset    Diabetes Brother     Diabetes Maternal Grandmother      Patient has no known allergies. There is no immunization history on file for this patient. Current Medications  Current Outpatient Medications   Medication Sig Dispense Refill    insulin lispro protamine & lispro (HUMALOG MIX 75/25 KWIKPEN) (75-25) 100 UNIT per ML SUPN injection pen 30 units twice daily 15 Adjustable Dose Pre-filled Pen Syringe 3    Insulin Pen Needle 32G X 4 MM MISC 1 each by Does not apply route 2 times daily 100 each 3    Insulin Pen Needle 32G X 4 MM MISC qid 200 each 3    blood glucose test strips (ONETOUCH VERIO) strip qid 200 each 3    OneTouch Delica Lancets 91W MISC qid 200 each 4    Blood Glucose Monitoring Suppl (ONETOUCH VERIO) w/Device KIT As directed 1 kit 0    Continuous Blood Gluc  (FREESTYLE MARIPOSA 2 READER) HECTOR As directed (Patient not taking: No sig reported) 1 each 0    Continuous Blood Gluc Sensor (FREESTYLE MARIPOSA 2 SENSOR) MISC Every 2 weeks (Patient not taking: No sig reported) 2 each 5     No current facility-administered medications for this encounter.   :     Comments:  Allergies:  No Known Allergies    Diabetes 5  / Health Status    1.  A1C blood level - at goal < 7%   Lab Results   Component Value Date    LABA1C 11. 5 (H) 2023    LABA1C 10.5 2023    LABA1C 13.1 (H) 2022     Lab Results   Component Value Date    CREATININE 0.63 (L) 2023       2. Blood pressure (140/90)  Or less  BP Readings from Last 3 Encounters:   23 105/72   23 118/62   23 123/89       3. Cholesterol ( LDL under  100)   No results found for: LDLCALC, LDLCHOLESTEROL, LDLDIRECT    4. Smoking ? [x]Yes   []No    5. Taking an Asprin daily? []Yes   [x]No      Diabetes Self- Management Education Record    Participant Name: Pk Muñoz  Referring Provider: No primary care provider on file. Assessment/Evaluation Ratings:  1=Needs Instruction   4=Demonstrates Understanding/Competency  2=Needs Review   NC=Not Covered    3=Comprehends Key Points  N/A=Not Applicable    Topics/Learning Objectives Initial Assessment Date:  Comments:  Signature:   Classes 1, 2, 3 or Individual instruction Instr. Date:  Comment:   Signature:   Reinforce Date:  Comments:  Signature: Post- Education Eval date:  Comments:  Signature:   Pathophysiology of diabetes  Define diabetes & Insulin resistance Identify own type of diabetes; role of the pancreas; signs/symptoms; diagnostic criteria; prevention & treatment options; contributing factors. Assessment Ratin  23  Diabetes Education assessment completed today. Patient has:  [x] No knowledge of diabetes disease process   [] Limited knowledge of diabetes disease process  [] Good knowledge of diabetes disease process. In this session, the role of the pancreas, insulin, and the liver in glucose utilization and insulin resistance was   [x]  Introduced  []  Reviewed. [x] ADA booklet Where Do I Begin used to reinforce teaching. Lakesha Russell RN   [] Discussed basic pathophysiology of diabetes with the group including the pancreas, insulin, insulin resistance and the liver's involvement.      [] Reviewed the different types of DM, risk factors, diagnosis, symptoms and the treatment options. Evaluation rating:  Recent Health problems:  []Yes []No   Being Active  Verbalize effect of exercise on blood glucose levels; benefits of regular exercise; safety considerations; contraindications; maintenance of activity. Assessment Ratin23    Patient is   [] currently being active daily  [x] not currently being active daily. [x] Reviewed effects of exercise on glycemic control, risks and benefits, precautions. Patient       []has co- morbidities      [x] has no co-morbidities that recommend being seen by [facility] prior to starting exercise program for medical clearance. [x] Briefly reviewed guidelines for frequency, duration, intensity for exercise. Patient currently engages in the following activities:relatively inactive    Braxton Gamble RN     [] Reviewed effects of activity on glycemic control and weight loss, risks and benefits, and precautions. [] Current recommendations for being active by the American Diabetes Association reviewed. [] Discussed what patient is doing to stay active   Evaluation rating:  Have you made any changes in your activity level? []Yes []No       If yes, how? If yes, how many days/week? Monitoring blood glucose Identify recommended & personal blood glucose targets; importance of testing; testing supplies; HgbA1C target levels; Factors affecting blood glucose; Importance of logging blood glucose levels for pattern recognition; ketone testing; safe lancet disposal..   Assessment Ratin23  [x] Proficient in using meter   [] Recently started using meter  [] Not currently using meter  [] Patient does not have a meter prescribed & advised to contact PCP for order or to purchase meter. [x] Reviewed handout Desired blood glucose level citing ADA recommendations for blood glucose goals.  Discussed frequency of testing, importance of record keeping, proper handling of meter and test strips, disposal of used strips and lancets. Reviewed importance of testing as a means to evaluate effectiveness of treatment plan. [x] Does not have any Anemias or  hemoglobinopathies that may affect A1c  [] Has anemia or hemoglobinopathy that may affect A1c    Omar Carranza RN   [] Discussed blood glucose monitoring to include: American Diabetes Association goals for blood glucose, effects of diet, exercise and medications on test results, frequency of testing, the possible causes and appropriate action to take if hypoglycemia (15/15 rule) or hyperglycemia occurs,   importance of record keeping to share with their PCP and when to call their PCP with abnormal results. [] Also reviewed were: proper storage and handling of both the meter and test strips; proper disposal of used strips and lancets, running  checks on the test strips using control solution and loading and using lancet device to obtain an adequate sample. Suggested times were given for routine testing. To increase testing for sick day monitoring, or when a change in diabetes medication, activity, or stress occurs. []  Aware of individualized A1C goal and current A1C. [] Checking for ketones was introduced along with prevention and symptoms of Diabetic Ketoacidosis (DKA)    Evaluation rating:    Checking blood sugar    times a day. Checking before meals? []Yes  []No     Fasting ranges in last week:      Checking 2 hours post prandial? []Yes  []No     Ranges in last week:     Checking at bedtime? []Yes  []No   Ranges in last week:     Knowledgeable of blood glucose goals? []Yes []No             Glucose meter - educate on meter use if new to monitoring.  Able to use meter appropriately   Assessment Ratin  23  [] Patient is new to glucose meter and instructed on the following.:   []Overview of meter - 800 number on all machines for help  []Proper storage/ handling of meter and test strips  []Washing hands, turning on meter - setting date and time  []Running  checks on the test strips using control solution   []Loading and using lancet device to obtain an adequate sample  []Obtaining blood sample and reading results on meter  []Proper disposal of used strips and lancets  []Frequency of testing  Importance of record keeping   []When to call their PCM with abnormal results  []Desired blood glucose goals for optimal control - handout given  [] All of the above    [] Patient instructed on home meter. Name of meter:    [] Patient instructed with demonstrator model in office. The patient return demonstrated   [] Verbally   [] Using his own meter:        [] Self-tested Bg:_____    Patient tested in the office today, he was \"Hi\" at home and took his insulin . Checked here today and he was \"Hi\" on our meter. Call to Dr Paloma Boykin office , he instructs patient to go to the ED. Edna Stafford Patient taken to the ED via wheelchair and left at registration desk, his ride , Elizabeth Monroy was notified. Livia Rahman RN   See above  Evaluation rating:    Cleaning hands before testing? []Yes   []No    Using sides of fingers, not pads? []Yes   []No    Using  checks. []Yes   []No    Logging resuts? []Yes   []No   Risk Reduction - acute complications:  Identify symptoms of hyper & hypoglycemia, and prevention & treatment strategies. Assessment Ratin  23  [] Knowledgeable  [] Limited Knowledge  [] No knowledge   of hypo or hyperglycemia. [] Handouts reviewed covering symptoms and treatment for both. (includes Rule of 15)    Patient has []low []high risk for hypoglycemia. [] Advised to carry source of fast acting glucose at all times. []  Advised to carry ID on person identifying as having diabetes  Livia Rahman RN   [] Reviewed signs and symptoms of acute complications of diabetes, (hypoglycemia and hyperglycemia), possible causes and actions to take if occurs.      [] Reviewed BG guidelines and troubleshooting unexpected numbers. Evaluation rating:    Knowledge of Hypo and Hyper glycemia treatment []Yes []No     Knowledge of Hypo and Hyper glycemia prevention []Yes []No    Lows since last visit? []Yes []No      Treat appropriately? []Yes  []No    Explainable? []Yes  []No      Ketone testing? []Yes []No   Risk Reduction - sick days   Describe sick day guidelines & indications for physician notification. Identify short term consequences of poor control. Assessment Ratin23  NC   [] Advised of effects of illness on blood glucose. Reviewed management of sick days with group. Advised about importance of continuing diabetes medications, tips for staying hydrated and when to call the doctor. [] Sick day handout given.     [] Sick day toolbox reviewed. Evaluation rating:    Knowledge of sick day plan? []Yes [] No   Healthy Coping:   Identify healthy and unhealthy coping, causes of stress and ways to reduce stress. How depression affects diabetes care and how to seek help if needed. Identify support needed & support network available   Assessment Ratin  23    Patient's PAID-5 Score:4    []Patient's PAID-5 (Problem Areas in Diabetes) score is less than 9. No intervention needed at this time. [] Patient's PAID-5 (Problem Areas in Diabetes) score is greater than 8 which indicates possible diabetes related emotional distress and warrants further assessment. [] Support given during appointment. Patient advised to follow up with PCP or psychologist.   [] Letter will be sent to PCP indicating further assessment needed. Haritha Beach RN   [] Reviewed healthy coping and unhealthy coping with the group. Discussed what ways of coping each person usually uses and brainstormed ways to change to a healthy coping mechanism with the group.      [] Stressed the importance of stress reduction and the negative effects of stress on the body including elevated BG.     [] Community resources and support networks reviewed and handout provided. Evaluation rating:    Feelings/Attitudes/Concerns? Ongoing self-management support plan? [] Yes []No   Problem solving & goal setting:  Behavior Change and goal settingIdentify 7 self-care behaviors, problem solving techniques: Finding problems, identifying solutions and taking action. Assessment Ratin23  Discussed willingness to change behaviors and setting SMART goals. Patient [] is willing  [] is not willing to change at this time. Micah Ramirez RN [] Identified problem solving techniques: finding problems, identifying solutions (goal setting) and taking action. [] Patient reviewed selected goal set at initial assessment. Evaluation rating:    Identifying Barriers: Depression, unhealthy behaviors, financial    Healthy Eating: Describe effect of type, amount & timing of food on blood glucose; Describe basic meal planning techniques & current nutrition guideline  Correctly read food labels & demonstrate CHO counting & portion control with personalized meal plan. Identifies dining out strategies, & dietary sick day guidelines   Assessment Ratin23    Meal Plan patient is currently following:  [] plate method  [] portion control  [] carb counting   [] label reading  [] no meal plan    [] Booklet from [address] [place] [address] given. Reviewed plate method, portion control & label reading for carb counting utilizing booklet. [] Advised that dietitian will recommend individualized number of carbs to eat daily, but in meantime could follow basic recommendations as follows:  [] Men-   [] for weight loss - 3-4 choices/45-60 gms per meal with 1 snack of 15 gm per day. []To maintain weight: 4-5 choices/60-75  gms pre meal with 1 snack of 15 gm per day.   [] Women -   []weight loss 2-3 choices/30-45 gms per meal with 1 snack of 15 gm per day. []To maintain weight: 3-4 servings/45-60 gms pre meal with 1 snack of 15 gm per day.       Patient would benefit from   [] individual appt with dietitian  [] group MNT with dietitian    Gerard Slater RN   Evaluation rating:    Are you following a meal plan? []Yes []No    [] Portion control   [] Plate method   [] Carb counting   [] Label reading    Weight loss since class? []Yes []No   Taking MedicationsIdentify effects of diabetes medicines on blood glucose levels; List diabetes medication taken, action & side effects   Assessment Ratin23  Handouts provided on both oral and injectable medications. Currently takin/25. [] Currently takes the following complementary or alternative medicines:    [x] Reviewed medication compliance, action, side effects and timing of each. Patient is:  [x]compliant with current meds. []noncompliant with current meds. Gerard Slater RN   [] Discussed all medication classes both oral and injectable to include method of action, side effects and precautions. [] Patient provided handout with their medications for diabetes listed showing method of action and when to take. Evaluation rating:    Any Changes in Medications? Compliant? []Yes []No     Any side effects? []Yes [] No   Insulin / Injectable - Appropriate injection sites; proper storage; supplies needed; proper technique; safe needle disposal guidelines. Assessment Ratin23    [] Not on insulin    [] New to insulin   Patient is new to insulin and prescribed:     []  Instructed today on type of insulin(s), onset, peak and duration. []  Demonstrated proper administration technique using       []Vial & syringe       []Pen. [] Return demonstration via          [] Self-injection  __U Site:____             [] Demonstrator        []  Reviewed recommended injection sites, proper storage of insulin, Proper needle disposal, importance of blood glucose monitoring when taking insulin, and risk of hypoglycemia. []  Handouts given.     [] Previously on insulin:  and assessed the following:    [] Knowledge of type of insulin - onset, peak and duration of each type    [] Demonstrates Competency      [] Education provided      [] Proper storage of insulin:     [] Demonstrates Competency      [] Education provided          [] Site Management        [] Demonstrates Competency        [] Education provided      [] Injection site currently uses:     [] Evidence of:        [] bruising       [] infection       [] lipoatrophy        [] lipohypertrophy.      [] Injecting near umbilicus or scars/tattoos    [] Rotates sites appropriately    [] Skin Preparation technique:          [] Injection Procedure:       [] Demonstrates Competency        [] Education provided   [] Sterile Technique   [] Rolling to uniformity (if necessary)   [] Pre-injection priming (pen only)   [] Air into vial (Syringe only)   [] Correct order for mixing in same syringe (if necessary)   [] Dosing accuracy   [] Needle insertion   [] Complete injection -    [] Needle withdrawal - with waiting time before removing needle          [] Disposal Procedure:       [] Demonstrates Competency        [] Education provided    [] Has sharps container /needle clip    [] Uses sharps container / needle clip          [] Injection Device Selection:       [] Demonstrates Competency       [] Education provided    [] Appropriate needle size    [] Sufficient volume    [] Appropriate dosing increments    [] Suited to physical limitations           [] Injection adherence:       [] Demonstrates Competency        [] Education provided     [] Misses doses:         [] Daily [] Weekly          [] Monthly [] Almost Never         [] Hypoglycemia symptoms & treatment:      [] Demonstrates Competency        [] Education provided    Omar Carranza RN   [] Discussed insulin stigma and proper technique including site selection and self-injection. []  Reviewed both pen and vial/syringe use.  Discussed needle disposal and proper insulin storage and importance of times to take insulin. [] Discussed importance of blood glucose monitoring to assess current treatment effectiveness. Evaluation rating: On Insulin? []Yes []No           Injection site used: __________     Rotating sites? []Yes [] No     Problems? []Yes []No      Storing insulin correctly? []Yes [] No     Injecting at proper times? []Yes []No   IRisk Reduction - chronic complications:  Describe the relationship of blood glucose levels to long term complications of diabetes. Identify preventative measures & standards of care. Assessment Ratin23  NC  See Diabetes Assessment for last completed chronic complication prevention appointments. [] Patient is up-to-date on preventative exams and vaccines  [] Patient is not up-to date on preventative exams and vaccines and advised to discuss with PCM    [] Blood pressure is at goal  [] Blood pressure is not at goal    [] Cholesterol is at goal  [] Cholesterol is not at goal    [] Has microvascular complications  [] Has macrovascular complications  Kiley Linder RN   [] Reviewed natural course of T2DM with group and how chronic complications are related to elevated blood glucose. [] Discussed macro and microvascular complications and the need for control of lipids, blood pressure, glucose levels, weight reduction and smoking cessation to help reduce risks. [] Instructed on goals for blood pressure, lipids, and glucose for optimal health. [] Individualized scorecard provided with current   health maintenance recommendations from the American Diabetes Association to include Eye, dental exams, foot exams, recommended labs and vaccines for people with diabetes. Evaluation rating:    Using Score card? [] Yes []No    Blood Pressure at goal?  [] Yes []No    Foot exams:  self-exams daily  []Yes []No  Provider yearly   []  Yes []No               Eye and dental exams up to date?   []Yes []No               Labs completed & at goal [] Yes []No        Plan if not at goal? []Yes []No  Immunizations   [] Flu   []pneumonia   []Hep B (19-59)   []Covid   Individualized goal selection. 02/01/23  My goal , to help me improve my health, I will:     1.      2.    Shayy Coyle RN Percentage of goal completed from Initial Assessment:   %      2.    %    New revised goal: Percentage of goal completed:  1.      2.    New revised goal: Percentage of goal completed since end of class:  1.      2.    New revised goal:     Plan  Follow-up Appointments planned via phone call       Instruction Method: [x]Lecture/Discussion  []Power Point Presentation  [x]Handouts  []Return Demonstration      Education Materials/Equipment Provided:      [x] Self-Management  - Initial Assessment - Where do I Begin booklet and Counting Carbs from the ADA. [] Self-Management  Class 1 - On the Road to better managing your diabetes - Packet of Handouts from Diabetes Department    [] Self-Management  Class 2 - Meal Plan,  Choose your Foods - Food Lists for [employer] and Nutrition in the [employer] Resources - fast facts about fast food    [] Self-Management  Class 3 -  Diabetes ID card,  foot care tips sheet,  Continuing Your Journey with Diabetes, Individualized Diabetes report card, Sick Day Rules, Diabetes Cookbooks, Magazines and Pedometers when available    [] Glucose Meter     [] BD Getting Started Insulin Kit     [] Other      Encounter Type Date Start Time End Time Comments No Show Dates   Assessment 02/01/23  Shayy Coyle RN 2pm 230pm 02/01/23    [] Patient has no barriers to learning and recommend attending classes. Classes scheduled for:     [] Patient has the following barriers to learning and would benefit from additional education in an individual setting. Barriers:      [] Will follow up:    [] Patient declines classes at this time.       [] Will follow up:    Shayy Coyle RN    Class 1 - Understanding diabetes      [] First class completed today.      Class 2- Nutrition and diabetes        [] Second class completed today    Class 3 - Preventing Complications     [] Third class completed today. [] Patient has completed all 3 classes today. Goal sheets and DSMS Support Plan completed. Will follow up in 3-4 months via telephone. Patient advised to contact Diabetes Education office if any questions. Number provided. [] Patient needs to attend Class #    in order to complete classes. Scheduled for: Individual MNT         Follow-up 2/13/2023  Álvaro Herbert RN       []In Person  [x]Telephone  Follow up call to [facility], he now has an insulin pump and is not taking any sq injections. His blood sugars have been 172 -219 at the highest. He has some appts and asks for a call back in a week. He is feeling much better and has been out a few times, has more energy. Meter Instrx        Insulin Instrx      []Pen  []Vial & Syringe      DSMS Support Plan:  Follow-up plan:    [] Healthy Coping  [] Emotional Support  [] National [place] on Mental Illness (CRISS) - (Depression, bipolar and other support) 242.230.1219; www.criss.org    [] [address] [place] [address]  322.944.5641; www.dbsalliance. org                          [] National Suicide Prevention Ucvtpdpk-190-473-8255                          [] Anxiety & Depression Association of [place]                               Find local support groups by zip code at [facility] number 100-621-5534                          [] Counseling:  ______________________________  [] Diabetes Support Groups  [] New Weigh of Life at [facility]  2nd Tuesday of the month at 10:00 [phone]  [] On-line support groups (StylePuzzle, [employer].R[employer]le. [employer], ADA)  [] [facility]  [] I would be interested in a support group at [facility] in [place] if offered    [] Stress Relief     [] Yoga Class     [] Start a journal     [] Relaxation techniques     [] Axtdlcz2Rjlks- Valeria         [] SparkQuote (Free, inspiring quote of the day)    []  Healthy Eating  [] Weight Management & Carb Counting  [] Weight Pythnmuh-195-243-6000; www.weightwatchers. [employer]  [] Over Eaters Atisrfaxj-953-845-2664 (support group)- www.Sanivation. org  [] Follow up individual appointment with [facility] dietitian - call [phone]  [] Food Tracking Apps - My Fitness Pal, Padmini Nettle  [] ADA website - [address], weight loss  [] Myplate. com      []   Monitoring  [] Glucose Sylvester (Free, tracks blood glucose, graphs)  [] MySugrApp (Basic and Pro patterns)  [] Diabetes:M - logbook    []  Being Active  [] 24 Hour Mixdruv-843-890-0240; www.RewardsPay  [] Ryan Adame. Enhanced Medical Decisions  [] [address]  [] Fit Walks: FREE  [employer] in [place] on Mondays 5:30 pm  [facility] in [place] (for weather)   [place] on Thursdays at 5:30 [phone]  [] Gudelia Cousin walking videos (Some free on youtube)  [] Other Exercise Plan/Facility _____________________________________________  [] Saniya Mcardle to 5K    []  Reducing Risk  [] Make a sick day toolkit  [] Schedule appointments for eye, dentist  [] Stop smoking              [] Monitor Blood pressure              [] Get vaccinated                [] Other:___________________      []    Taking Medications  [] Valeria: MyTherapyApp - helps track meds  [] Seek financial help with medications:  [] Non-insulin-agents-cost-saving-resource-7-29-19. pdf   [employer]. org/docs/default-source/practice/educator-tools/non-insulin-agents-cost-saving-resource-7-29-19. pdf?sfvrsn=2  [] Insulin cost saving resource  [employer]gl. org/docs/default-source/practice/educator-tools/insulin-cost-saving-resources-3-4-19. pdf?sfvrsn=4  []  GetInsulin. org  []  The Affordable insulin Project http://affordableinsulinproject.org/    []  Diabetes Websites - Use as a resource for additional information  [] www.diabetes. org  [] My.Network. [employer]  [] WWW.diabeteseducator. org     Association of Diabetes Care & Education Specialists  [] www.niddk.nih.gov/health-information/diabetes/overview  Professor Ham [address]  [] www.medicalert. org  - offers emergency medical information service, including an ID bracelet/pendant (have to pay)    []   Journals/Magazines  [] Diabetes Forecast 6-231-831-9807; www.diabetesforecast.org  [] Diabetes Self-Management 9-764.322.3573; www.diabetesselNtractive. [employer]  [] Diabetes Health 748-479-9650; www.diabeteshealth. com    []   Other  [] Health Program offered at place of employment   [] Insurance Company's Chronic Disease Management Program  [] Follow up - Diabetes Education or Nutrition Therapy Annually (call in 1 year to set up apt)  [] Join the Living with Type 2 Diabetes Program (FREE)   www.diabetes. org/diabetes/type-2/living-with-type-2-diabetes-program  or call 1-800-DIABETES  [] Stop smoking - www.cancer. org/healthy/stay-away-from-tobacco       Post Education Referrals:        [] [place] Tobacco Quit information sheet and [address] [facility] , [address] [facility]      [] Dental care     [] Podiatrist     [] Ophthalmologist     [] Other    Lynnette Morejon RN

## 2023-02-20 ENCOUNTER — TELEPHONE (OUTPATIENT)
Dept: DIABETES SERVICES | Age: 63
End: 2023-02-20

## 2023-03-08 ENCOUNTER — TELEPHONE (OUTPATIENT)
Dept: DIABETES SERVICES | Age: 63
End: 2023-03-08

## 2023-03-09 ENCOUNTER — APPOINTMENT (OUTPATIENT)
Dept: DIABETES SERVICES | Age: 63
End: 2023-03-09
Payer: COMMERCIAL

## 2023-03-23 ENCOUNTER — HOSPITAL ENCOUNTER (OUTPATIENT)
Dept: DIABETES SERVICES | Age: 63
Setting detail: THERAPIES SERIES
Discharge: HOME | End: 2023-03-23
Payer: COMMERCIAL

## 2023-03-23 PROCEDURE — G0108 DIAB MANAGE TRN  PER INDIV: HCPCS

## 2023-03-23 NOTE — PROGRESS NOTES
Nutrition and diabetes        [] Second class completed today    Class 3 - Preventing Complications     [] Third class completed today. [] Patient has completed all 3 classes today. Goal sheets and DSMS Support Plan completed. Will follow up in 3-4 months via telephone. Patient advised to contact Diabetes Education office if any questions. Number provided. [] Patient needs to attend Class #    in order to complete classes. Scheduled for: Individual MNT         Follow-up 3/23/2023  Jeff Jo RN   11am 12pm   [x]In Person  []Telephone  Dalila Goel is present to finish is initial assessment that was interrupted by a trip to the ED. Dalila Goel is now using the Medtronic system with CGM and insulin pump. His blood sugar this am was 85. He reports they generally run between 70 and 100 , before meals and fasting. I instruct on s/s hypoglycemia and hyperglycemia and treatments. I instruct on carbs 60-75 grams per meal and 15 gram snack and site examples based on what he's eating, instruct on label reading . He is doing well I will call him in 2-3 weeks to follow up. Meter Instrx        Insulin Instrx      []Pen  []Vial & Syringe      DSMS Support Plan:  Follow-up plan:    [] Healthy Coping  [] Emotional Support  [] National [employer] on Mental Illness (CRISS) - (Depression, bipolar and other support) 419.802.4327; www.criss.org    [] [address] [place] [address]  529.113.8336; www.dbsalliance. org                          [] National Suicide Prevention Upfgpdvj-999-033-8255                          [] Anxiety & Depression Association of [place]                               Find local support groups by zip code at [facility] number [phone]                          [] Counseling:  ______________________________  [] Diabetes Support Groups  [] New Weigh of Life at [facility]  2nd Tuesday of the month at 10:00 [phone]  []

## 2023-05-09 ENCOUNTER — HOSPITAL ENCOUNTER (OUTPATIENT)
Dept: DIABETES SERVICES | Age: 63
Setting detail: THERAPIES SERIES
Discharge: HOME | End: 2023-05-09
Payer: MEDICAID

## 2023-05-09 PROCEDURE — G0108 DIAB MANAGE TRN  PER INDIV: HCPCS

## 2023-05-09 NOTE — PROGRESS NOTES
and time  []Running  checks on the test strips using control solution   []Loading and using lancet device to obtain an adequate sample  []Obtaining blood sample and reading results on meter  []Proper disposal of used strips and lancets  []Frequency of testing  Importance of record keeping   []When to call their PCM with abnormal results  []Desired blood glucose goals for optimal control - handout given  [] All of the above    [] Patient instructed on home meter. Name of meter:    [] Patient instructed with demonstrator model in office. The patient return demonstrated   [] Verbally   [] Using his own meter:        [] Self-tested Bg:_____    Patient tested in the office today, he was \"Hi\" at home and took his insulin . Checked here today and he was \"Hi\" on our meter. Call to Dr Florian Neumann office , he instructs patient to go to the ED. [facility] Patient taken to the ED via wheelchair and left at registration desk, his ride , Filomena Martinez was notified. Garland Tracy RN   See above  Evaluation rating:    Cleaning hands before testing? []Yes   []No    Using sides of fingers, not pads? []Yes   []No    Using  checks. []Yes   []No    Logging resuts? []Yes   []No   Risk Reduction - acute complications:  Identify symptoms of hyper & hypoglycemia, and prevention & treatment strategies. Assessment Ratin  23  [] Knowledgeable  [] Limited Knowledge  [] No knowledge   of hypo or hyperglycemia. [] Handouts reviewed covering symptoms and treatment for both. (includes Rule of 15)    Patient has []low []high risk for hypoglycemia. [] Advised to carry source of fast acting glucose at all times. []  Advised to carry ID on person identifying as having diabetes  Garland Tarcy RN   [] Reviewed signs and symptoms of acute complications of diabetes, (hypoglycemia and hyperglycemia), possible causes and actions to take if occurs.      [] Reviewed BG guidelines and troubleshooting unexpected

## 2023-05-25 ENCOUNTER — OFFICE VISIT (OUTPATIENT)
Dept: ENDOCRINOLOGY | Age: 63
End: 2023-05-25
Payer: COMMERCIAL

## 2023-05-25 VITALS
DIASTOLIC BLOOD PRESSURE: 70 MMHG | BODY MASS INDEX: 31.08 KG/M2 | HEIGHT: 67 IN | SYSTOLIC BLOOD PRESSURE: 122 MMHG | HEART RATE: 91 BPM | OXYGEN SATURATION: 93 % | WEIGHT: 198 LBS

## 2023-05-25 DIAGNOSIS — E10.65 POOR CONTROL TYPE I DIABETES MELLITUS (HCC): ICD-10-CM

## 2023-05-25 DIAGNOSIS — E10.65 POOR CONTROL TYPE I DIABETES MELLITUS (HCC): Primary | ICD-10-CM

## 2023-05-25 LAB
ANION GAP SERPL CALCULATED.3IONS-SCNC: 12 MEQ/L (ref 9–15)
BUN SERPL-MCNC: 17 MG/DL (ref 8–23)
CALCIUM SERPL-MCNC: 9.4 MG/DL (ref 8.5–9.9)
CHLORIDE SERPL-SCNC: 103 MEQ/L (ref 95–107)
CHOLEST SERPL-MCNC: 141 MG/DL (ref 0–199)
CO2 SERPL-SCNC: 26 MEQ/L (ref 20–31)
CREAT SERPL-MCNC: 1.09 MG/DL (ref 0.7–1.2)
GLUCOSE SERPL-MCNC: 83 MG/DL (ref 70–99)
HDLC SERPL-MCNC: 35 MG/DL (ref 40–59)
LDLC SERPL CALC-MCNC: 69 MG/DL (ref 0–129)
POTASSIUM SERPL-SCNC: 4.2 MEQ/L (ref 3.4–4.9)
SODIUM SERPL-SCNC: 141 MEQ/L (ref 135–144)
TRIGL SERPL-MCNC: 186 MG/DL (ref 0–150)

## 2023-05-25 PROCEDURE — 82962 GLUCOSE BLOOD TEST: CPT | Performed by: INTERNAL MEDICINE

## 2023-05-25 PROCEDURE — 3044F HG A1C LEVEL LT 7.0%: CPT | Performed by: INTERNAL MEDICINE

## 2023-05-25 PROCEDURE — 99213 OFFICE O/P EST LOW 20 MIN: CPT | Performed by: INTERNAL MEDICINE

## 2023-05-25 RX ORDER — BLOOD SUGAR DIAGNOSTIC
STRIP MISCELLANEOUS
Qty: 150 EACH | Refills: 3 | Status: SHIPPED | OUTPATIENT
Start: 2023-05-25

## 2023-05-25 RX ORDER — LANCETS
EACH MISCELLANEOUS
Qty: 150 EACH | Refills: 3 | Status: SHIPPED | OUTPATIENT
Start: 2023-05-25

## 2023-05-26 LAB — HBA1C MFR BLD: 5.4 % (ref 4.8–5.9)

## 2023-05-29 ASSESSMENT — ENCOUNTER SYMPTOMS: EYES NEGATIVE: 1

## 2023-11-16 ENCOUNTER — OFFICE VISIT (OUTPATIENT)
Dept: ENDOCRINOLOGY | Age: 63
End: 2023-11-16

## 2023-11-16 VITALS
BODY MASS INDEX: 31.39 KG/M2 | HEART RATE: 97 BPM | WEIGHT: 200 LBS | HEIGHT: 67 IN | SYSTOLIC BLOOD PRESSURE: 123 MMHG | DIASTOLIC BLOOD PRESSURE: 79 MMHG | OXYGEN SATURATION: 93 %

## 2023-11-16 DIAGNOSIS — E10.10 TYPE 1 DIABETES MELLITUS WITH KETOACIDOSIS WITHOUT COMA (HCC): Primary | ICD-10-CM

## 2023-11-16 DIAGNOSIS — E10.65 POOR CONTROL TYPE I DIABETES MELLITUS (HCC): ICD-10-CM

## 2023-11-16 LAB
ANION GAP SERPL CALCULATED.3IONS-SCNC: 9 MEQ/L (ref 9–15)
BUN SERPL-MCNC: 14 MG/DL (ref 8–23)
CALCIUM SERPL-MCNC: 9.4 MG/DL (ref 8.5–9.9)
CHLORIDE SERPL-SCNC: 103 MEQ/L (ref 95–107)
CO2 SERPL-SCNC: 26 MEQ/L (ref 20–31)
CREAT SERPL-MCNC: 0.99 MG/DL (ref 0.7–1.2)
CREAT UR-MCNC: 187.3 MG/DL
GLUCOSE SERPL-MCNC: 125 MG/DL (ref 70–99)
HBA1C MFR BLD: 5.3 % (ref 4.8–5.9)
MICROALBUMIN UR-MCNC: <1.2 MG/DL
MICROALBUMIN/CREAT UR-RTO: NORMAL MG/G (ref 0–30)
POTASSIUM SERPL-SCNC: 4.2 MEQ/L (ref 3.4–4.9)
SODIUM SERPL-SCNC: 138 MEQ/L (ref 135–144)

## 2023-11-16 PROCEDURE — 3044F HG A1C LEVEL LT 7.0%: CPT | Performed by: INTERNAL MEDICINE

## 2023-11-16 PROCEDURE — 99213 OFFICE O/P EST LOW 20 MIN: CPT | Performed by: INTERNAL MEDICINE

## 2023-11-16 RX ORDER — INSULIN LISPRO 100 [IU]/ML
INJECTION, SOLUTION INTRAVENOUS; SUBCUTANEOUS
COMMUNITY
Start: 2023-10-13

## 2023-11-16 NOTE — PROGRESS NOTES
11/16/2023    Assessment:       Diagnosis Orders   1. Type 1 diabetes mellitus with ketoacidosis without coma ([address])        2. Poor control type I diabetes mellitus (HCC)              PLAN:     Orders Placed This Encounter   Procedures    Basic Metabolic Panel     Standing Status:   Future     Standing Expiration Date:   11/17/2024    Hemoglobin A1C     Standing Status:   Future     Standing Expiration Date:   11/17/2024    Microalbumin / Creatinine Urine Ratio     Standing Status:   Future     Standing Expiration Date:   11/17/2024     Continue patient on insulin pump as per current pump setting      Subjective:     Chief Complaint   Patient presents with    Diabetes     Vitals:    11/16/23 1346   BP: 123/79   Pulse: 97   SpO2: 93%   Weight: 90.7 kg (200 lb)   Height: 1.702 m (5' 7.01\")     Wt Readings from Last 3 Encounters:   11/16/23 90.7 kg (200 lb)   05/25/23 89.8 kg (198 lb)   02/03/23 78.9 kg (174 lb)     BP Readings from Last 3 Encounters:   11/16/23 123/79   05/25/23 122/70   02/03/23 105/71     Follow-up type 1 diabetes had labs done today hemoglobin A1c was 5.3 has been taking mostly basal insulin does not take boluses denies any severe hypoglycemia has been back in regular touch with [employer] representative  Hemoglobin A1C       Date                     Value               Ref Range           Status                11/16/2023               5.3                 4.8 - 5.9 %         Final            ----------  Chemistries reviewed stable  Basal rate 1.4 units/h 15 units preset bolus    Diabetes  He presents for his follow-up diabetic visit. He has type 1 diabetes mellitus. His disease course has been stable. There are no hypoglycemic associated symptoms. Pertinent negatives for diabetes include no polydipsia and no polyuria. There are no diabetic complications. Risk factors for coronary artery disease include obesity. Current diabetic treatment includes insulin pump.  He is following a generally healthy

## 2024-06-06 RX ORDER — INSULIN LISPRO 100 [IU]/ML
INJECTION, SOLUTION INTRAVENOUS; SUBCUTANEOUS
Qty: 60 ML | Refills: 3 | Status: SHIPPED | OUTPATIENT
Start: 2024-06-06

## 2024-06-13 DIAGNOSIS — E10.10 TYPE 1 DIABETES MELLITUS WITH KETOACIDOSIS WITHOUT COMA (HCC): ICD-10-CM

## 2024-06-13 LAB
ANION GAP SERPL CALCULATED.3IONS-SCNC: 9 MEQ/L (ref 9–15)
BUN SERPL-MCNC: 12 MG/DL (ref 8–23)
CALCIUM SERPL-MCNC: 9.5 MG/DL (ref 8.5–9.9)
CHLORIDE SERPL-SCNC: 100 MEQ/L (ref 95–107)
CO2 SERPL-SCNC: 28 MEQ/L (ref 20–31)
CREAT SERPL-MCNC: 0.93 MG/DL (ref 0.7–1.2)
CREAT UR-MCNC: 260.1 MG/DL
ESTIMATED AVERAGE GLUCOSE: 103 MG/DL
GLUCOSE SERPL-MCNC: 100 MG/DL (ref 70–99)
HBA1C MFR BLD: 5.2 % (ref 4–6)
MICROALBUMIN UR-MCNC: <1.2 MG/DL
MICROALBUMIN/CREAT UR-RTO: NORMAL MG/G (ref 0–30)
POTASSIUM SERPL-SCNC: 4.6 MEQ/L (ref 3.4–4.9)
SODIUM SERPL-SCNC: 137 MEQ/L (ref 135–144)

## 2024-06-20 ENCOUNTER — OFFICE VISIT (OUTPATIENT)
Dept: ENDOCRINOLOGY | Age: 64
End: 2024-06-20

## 2024-06-20 VITALS
HEART RATE: 84 BPM | HEIGHT: 67 IN | DIASTOLIC BLOOD PRESSURE: 66 MMHG | OXYGEN SATURATION: 93 % | SYSTOLIC BLOOD PRESSURE: 106 MMHG | BODY MASS INDEX: 31.39 KG/M2 | WEIGHT: 200 LBS

## 2024-06-20 DIAGNOSIS — E10.65 POOR CONTROL TYPE I DIABETES MELLITUS (HCC): Primary | ICD-10-CM

## 2024-06-20 DIAGNOSIS — Z96.41 INSULIN PUMP IN PLACE: ICD-10-CM

## 2024-06-20 PROCEDURE — 99213 OFFICE O/P EST LOW 20 MIN: CPT | Performed by: INTERNAL MEDICINE

## 2024-06-20 PROCEDURE — 3044F HG A1C LEVEL LT 7.0%: CPT | Performed by: INTERNAL MEDICINE

## 2024-06-20 RX ORDER — INSULIN LISPRO 100 [IU]/ML
INJECTION, SOLUTION INTRAVENOUS; SUBCUTANEOUS
Qty: 60 ML | Refills: 3 | Status: SHIPPED | OUTPATIENT
Start: 2024-06-20

## 2024-06-20 NOTE — PROGRESS NOTES
6/20/2024    Assessment:       Diagnosis Orders   1. Poor control type I diabetes mellitus (HCC)  Lipid Panel    Hemoglobin A1C    Basic Metabolic Panel      2. Insulin pump in place              PLAN:         Orders Placed This Encounter   Procedures    Lipid Panel     Standing Status:   Future     Standing Expiration Date:   6/20/2025    Hemoglobin A1C     Standing Status:   Future     Standing Expiration Date:   6/20/2025    Basic Metabolic Panel     Standing Status:   Future     Standing Expiration Date:   6/20/2025     Orders Placed This Encounter   Medications    HUMALOG 100 UNIT/ML SOLN injection vial     Sig: USE VIA INSULIN PUMP . MAX DOSE  UNITS     Dispense:  60 mL     Refill:  3     Continue patient on Humalog via pump as per current pump setting  Follow-up in 3 to 6 months    Subjective:     Chief Complaint   Patient presents with    Diabetes    insulin pump     Medtronic 780g 6-20-24     Vitals:    06/20/24 1310   BP: 106/66   Pulse: 84   SpO2: 93%   Weight: 90.7 kg (200 lb)   Height: 1.702 m (5' 7.01\")     Wt Readings from Last 3 Encounters:   06/20/24 90.7 kg (200 lb)   11/16/23 90.7 kg (200 lb)   05/25/23 89.8 kg (198 lb)     BP Readings from Last 3 Encounters:   06/20/24 106/66   11/16/23 123/79   05/25/23 122/70           Follow-up on type 1 diabetes patient's overall control has improved using Medtronic 780 pump with sensor continuous glucose monitoring rarely takes boluses due to increased activity denies any severe hypoglycemia  Current basal rate is 1.4 units/h  Hemoglobin A1C       Date                     Value               Ref Range           Status                06/13/2024               5.2                 4.0 - 6.0 %         Final            ----------      Diabetes  He presents for his follow-up diabetic visit. He has type 1 diabetes mellitus. Pertinent negatives for diabetes include no polydipsia and no polyuria. There are no hypoglycemic complications. Current diabetic

## 2025-05-05 DIAGNOSIS — E10.65 POOR CONTROL TYPE I DIABETES MELLITUS (HCC): ICD-10-CM

## 2025-05-05 LAB
ANION GAP SERPL CALCULATED.3IONS-SCNC: 13 MEQ/L (ref 9–15)
BUN SERPL-MCNC: 13 MG/DL (ref 8–23)
CALCIUM SERPL-MCNC: 8.8 MG/DL (ref 8.5–9.9)
CHLORIDE SERPL-SCNC: 96 MEQ/L (ref 95–107)
CHOLEST SERPL-MCNC: 169 MG/DL (ref 0–199)
CO2 SERPL-SCNC: 25 MEQ/L (ref 20–31)
CREAT SERPL-MCNC: 0.99 MG/DL (ref 0.7–1.2)
GLUCOSE SERPL-MCNC: 391 MG/DL (ref 70–99)
HDLC SERPL-MCNC: 33 MG/DL (ref 40–59)
LDLC SERPL CALC-MCNC: 68 MG/DL (ref 0–129)
POTASSIUM SERPL-SCNC: 4.2 MEQ/L (ref 3.4–4.9)
SODIUM SERPL-SCNC: 134 MEQ/L (ref 135–144)
TRIGL SERPL-MCNC: 342 MG/DL (ref 0–150)

## 2025-05-06 LAB
ESTIMATED AVERAGE GLUCOSE: 169 MG/DL
HBA1C MFR BLD: 7.5 % (ref 4–6)

## 2025-07-10 ENCOUNTER — OFFICE VISIT (OUTPATIENT)
Age: 65
End: 2025-07-10
Payer: MEDICAID

## 2025-07-10 VITALS
HEIGHT: 67 IN | WEIGHT: 199 LBS | SYSTOLIC BLOOD PRESSURE: 102 MMHG | HEART RATE: 86 BPM | BODY MASS INDEX: 31.23 KG/M2 | DIASTOLIC BLOOD PRESSURE: 75 MMHG

## 2025-07-10 DIAGNOSIS — E78.1 HYPERTRIGLYCERIDEMIA: ICD-10-CM

## 2025-07-10 DIAGNOSIS — Z96.41 INSULIN PUMP IN PLACE: ICD-10-CM

## 2025-07-10 DIAGNOSIS — E10.65 POOR CONTROL TYPE I DIABETES MELLITUS (HCC): Primary | ICD-10-CM

## 2025-07-10 LAB
CHP ED QC CHECK: ABNORMAL
GLUCOSE BLD-MCNC: 335 MG/DL

## 2025-07-10 PROCEDURE — 3051F HG A1C>EQUAL 7.0%<8.0%: CPT | Performed by: INTERNAL MEDICINE

## 2025-07-10 PROCEDURE — PBSHW POCT GLUCOSE: Performed by: INTERNAL MEDICINE

## 2025-07-10 PROCEDURE — 82962 GLUCOSE BLOOD TEST: CPT | Performed by: INTERNAL MEDICINE

## 2025-07-10 PROCEDURE — 99213 OFFICE O/P EST LOW 20 MIN: CPT | Performed by: INTERNAL MEDICINE

## 2025-07-10 PROCEDURE — 99214 OFFICE O/P EST MOD 30 MIN: CPT | Performed by: INTERNAL MEDICINE

## 2025-07-10 RX ORDER — INSULIN LISPRO 100 [IU]/ML
INJECTION, SOLUTION INTRAVENOUS; SUBCUTANEOUS
Qty: 60 ML | Refills: 3 | Status: SHIPPED | OUTPATIENT
Start: 2025-07-10

## 2025-07-10 RX ORDER — OMEGA-3-ACID ETHYL ESTERS 1 G/1
2 CAPSULE, LIQUID FILLED ORAL 2 TIMES DAILY
Qty: 60 CAPSULE | Refills: 3 | Status: SHIPPED | OUTPATIENT
Start: 2025-07-10

## 2025-07-10 NOTE — PROGRESS NOTES
05/05/2025    CREATININE 0.99 05/05/2025    GLUCOSE 391 (H) 05/05/2025    CALCIUM 8.8 05/05/2025    BILITOT <0.2 02/01/2023    ALKPHOS 96 02/01/2023    AST 18 02/01/2023    ALT 14 02/01/2023    LABGLOM 84.8 05/05/2025    GLOB 4.8 (H) 02/01/2023     Lab Results   Component Value Date    WBC 14.3 (H) 02/01/2023    HGB 14.3 02/01/2023    HCT 39.9 (L) 02/01/2023    MCV 79.4 02/01/2023     (H) 02/01/2023     Lab Results   Component Value Date    LABA1C 7.5 (H) 05/05/2025    LABA1C 5.2 06/13/2024    LABA1C 5.3 11/16/2023     Lab Results   Component Value Date    HDL 33 (L) 05/05/2025    HDL 35 (L) 05/25/2023    CHOL 169 05/05/2025    CHOL 141 05/25/2023    TRIG 342 (H) 05/05/2025    TRIG 186 (H) 05/25/2023     No results found for: \"TESTM\"  No results found for: \"TSH\", \"FT3\", \"T4FREE\"  No results found for: \"TPOABS\"    Review of Systems   Endocrine: Negative for polydipsia and polyuria.       Objective:   Physical Exam  Vitals reviewed.   Constitutional:       General: He is not in acute distress.     Appearance: Normal appearance. He is obese.   HENT:      Head: Normocephalic and atraumatic.      Right Ear: External ear normal.      Left Ear: External ear normal.      Nose: Nose normal.   Eyes:      General: No scleral icterus.        Right eye: No discharge.         Left eye: No discharge.      Extraocular Movements: Extraocular movements intact.      Conjunctiva/sclera: Conjunctivae normal.   Cardiovascular:      Rate and Rhythm: Normal rate.   Pulmonary:      Effort: Pulmonary effort is normal.   Musculoskeletal:         General: Normal range of motion.      Cervical back: Normal range of motion and neck supple.      Right lower leg: No edema.      Left lower leg: No edema.   Skin:     Findings: No lesion or rash.   Neurological:      General: No focal deficit present.      Mental Status: He is alert and oriented to person, place, and time.   Psychiatric:         Mood and Affect: Mood normal.         Behavior:

## 2025-08-28 ENCOUNTER — APPOINTMENT (OUTPATIENT)
Dept: GENERAL RADIOLOGY | Age: 65
End: 2025-08-28
Payer: MEDICAID

## 2025-08-28 LAB
ALBUMIN SERPL-MCNC: 4.3 G/DL (ref 3.5–4.6)
ALP SERPL-CCNC: 113 U/L (ref 35–104)
ALT SERPL-CCNC: 29 U/L (ref 0–41)
ANION GAP SERPL CALCULATED.3IONS-SCNC: 17 MEQ/L (ref 9–15)
AST SERPL-CCNC: 23 U/L (ref 0–40)
B PARAP IS1001 DNA NPH QL NAA+NON-PROBE: NOT DETECTED
B PERT.PT PRMT NPH QL NAA+NON-PROBE: NOT DETECTED
BASOPHILS # BLD: 0 K/UL (ref 0–0.2)
BASOPHILS NFR BLD: 0.4 %
BILIRUB SERPL-MCNC: 1 MG/DL (ref 0.2–0.7)
BUN SERPL-MCNC: 11 MG/DL (ref 8–23)
C PNEUM DNA NPH QL NAA+NON-PROBE: NOT DETECTED
CALCIUM SERPL-MCNC: 9.2 MG/DL (ref 8.5–9.9)
CHLORIDE SERPL-SCNC: 98 MEQ/L (ref 95–107)
CHP ED QC CHECK: NORMAL
CO2 SERPL-SCNC: 22 MEQ/L (ref 20–31)
CREAT SERPL-MCNC: 0.99 MG/DL (ref 0.7–1.2)
EOSINOPHIL # BLD: 0.2 K/UL (ref 0–0.7)
EOSINOPHIL NFR BLD: 1 %
ERYTHROCYTE [DISTWIDTH] IN BLOOD BY AUTOMATED COUNT: 12.4 % (ref 11.5–14.5)
FLUAV RNA NPH QL NAA+NON-PROBE: NOT DETECTED
FLUBV RNA NPH QL NAA+NON-PROBE: NOT DETECTED
GLOBULIN SER CALC-MCNC: 4.5 G/DL (ref 2.3–3.5)
GLUCOSE BLD-MCNC: 87 MG/DL
GLUCOSE BLD-MCNC: 87 MG/DL (ref 70–99)
GLUCOSE SERPL-MCNC: 80 MG/DL (ref 70–99)
HADV DNA NPH QL NAA+NON-PROBE: NOT DETECTED
HCOV 229E RNA NPH QL NAA+NON-PROBE: NOT DETECTED
HCOV HKU1 RNA NPH QL NAA+NON-PROBE: NOT DETECTED
HCOV NL63 RNA NPH QL NAA+NON-PROBE: NOT DETECTED
HCOV OC43 RNA NPH QL NAA+NON-PROBE: NOT DETECTED
HCT VFR BLD AUTO: 42.8 % (ref 42–52)
HGB BLD-MCNC: 14.7 G/DL (ref 14–18)
HMPV RNA NPH QL NAA+NON-PROBE: NOT DETECTED
HPIV1 RNA NPH QL NAA+NON-PROBE: NOT DETECTED
HPIV2 RNA NPH QL NAA+NON-PROBE: NOT DETECTED
HPIV3 RNA NPH QL NAA+NON-PROBE: NOT DETECTED
HPIV4 RNA NPH QL NAA+NON-PROBE: NOT DETECTED
LACTIC ACID, SEPSIS: 1.2 MMOL/L (ref 0.5–1.9)
LYMPHOCYTES # BLD: 5.3 K/UL (ref 1–4.8)
LYMPHOCYTES NFR BLD: 27 %
M PNEUMO DNA NPH QL NAA+NON-PROBE: NOT DETECTED
MCH RBC QN AUTO: 26.7 PG (ref 27–31.3)
MCHC RBC AUTO-ENTMCNC: 34.3 % (ref 33–37)
MCV RBC AUTO: 77.8 FL (ref 79–92.2)
MONOCYTES # BLD: 2 K/UL (ref 0.2–0.8)
MONOCYTES NFR BLD: 10.6 %
NEUTROPHILS # BLD: 10.5 K/UL (ref 1.4–6.5)
NEUTS SEG NFR BLD: 59 %
NEUTS VAC BLD QL SMEAR: ABNORMAL
PERFORMED ON: NORMAL
PLATELET # BLD AUTO: 391 K/UL (ref 130–400)
PLATELET BLD QL SMEAR: ADEQUATE
POIKILOCYTOSIS BLD QL SMEAR: ABNORMAL
POLYCHROMASIA BLD QL SMEAR: ABNORMAL
POTASSIUM SERPL-SCNC: 4.1 MEQ/L (ref 3.4–4.9)
PROCALCITONIN SERPL IA-MCNC: 0.09 NG/ML (ref 0–0.15)
PROT SERPL-MCNC: 8.8 G/DL (ref 6.3–8)
RBC # BLD AUTO: 5.5 M/UL (ref 4.7–6.1)
RSV RNA NPH QL NAA+NON-PROBE: NOT DETECTED
RV+EV RNA NPH QL NAA+NON-PROBE: NOT DETECTED
SARS-COV-2 RNA NPH QL NAA+NON-PROBE: NOT DETECTED
SLIDE REVIEW: ABNORMAL
SMUDGE CELLS BLD QL SMEAR: 4.8
SODIUM SERPL-SCNC: 137 MEQ/L (ref 135–144)
STREP GRP A PCR: POSITIVE
VARIANT LYMPHS NFR BLD: 3 %
WBC # BLD AUTO: 17.8 K/UL (ref 4.8–10.8)

## 2025-08-28 PROCEDURE — 87040 BLOOD CULTURE FOR BACTERIA: CPT

## 2025-08-28 PROCEDURE — 96372 THER/PROPH/DIAG INJ SC/IM: CPT

## 2025-08-28 PROCEDURE — 99284 EMERGENCY DEPT VISIT MOD MDM: CPT

## 2025-08-28 PROCEDURE — 83605 ASSAY OF LACTIC ACID: CPT

## 2025-08-28 PROCEDURE — 87651 STREP A DNA AMP PROBE: CPT

## 2025-08-28 PROCEDURE — 84145 PROCALCITONIN (PCT): CPT

## 2025-08-28 PROCEDURE — 6370000000 HC RX 637 (ALT 250 FOR IP): Performed by: EMERGENCY MEDICINE

## 2025-08-28 PROCEDURE — 71046 X-RAY EXAM CHEST 2 VIEWS: CPT

## 2025-08-28 PROCEDURE — 6360000002 HC RX W HCPCS: Performed by: EMERGENCY MEDICINE

## 2025-08-28 PROCEDURE — 0202U NFCT DS 22 TRGT SARS-COV-2: CPT

## 2025-08-28 PROCEDURE — 36415 COLL VENOUS BLD VENIPUNCTURE: CPT

## 2025-08-28 PROCEDURE — 80053 COMPREHEN METABOLIC PANEL: CPT

## 2025-08-28 PROCEDURE — 85025 COMPLETE CBC W/AUTO DIFF WBC: CPT

## 2025-08-28 RX ORDER — KETOROLAC TROMETHAMINE 30 MG/ML
30 INJECTION, SOLUTION INTRAMUSCULAR; INTRAVENOUS ONCE
Status: COMPLETED | OUTPATIENT
Start: 2025-08-28 | End: 2025-08-28

## 2025-08-28 RX ORDER — DEXAMETHASONE SODIUM PHOSPHATE 10 MG/ML
10 INJECTION, SOLUTION INTRA-ARTICULAR; INTRALESIONAL; INTRAMUSCULAR; INTRAVENOUS; SOFT TISSUE ONCE
Status: COMPLETED | OUTPATIENT
Start: 2025-08-28 | End: 2025-08-28

## 2025-08-28 RX ORDER — ACETAMINOPHEN 500 MG
1000 TABLET ORAL ONCE
Status: COMPLETED | OUTPATIENT
Start: 2025-08-28 | End: 2025-08-28

## 2025-08-28 RX ORDER — 0.9 % SODIUM CHLORIDE 0.9 %
1000 INTRAVENOUS SOLUTION INTRAVENOUS ONCE
Status: DISCONTINUED | OUTPATIENT
Start: 2025-08-28 | End: 2025-08-29 | Stop reason: HOSPADM

## 2025-08-28 RX ORDER — DEXAMETHASONE SODIUM PHOSPHATE 10 MG/ML
10 INJECTION, SOLUTION INTRA-ARTICULAR; INTRALESIONAL; INTRAMUSCULAR; INTRAVENOUS; SOFT TISSUE ONCE
Status: DISCONTINUED | OUTPATIENT
Start: 2025-08-28 | End: 2025-08-28

## 2025-08-28 RX ORDER — IBUPROFEN 400 MG/1
400 TABLET, FILM COATED ORAL ONCE
Status: DISCONTINUED | OUTPATIENT
Start: 2025-08-28 | End: 2025-08-28

## 2025-08-28 RX ORDER — KETOROLAC TROMETHAMINE 30 MG/ML
30 INJECTION, SOLUTION INTRAMUSCULAR; INTRAVENOUS ONCE
Status: DISCONTINUED | OUTPATIENT
Start: 2025-08-28 | End: 2025-08-28

## 2025-08-28 RX ADMIN — ACETAMINOPHEN 1000 MG: 500 TABLET ORAL at 21:01

## 2025-08-28 RX ADMIN — KETOROLAC TROMETHAMINE 30 MG: 30 INJECTION, SOLUTION INTRAMUSCULAR at 21:09

## 2025-08-28 RX ADMIN — DEXAMETHASONE SODIUM PHOSPHATE 10 MG: 10 INJECTION INTRAMUSCULAR; INTRAVENOUS at 21:10

## 2025-08-28 ASSESSMENT — LIFESTYLE VARIABLES
HOW OFTEN DO YOU HAVE A DRINK CONTAINING ALCOHOL: MONTHLY OR LESS
HOW MANY STANDARD DRINKS CONTAINING ALCOHOL DO YOU HAVE ON A TYPICAL DAY: 1 OR 2

## 2025-08-28 ASSESSMENT — PAIN DESCRIPTION - LOCATION: LOCATION: THROAT

## 2025-08-28 ASSESSMENT — PAIN - FUNCTIONAL ASSESSMENT: PAIN_FUNCTIONAL_ASSESSMENT: 0-10

## 2025-08-28 ASSESSMENT — PAIN SCALES - GENERAL: PAINLEVEL_OUTOF10: 0

## 2025-08-28 ASSESSMENT — PAIN DESCRIPTION - PAIN TYPE: TYPE: ACUTE PAIN

## 2025-08-29 ENCOUNTER — HOSPITAL ENCOUNTER (EMERGENCY)
Age: 65
Discharge: HOME | End: 2025-08-29
Payer: MEDICAID

## 2025-08-29 ENCOUNTER — APPOINTMENT (OUTPATIENT)
Dept: CT IMAGING | Age: 65
End: 2025-08-29
Payer: MEDICAID

## 2025-08-29 VITALS
OXYGEN SATURATION: 94 % | HEART RATE: 94 BPM | HEIGHT: 68 IN | BODY MASS INDEX: 29.55 KG/M2 | WEIGHT: 195 LBS | RESPIRATION RATE: 20 BRPM | DIASTOLIC BLOOD PRESSURE: 81 MMHG | SYSTOLIC BLOOD PRESSURE: 129 MMHG | TEMPERATURE: 98.5 F

## 2025-08-29 DIAGNOSIS — J02.0 STREP PHARYNGITIS: Primary | ICD-10-CM

## 2025-08-29 DIAGNOSIS — D72.829 LEUKOCYTOSIS, UNSPECIFIED TYPE: ICD-10-CM

## 2025-08-29 PROCEDURE — 6370000000 HC RX 637 (ALT 250 FOR IP)

## 2025-08-29 RX ORDER — IOPAMIDOL 755 MG/ML
100 INJECTION, SOLUTION INTRAVASCULAR
Status: DISCONTINUED | OUTPATIENT
Start: 2025-08-29 | End: 2025-08-29 | Stop reason: HOSPADM

## 2025-08-29 RX ORDER — NAPROXEN 500 MG/1
500 TABLET ORAL 2 TIMES DAILY WITH MEALS
Qty: 30 TABLET | Refills: 0 | Status: SHIPPED | OUTPATIENT
Start: 2025-08-29 | End: 2025-09-13

## 2025-08-29 RX ORDER — AMOXICILLIN 500 MG/1
1000 CAPSULE ORAL 2 TIMES DAILY
Qty: 56 CAPSULE | Refills: 0 | Status: SHIPPED | OUTPATIENT
Start: 2025-08-29 | End: 2025-09-18

## 2025-08-29 RX ORDER — ACETAMINOPHEN 325 MG/1
650 TABLET ORAL ONCE
Status: COMPLETED | OUTPATIENT
Start: 2025-08-29 | End: 2025-08-29

## 2025-08-29 RX ORDER — AMOXICILLIN 500 MG/1
1000 CAPSULE ORAL ONCE
Status: COMPLETED | OUTPATIENT
Start: 2025-08-29 | End: 2025-08-29

## 2025-08-29 RX ADMIN — AMOXICILLIN 1000 MG: 500 CAPSULE ORAL at 01:49

## 2025-08-29 RX ADMIN — ACETAMINOPHEN 650 MG: 325 TABLET ORAL at 01:49

## 2025-08-29 ASSESSMENT — PAIN DESCRIPTION - LOCATION
LOCATION: HEAD
LOCATION: THROAT

## 2025-08-29 ASSESSMENT — PAIN DESCRIPTION - DESCRIPTORS: DESCRIPTORS: ACHING

## 2025-08-29 ASSESSMENT — ENCOUNTER SYMPTOMS: SORE THROAT: 1

## 2025-08-29 ASSESSMENT — PAIN SCALES - GENERAL
PAINLEVEL_OUTOF10: 0
PAINLEVEL_OUTOF10: 1

## 2025-08-29 ASSESSMENT — PAIN - FUNCTIONAL ASSESSMENT: PAIN_FUNCTIONAL_ASSESSMENT: 0-10

## 2025-08-30 LAB — BACTERIA BLD CULT: NORMAL

## 2025-09-03 LAB — BACTERIA BLD CULT: NORMAL
